# Patient Record
Sex: MALE | Race: WHITE | NOT HISPANIC OR LATINO | Employment: OTHER | URBAN - NONMETROPOLITAN AREA
[De-identification: names, ages, dates, MRNs, and addresses within clinical notes are randomized per-mention and may not be internally consistent; named-entity substitution may affect disease eponyms.]

---

## 2018-07-24 ENCOUNTER — HOSPITAL ENCOUNTER (EMERGENCY)
Facility: HOSPITAL | Age: 58
Discharge: HOME | End: 2018-07-24
Attending: PHYSICIAN ASSISTANT | Admitting: PHYSICIAN ASSISTANT
Payer: COMMERCIAL

## 2018-07-24 VITALS
DIASTOLIC BLOOD PRESSURE: 80 MMHG | OXYGEN SATURATION: 96 % | RESPIRATION RATE: 20 BRPM | SYSTOLIC BLOOD PRESSURE: 125 MMHG | HEIGHT: 70 IN | HEART RATE: 87 BPM | BODY MASS INDEX: 33.64 KG/M2 | TEMPERATURE: 98.6 F | WEIGHT: 235 LBS

## 2018-07-24 DIAGNOSIS — L01.00 IMPETIGO: ICD-10-CM

## 2018-07-24 DIAGNOSIS — L03.211 FACIAL CELLULITIS: ICD-10-CM

## 2018-07-24 DIAGNOSIS — B02.9 HERPES ZOSTER WITHOUT COMPLICATION: ICD-10-CM

## 2018-07-24 PROCEDURE — 99213 OFFICE O/P EST LOW 20 MIN: CPT | Performed by: PHYSICIAN ASSISTANT

## 2018-07-24 PROCEDURE — G0463 HOSPITAL OUTPT CLINIC VISIT: HCPCS

## 2018-07-24 RX ORDER — VALACYCLOVIR HYDROCHLORIDE 1 G/1
1000 TABLET, FILM COATED ORAL 3 TIMES DAILY
Qty: 30 TABLET | Refills: 1 | Status: SHIPPED | OUTPATIENT
Start: 2018-07-24 | End: 2019-11-07

## 2018-07-24 RX ORDER — SULFAMETHOXAZOLE/TRIMETHOPRIM 800-160 MG
1 TABLET ORAL 2 TIMES DAILY
Qty: 20 TABLET | Refills: 0 | Status: SHIPPED | OUTPATIENT
Start: 2018-07-24 | End: 2018-08-03

## 2018-07-24 RX ORDER — MUPIROCIN 20 MG/G
OINTMENT TOPICAL 2 TIMES DAILY
Qty: 15 G | Refills: 0 | Status: SHIPPED | OUTPATIENT
Start: 2018-07-24 | End: 2019-11-21

## 2018-07-24 ASSESSMENT — ENCOUNTER SYMPTOMS
FACIAL SWELLING: 1
CONSTITUTIONAL NEGATIVE: 1
NEUROLOGICAL NEGATIVE: 1
CARDIOVASCULAR NEGATIVE: 1
PSYCHIATRIC NEGATIVE: 1

## 2018-07-24 NOTE — ED AVS SNAPSHOT
[facility]    [address]    [place] [address]    Phone:  [phone]                                       Junior Castellanos   MRN: [id number]    Department:  [facility]   Date of Visit:  7/24/2018           Patient Information     Date Of Birth          1960        Your diagnoses for this visit were:     Herpes zoster without complication     Facial cellulitis     Impetigo        You were seen by Alona Meredith PA.      Follow-up Information     Follow up with Ludmila Hahn MD.    Specialty:  Family Practice    Why:  If symptoms worsen    Contact information:    [address]  [place] [address]  [phone]          Follow up with [facility].    Specialty:  EMERGENCY MEDICINE    Why:  If further concerns develop    Contact information:    [address] [place] [address] [phone]    Additional information:    From [place] Area: Take [address]. Turn left at [address]. Turn left at the first stoplight on [address] [place] [address]. At the first stop sign, take a right onto [address]. Take a left into the parking lot and continue through until you reach the North enterance of the building.       From [place]: Take [address]. Take the [address] ramp towards [place]. Turn left onto [address]. Take a slight right onto [address]. Turn left at the first stoplight on [address]. At the first stop sign, take a right onto [address]. Take a left into the parking lot and continue through until you reach the North enterance of the building.       From [place]: Take [address]. Take a right at [address]. At the first stop sign, take a right onto [address]. Take a left into the parking lot and continue through until you reach the North enterance of the building.       Discharge References/Attachments     SHINGLES (HERPES ZOSTER) (ENGLISH)    SKIN INFECTION,  CELLULITIS (ENGLISH)    IMPETIGO, UNDERSTANDING (ENGLISH)         Review of your medicines      START taking        Dose / Directions Last dose taken    mupirocin 2 % ointment   Commonly known as:  BACTROBAN   Quantity:  15 g        Apply topically 2 times daily   Refills:  0        sulfamethoxazole-trimethoprim 800-160 MG per tablet   Commonly known as:  BACTRIM DS/SEPTRA DS   Dose:  1 tablet   Quantity:  20 tablet        Take 1 tablet by mouth 2 times daily for 10 days   Refills:  0        valACYclovir 1000 mg tablet   Commonly known as:  VALTREX   Dose:  1000 mg   Quantity:  30 tablet        Take 1 tablet (1,000 mg) by mouth 3 times daily for 10 days   Refills:  1                Prescriptions were sent or printed at these locations (3 Prescriptions)                   [facility] - [address]   [address] [address]    Telephone:  [phone]   Fax:  [phone]   Hours:                  E-Prescribed (3 of 3)         mupirocin (BACTROBAN) 2 % ointment               sulfamethoxazole-trimethoprim (BACTRIM DS/SEPTRA DS) 800-160 MG per tablet               valACYclovir (VALTREX) 1000 mg tablet                Orders Needing Specimen Collection     None      Pending Results     No orders found from 7/22/2018 to 7/25/2018.            Pending Culture Results     No orders found from 7/22/2018 to 7/25/2018.            Thank you for choosing [facility]       Thank you for choosing [facility] for your care. Our goal is always to provide you with excellent care. Hearing back from our patients is one way we can continue to improve our services. Please take a few minutes to complete the written survey that you may receive in the mail after you visit with us. Thank you!        [employer] Information     "[employer] gives you secure access to your electronic health record. If you see a primary care provider, you can also send messages to your care team and make appointments. If you have  questions, please call your primary care clinic.  If you do not have a primary care provider, please call [phone] and they will assist you.        Care EveryWhere ID     This is your Care EveryWhere ID. This could be used by other organizations to access your [place] medical records  [id number]        Equal Access to Services     MALACHI GUERAR : Johnson Carlos, wascar garcia, mega diasalefrem braun, elton mccallum. So [facility] [phone].    ATENCIÓN: Si habla español, tiene a bustamante disposición servicios gratuitos de asistencia lingüística. Llame al [phone].    We comply with applicable federal civil rights laws and [place] laws. We do not discriminate on the basis of race, color, national origin, age, disability, sex, sexual orientation, or gender identity.            After Visit Summary       This is your record. Keep this with you and show to your community pharmacist(s) and doctor(s) at your next visit.

## 2018-07-24 NOTE — ED NOTES
"Pt in for complaints of left eyebrow pain and swelling. Effected location is in an area where he reports he \"popped a few pimples on\" on Friday. Reports swelling since that time. Mild pain complaints to area. Thinks he may have aggravated the swelling as it is in the area he wears his C-PAP at night.  "

## 2018-07-24 NOTE — ED AVS SNAPSHOT
[facility]    [address] [address]    Phone:  [phone]                                       Junior Castellanos   MRN: [id number]    Department:  [facility]   Date of Visit:  7/24/2018           After Visit Summary Signature Page     I have received my discharge instructions, and my questions have been answered. I have discussed any challenges I see with this plan with the nurse or doctor.    ..........................................................................................................................................  Patient/Patient Representative Signature      ..........................................................................................................................................  Patient Representative Print Name and Relationship to Patient    ..................................................               ................................................  Date                                            Time    ..........................................................................................................................................  Reviewed by Signature/Title    ...................................................              ..............................................  Date                                                            Time

## 2018-07-25 NOTE — ED PROVIDER NOTES
"  History     Chief Complaint   Patient presents with     Wound Infection     laura left eyebrow. ongoing since Friday evening.     The history is provided by the patient. No  was used.     Junior Castellanos is a 57 year old male who for 3 days has been developing a rash/wound over left eyebrow. No n/v/d/f/c. He does not recall any direct injury or insect bite.    Problem List:    Patient Active Problem List    Diagnosis Date Noted     JAGUAR on CPAP      Priority: Medium        Past Medical History:    Past Medical History:   Diagnosis Date     JAGUAR on CPAP        Past Surgical History:    Past Surgical History:   Procedure Laterality Date     COLONOSCOPY N/A 5/20/2016    Procedure: COLONOSCOPY;  Surgeon: Mayo Felix DO;  Location: [facility]     UPPER GI ENDOSCOPY         Family History:    No family history on file.    Social History:  Marital Status:   [2]  Social History   Substance Use Topics     Smoking status: Former Smoker     Types: Cigarettes     Smokeless tobacco: Never Used     Alcohol use No        Medications:      mupirocin (BACTROBAN) 2 % ointment   sulfamethoxazole-trimethoprim (BACTRIM DS/SEPTRA DS) 800-160 MG per tablet   valACYclovir (VALTREX) 1000 mg tablet         Review of Systems   Constitutional: Negative.    HENT: Positive for facial swelling.    Cardiovascular: Negative.    Skin: Positive for rash.   Neurological: Negative.    Psychiatric/Behavioral: Negative.        Physical Exam   BP: 125/80  Pulse: 87  Temp: 98.6  F (37  C)  Resp: 20  Height: 177.8 cm (5' 10\")  Weight: 106.6 kg (235 lb)  SpO2: 96 %      Physical Exam   Constitutional: He is oriented to person, place, and time. He appears well-developed and well-nourished. No distress.   HENT:   Left side of pt's scalp and down to his left eyebrow pt has moderate amount erythematous  Based vesicles.    There is also an area of of honeycombed crusting over left lateral eyebrow area.    Left upper and lower eyelids " and left eyebrow have erythema/edema.     Left eye examined under magnification, with fluorescein stain and wood's lamp.  No ulcer/FB/ferning/abrasion noted    Cardiovascular: Normal rate.    Pulmonary/Chest: Effort normal.   Neurological: He is alert and oriented to person, place, and time.   Skin: He is not diaphoretic.   Psychiatric: He has a normal mood and affect.   Nursing note and vitals reviewed.      ED Course     ED Course     Procedures            Assessments & Plan (with Medical Decision Making)     I have reviewed the nursing notes.    I have reviewed the findings, diagnosis, plan and need for follow up with the patient.      Discharge Medication List as of 7/24/2018  9:56 AM      START taking these medications    Details   mupirocin (BACTROBAN) 2 % ointment Apply topically 2 times dailyDisp-15 g, [id number]      sulfamethoxazole-trimethoprim (BACTRIM DS/SEPTRA DS) 800-160 MG per tablet Take 1 tablet by mouth 2 times daily for 10 days, Disp-20 tablet, R-0, E-Prescribe      valACYclovir (VALTREX) 1000 mg tablet Take 1 tablet (1,000 mg) by mouth 3 times daily for 10 days, Disp-30 tablet, R-1, E-Prescribe             Final diagnoses:   Herpes zoster without complication   Facial cellulitis   Impetigo         Keep area clean/dry  Patient verbally educated and given appropriate education sheets for the diagnoses and has no questions.  Take medications as directed.   Follow up with your Primary Care provider if symptoms increase or if further concerns develop, return to the ER  Alona Meredith Certified  Physician Assistant  7/24/2018  8:41 PM  URGENT CARE CLINIC      7/24/2018   [facility]     Alona Meredith PA  07/24/18 2042

## 2019-11-07 ENCOUNTER — APPOINTMENT (OUTPATIENT)
Dept: GENERAL RADIOLOGY | Facility: HOSPITAL | Age: 59
End: 2019-11-07
Attending: FAMILY MEDICINE
Payer: COMMERCIAL

## 2019-11-07 ENCOUNTER — HOSPITAL ENCOUNTER (EMERGENCY)
Facility: HOSPITAL | Age: 59
Discharge: HOME | End: 2019-11-07
Attending: FAMILY MEDICINE | Admitting: FAMILY MEDICINE
Payer: COMMERCIAL

## 2019-11-07 VITALS
RESPIRATION RATE: 16 BRPM | HEART RATE: 102 BPM | DIASTOLIC BLOOD PRESSURE: 81 MMHG | WEIGHT: 195 LBS | OXYGEN SATURATION: 99 % | HEIGHT: 69 IN | SYSTOLIC BLOOD PRESSURE: 118 MMHG | TEMPERATURE: 98.5 F | BODY MASS INDEX: 28.88 KG/M2

## 2019-11-07 DIAGNOSIS — R10.13 EPIGASTRIC PAIN: ICD-10-CM

## 2019-11-07 DIAGNOSIS — E11.69 TYPE 2 DIABETES MELLITUS WITH OTHER SPECIFIED COMPLICATION, WITHOUT LONG-TERM CURRENT USE OF INSULIN (H): ICD-10-CM

## 2019-11-07 DIAGNOSIS — R07.89 NON-CARDIAC CHEST PAIN: ICD-10-CM

## 2019-11-07 LAB
ANION GAP SERPL CALCULATED.3IONS-SCNC: 9 MMOL/L (ref 3–14)
BASOPHILS # BLD AUTO: 0 10E9/L (ref 0–0.2)
BASOPHILS NFR BLD AUTO: 0.5 %
BUN SERPL-MCNC: 12 MG/DL (ref 7–30)
CALCIUM SERPL-MCNC: 8.8 MG/DL (ref 8.5–10.1)
CHLORIDE SERPL-SCNC: 104 MMOL/L (ref 94–109)
CO2 SERPL-SCNC: 22 MMOL/L (ref 20–32)
CREAT SERPL-MCNC: 0.7 MG/DL (ref 0.66–1.25)
DIFFERENTIAL METHOD BLD: NORMAL
EOSINOPHIL # BLD AUTO: 0.1 10E9/L (ref 0–0.7)
EOSINOPHIL NFR BLD AUTO: 0.7 %
ERYTHROCYTE [DISTWIDTH] IN BLOOD BY AUTOMATED COUNT: 12.8 % (ref 10–15)
EST. AVERAGE GLUCOSE BLD GHB EST-MCNC: 335 MG/DL
GFR SERPL CREATININE-BSD FRML MDRD: >90 ML/MIN/{1.73_M2}
GLUCOSE BLDC GLUCOMTR-MCNC: 271 MG/DL (ref 70–99)
GLUCOSE BLDC GLUCOMTR-MCNC: 305 MG/DL (ref 70–99)
GLUCOSE SERPL-MCNC: 366 MG/DL (ref 70–99)
HBA1C MFR BLD: 13.3 % (ref 0–5.6)
HCT VFR BLD AUTO: 45 % (ref 40–53)
HGB BLD-MCNC: 15.3 G/DL (ref 13.3–17.7)
IMM GRANULOCYTES # BLD: 0 10E9/L (ref 0–0.4)
IMM GRANULOCYTES NFR BLD: 0.2 %
LYMPHOCYTES # BLD AUTO: 1.9 10E9/L (ref 0.8–5.3)
LYMPHOCYTES NFR BLD AUTO: 23.2 %
MCH RBC QN AUTO: 28.8 PG (ref 26.5–33)
MCHC RBC AUTO-ENTMCNC: 34 G/DL (ref 31.5–36.5)
MCV RBC AUTO: 85 FL (ref 78–100)
MONOCYTES # BLD AUTO: 0.7 10E9/L (ref 0–1.3)
MONOCYTES NFR BLD AUTO: 8.1 %
NEUTROPHILS # BLD AUTO: 5.5 10E9/L (ref 1.6–8.3)
NEUTROPHILS NFR BLD AUTO: 67.3 %
NRBC # BLD AUTO: 0 10*3/UL
NRBC BLD AUTO-RTO: 0 /100
PLATELET # BLD AUTO: 288 10E9/L (ref 150–450)
POTASSIUM SERPL-SCNC: 4 MMOL/L (ref 3.4–5.3)
RBC # BLD AUTO: 5.31 10E12/L (ref 4.4–5.9)
SODIUM SERPL-SCNC: 135 MMOL/L (ref 133–144)
TROPONIN I SERPL-MCNC: <0.015 UG/L (ref 0–0.04)
TROPONIN I SERPL-MCNC: <0.015 UG/L (ref 0–0.04)
WBC # BLD AUTO: 8.2 10E9/L (ref 4–11)

## 2019-11-07 PROCEDURE — 99285 EMERGENCY DEPT VISIT HI MDM: CPT | Mod: 25

## 2019-11-07 PROCEDURE — 93005 ELECTROCARDIOGRAM TRACING: CPT

## 2019-11-07 PROCEDURE — 71046 X-RAY EXAM CHEST 2 VIEWS: CPT | Mod: TC

## 2019-11-07 PROCEDURE — 80048 BASIC METABOLIC PNL TOTAL CA: CPT | Performed by: FAMILY MEDICINE

## 2019-11-07 PROCEDURE — 99285 EMERGENCY DEPT VISIT HI MDM: CPT | Mod: Z6 | Performed by: FAMILY MEDICINE

## 2019-11-07 PROCEDURE — 83036 HEMOGLOBIN GLYCOSYLATED A1C: CPT | Performed by: FAMILY MEDICINE

## 2019-11-07 PROCEDURE — 25000131 ZZH RX MED GY IP 250 OP 636 PS 637: Performed by: FAMILY MEDICINE

## 2019-11-07 PROCEDURE — C9113 INJ PANTOPRAZOLE SODIUM, VIA: HCPCS | Performed by: FAMILY MEDICINE

## 2019-11-07 PROCEDURE — 25000128 H RX IP 250 OP 636: Performed by: FAMILY MEDICINE

## 2019-11-07 PROCEDURE — 84484 ASSAY OF TROPONIN QUANT: CPT | Mod: 91 | Performed by: FAMILY MEDICINE

## 2019-11-07 PROCEDURE — 00000146 ZZHCL STATISTIC GLUCOSE BY METER IP

## 2019-11-07 PROCEDURE — 25000125 ZZHC RX 250: Performed by: FAMILY MEDICINE

## 2019-11-07 PROCEDURE — 84484 ASSAY OF TROPONIN QUANT: CPT | Performed by: FAMILY MEDICINE

## 2019-11-07 PROCEDURE — 40000788 ZZHCL STATISTIC ESTIMATED AVERAGE GLUCOSE: Performed by: FAMILY MEDICINE

## 2019-11-07 PROCEDURE — 85025 COMPLETE CBC W/AUTO DIFF WBC: CPT | Performed by: FAMILY MEDICINE

## 2019-11-07 PROCEDURE — 96374 THER/PROPH/DIAG INJ IV PUSH: CPT

## 2019-11-07 PROCEDURE — 93010 ELECTROCARDIOGRAM REPORT: CPT | Performed by: INTERNAL MEDICINE

## 2019-11-07 PROCEDURE — 36415 COLL VENOUS BLD VENIPUNCTURE: CPT | Performed by: FAMILY MEDICINE

## 2019-11-07 PROCEDURE — 25000132 ZZH RX MED GY IP 250 OP 250 PS 637: Performed by: FAMILY MEDICINE

## 2019-11-07 RX ORDER — DEXTROSE MONOHYDRATE 25 G/50ML
25-50 INJECTION, SOLUTION INTRAVENOUS
Status: DISCONTINUED | OUTPATIENT
Start: 2019-11-07 | End: 2019-11-08 | Stop reason: HOSPADM

## 2019-11-07 RX ORDER — PHENOBARBITAL, HYOSCYAMINE SULFATE, ATROPINE SULFATE, SCOPOLAMINE HYDROBROMIDE .0194; .1037; 16.2; .0065 MG/5ML; MG/5ML; MG/5ML; MG/5ML
2 ELIXIR ORAL EVERY 6 HOURS
Status: DISCONTINUED | OUTPATIENT
Start: 2019-11-07 | End: 2019-11-08 | Stop reason: HOSPADM

## 2019-11-07 RX ORDER — NICOTINE POLACRILEX 4 MG
15-30 LOZENGE BUCCAL
Status: DISCONTINUED | OUTPATIENT
Start: 2019-11-07 | End: 2019-11-08 | Stop reason: HOSPADM

## 2019-11-07 RX ADMIN — PANTOPRAZOLE SODIUM 40 MG: 40 INJECTION, POWDER, LYOPHILIZED, FOR SOLUTION INTRAVENOUS at 20:44

## 2019-11-07 RX ADMIN — LIDOCAINE HYDROCHLORIDE 30 ML: 20 SOLUTION ORAL; TOPICAL at 18:27

## 2019-11-07 RX ADMIN — INSULIN HUMAN 10 UNITS: 100 INJECTION, SOLUTION PARENTERAL at 21:39

## 2019-11-07 RX ADMIN — PHENOBARBITAL, HYOSCYAMINE SULFATE, ATROPINE SULFATE, SCOPOLAMINE HYDROBROMIDE: 16.2; .1037; .0194; .0065 ELIXIR ORAL at 18:27

## 2019-11-07 ASSESSMENT — ENCOUNTER SYMPTOMS
SHORTNESS OF BREATH: 0
HEADACHES: 1
ACTIVITY CHANGE: 1
NAUSEA: 0
POLYPHAGIA: 0
CONSTIPATION: 0
PALPITATIONS: 0
MUSCULOSKELETAL NEGATIVE: 1
WEAKNESS: 0
ABDOMINAL PAIN: 1
PSYCHIATRIC NEGATIVE: 1
DIARRHEA: 0
ROS GI COMMENTS: EPIGASTRIC
FATIGUE: 0
VOMITING: 0
DYSURIA: 0
POLYDIPSIA: 1

## 2019-11-07 ASSESSMENT — MIFFLIN-ST. JEOR: SCORE: 1684.89

## 2019-11-07 NOTE — ED TRIAGE NOTES
"Pt in for complaints of chest pressure intermittent for \"awhile\" but more constant today prompting visit. Pt reports some Upper abdominal pain as well recently with some nausea. Pt reports a HA as well. Sx onset at 2pm today. Pt does not normally get HA's per report.   " sob, acute pulmonary edema

## 2019-11-07 NOTE — ED AVS SNAPSHOT
[facility]  [address] [address]  Phone:  [phone]                                    Junior Castellanos   MRN: [id number]    Department:  [facility]   Date of Visit:  11/7/2019           After Visit Summary Signature Page    I have received my discharge instructions, and my questions have been answered. I have discussed any challenges I see with this plan with the nurse or doctor.    ..........................................................................................................................................  Patient/Patient Representative Signature      ..........................................................................................................................................  Patient Representative Print Name and Relationship to Patient    ..................................................               ................................................  Date                                   Time    ..........................................................................................................................................  Reviewed by Signature/Title    ...................................................              ..............................................  Date                                               Time          22EPIC Rev 08/18

## 2019-11-08 NOTE — ED PROVIDER NOTES
History     Chief Complaint   Patient presents with     Chest Pain     Headache     HPI  Junior Castellanos is a 58 year old male who presents to the emergency room with epigastric pain that radiates up into his chest.  He also has a headache.  Been having this pain for several days, however the chest pressure component was worse today and he is worried about cardiac.  The upper abdominal pain and nausea have been going on for some time, he is not currently nauseated.  He rates his chest pain at 2/10.  He also had a headache that started at 2 PM today, he does not normally get headaches.    Allergies:  No Known Allergies    Problem List:    Patient Active Problem List    Diagnosis Date Noted     JAGUAR on CPAP      Priority: Medium        Past Medical History:    Past Medical History:   Diagnosis Date     JAGUAR on CPAP        Past Surgical History:    Past Surgical History:   Procedure Laterality Date     COLONOSCOPY N/A 5/20/2016    Procedure: COLONOSCOPY;  Surgeon: Mayo Felix DO;  Location: [facility]     UPPER GI ENDOSCOPY         Family History:    No family history on file.    Social History:  Marital Status:   [2]  Social History     Tobacco Use     Smoking status: Former Smoker     Types: Cigarettes     Smokeless tobacco: Never Used   Substance Use Topics     Alcohol use: No     Alcohol/week: 0.0 standard drinks     Drug use: No        Medications:    metFORMIN (GLUCOPHAGE) 500 MG tablet  omeprazole (PRILOSEC) 20 MG DR capsule  mupirocin (BACTROBAN) 2 % ointment          Review of Systems   Constitutional: Positive for activity change. Negative for fatigue.   HENT: Negative.    Respiratory: Negative for shortness of breath.    Cardiovascular: Positive for chest pain. Negative for palpitations and leg swelling.   Gastrointestinal: Positive for abdominal pain. Negative for constipation, diarrhea, nausea and vomiting.        Epigastric   Endocrine: Positive for polydipsia and polyuria. Negative for  "polyphagia.   Genitourinary: Negative for dysuria.   Musculoskeletal: Negative.    Skin: Negative.    Neurological: Positive for headaches. Negative for weakness.   Psychiatric/Behavioral: Negative.        Physical Exam   BP: 126/80  Pulse: 110  Heart Rate: 100  Temp: 98.3  F (36.8  C)  Resp: 20  Height: 175.3 cm (5' 9\")  Weight: 88.5 kg (195 lb)  SpO2: 98 %      Physical Exam  Vitals signs and nursing note reviewed.   Constitutional:       General: He is not in acute distress.     Appearance: He is well-developed and normal weight.   HENT:      Head: Normocephalic and atraumatic.   Eyes:      Extraocular Movements: Extraocular movements intact.      Pupils: Pupils are equal, round, and reactive to light.   Neck:      Musculoskeletal: Normal range of motion and neck supple.   Cardiovascular:      Rate and Rhythm: Normal rate and regular rhythm.      Heart sounds: Normal heart sounds. No murmur.   Pulmonary:      Effort: Pulmonary effort is normal. No tachypnea.      Breath sounds: Normal breath sounds.   Abdominal:      General: Bowel sounds are normal.      Palpations: Abdomen is soft.      Tenderness: There is tenderness in the epigastric area.          Comments: Radiation of pain up in the esophagus at midline   Musculoskeletal: Normal range of motion.   Skin:     General: Skin is warm and dry.      Capillary Refill: Capillary refill takes less than 2 seconds.   Neurological:      General: No focal deficit present.      Mental Status: He is alert and oriented to person, place, and time.      Motor: No weakness.   Psychiatric:         Mood and Affect: Mood normal.         Behavior: Behavior normal.         ED Course        Procedures          EKG Interpretation:      Interpreted by Nati Meek MD  Time reviewed: 1720  Symptoms at time of EKG: chest pain / burning   Rhythm: normal sinus   Rate: normal  Axis: normal  Ectopy: none  Conduction: normal  ST Segments/ T Waves: No ST-T wave changes  Q " Waves: none  Comparison to prior: No old EKG available    Clinical Impression: normal EKG    Results for orders placed or performed during the hospital encounter of 11/07/19 (from the past 24 hour(s))   CBC with platelets differential   Result Value Ref Range    WBC 8.2 4.0 - 11.0 10e9/L    RBC Count 5.31 4.4 - 5.9 10e12/L    Hemoglobin 15.3 13.3 - 17.7 g/dL    Hematocrit 45.0 40.0 - 53.0 %    MCV 85 78 - 100 fl    MCH 28.8 26.5 - 33.0 pg    MCHC 34.0 31.5 - 36.5 g/dL    RDW 12.8 10.0 - 15.0 %    Platelet Count 288 150 - 450 10e9/L    Diff Method Automated Method     % Neutrophils 67.3 %    % Lymphocytes 23.2 %    % Monocytes 8.1 %    % Eosinophils 0.7 %    % Basophils 0.5 %    % Immature Granulocytes 0.2 %    Nucleated RBCs 0 0 /100    Absolute Neutrophil 5.5 1.6 - 8.3 10e9/L    Absolute Lymphocytes 1.9 0.8 - 5.3 10e9/L    Absolute Monocytes 0.7 0.0 - 1.3 10e9/L    Absolute Eosinophils 0.1 0.0 - 0.7 10e9/L    Absolute Basophils 0.0 0.0 - 0.2 10e9/L    Abs Immature Granulocytes 0.0 0 - 0.4 10e9/L    Absolute Nucleated RBC 0.0    Basic metabolic panel   Result Value Ref Range    Sodium 135 133 - 144 mmol/L    Potassium 4.0 3.4 - 5.3 mmol/L    Chloride 104 94 - 109 mmol/L    Carbon Dioxide 22 20 - 32 mmol/L    Anion Gap 9 3 - 14 mmol/L    Glucose 366 (H) 70 - 99 mg/dL    Urea Nitrogen 12 7 - 30 mg/dL    Creatinine 0.70 0.66 - 1.25 mg/dL    GFR Estimate >90 >60 mL/min/[1.73_m2]    GFR Estimate If Black >90 >60 mL/min/[1.73_m2]    Calcium 8.8 8.5 - 10.1 mg/dL   Troponin I   Result Value Ref Range    Troponin I ES <0.015 0.000 - 0.045 ug/L   Hemoglobin A1c   Result Value Ref Range    Hemoglobin A1C 13.3 (H) 0 - 5.6 %   Estimated Average Glucose   Result Value Ref Range    Estimated Average Glucose 335 mg/dL   XR Chest 2 Views    Narrative    XR CHEST 2 VW    HISTORY: 58 years Male chest pain    COMPARISON: None    TECHNIQUE: 2 views of the chest were obtained.    FINDINGS: Two views of the chest were obtained. Heart size  and  pulmonary vascularity are within normal limits, lungs are clear on  both views. No consolidating air space opacities are present.          Impression    IMPRESSION: Clear chest.    RADHA HENRIQUEZ MD   Troponin I   Result Value Ref Range    Troponin I ES <0.015 0.000 - 0.045 ug/L       Medications   atropine-PHENobarbital-scopolamine-hyoscyamine (DONNATAL) 16.2 MG/5ML solution 6.48 mg ( Oral Given 11/7/19 1827)   glucose gel 15-30 g (has no administration in time range)     Or   dextrose 50 % injection 25-50 mL (has no administration in time range)     Or   glucagon injection 1 mg (has no administration in time range)   insulin regular (HumuLIN R/NovoLIN R VIAL) injection 10 Units (has no administration in time range)   lidocaine (XYLOCAINE) 2 % 15 mL, alum & mag hydroxide-simethicone (MYLANTA ES/MAALOX  ES) 15 mL GI Cocktail (30 mLs Oral Given 11/7/19 1827)   pantoprazole (PROTONIX) 40 mg IV push injection (40 mg Intravenous Given 11/7/19 2044)       Assessments & Plan (with Medical Decision Making)   Patient's chest pain appears to be gastric rather than cardiac.  He has burning type pain, it did improve some with GI cocktail.  We will start him on omeprazole 40 mg twice daily for 14 days, then decrease to daily.  Cardiac enzymes negative x2, 3 hours apart.  Patient is not a known diabetic, however he is clearly been diabetic for some time.  His hemoglobin A1c is 13.3 and his blood glucose while here was 336.  He received 10 units of insulin subcutaneously in the ER, will be started on metformin 500 mg twice daily and referred to diabetes education and primary care for management of his diabetes.  Note sent to Dr. Garg as Dr. Ureña does not have a mailbox as yet.    I have reviewed the nursing notes.    I have reviewed the findings, diagnosis, plan and need for follow up with the patient.  New Prescriptions    METFORMIN (GLUCOPHAGE) 500 MG TABLET    Take 1 tablet (500 mg) by mouth 2 times daily (with  meals)    OMEPRAZOLE (PRILOSEC) 20 MG DR CAPSULE    Take 2 capsules (40 mg) by mouth 2 times daily Take twice daily for 2 weeks, then daily       Final diagnoses:   Epigastric pain   Non-cardiac chest pain   Type 2 diabetes mellitus with other specified complication, without long-term current use of insulin (H)       11/7/2019   [facility]     Nati Valero MD  11/07/19 3712

## 2019-11-11 ENCOUNTER — TRANSFERRED RECORDS (OUTPATIENT)
Dept: HEALTH INFORMATION MANAGEMENT | Facility: CLINIC | Age: 59
End: 2019-11-11

## 2019-11-11 LAB — RETINOPATHY: NORMAL

## 2019-11-18 ENCOUNTER — TRANSFERRED RECORDS (OUTPATIENT)
Dept: HEALTH INFORMATION MANAGEMENT | Facility: HOSPITAL | Age: 59
End: 2019-11-18

## 2019-11-18 LAB
CHOLEST SERPL-MCNC: 144 MG/DL (ref 114–200)
HDLC SERPL-MCNC: 29 MG/DL (ref 40–60)
LDLC SERPL CALC-MCNC: 70 MG/DL
TRIGL SERPL-MCNC: 224 MG/DL (ref 10–200)

## 2019-11-20 DIAGNOSIS — E11.9 NEW ONSET TYPE 2 DIABETES MELLITUS (H): ICD-10-CM

## 2019-11-20 RX ORDER — ASPIRIN 81 MG/1
81 TABLET ORAL DAILY
COMMUNITY

## 2019-11-20 RX ORDER — ATORVASTATIN CALCIUM 10 MG/1
10 TABLET, FILM COATED ORAL EVERY EVENING
Refills: 10 | COMMUNITY
Start: 2019-11-18 | End: 2022-11-16

## 2019-11-21 ENCOUNTER — HOSPITAL ENCOUNTER (OUTPATIENT)
Dept: EDUCATION SERVICES | Facility: HOSPITAL | Age: 59
Discharge: HOME | End: 2019-11-21
Attending: FAMILY MEDICINE | Admitting: FAMILY MEDICINE
Payer: COMMERCIAL

## 2019-11-21 VITALS
HEART RATE: 86 BPM | SYSTOLIC BLOOD PRESSURE: 96 MMHG | BODY MASS INDEX: 29.55 KG/M2 | HEIGHT: 68 IN | DIASTOLIC BLOOD PRESSURE: 57 MMHG | OXYGEN SATURATION: 98 % | WEIGHT: 195 LBS

## 2019-11-21 DIAGNOSIS — E11.9 NEW ONSET TYPE 2 DIABETES MELLITUS (H): ICD-10-CM

## 2019-11-21 PROCEDURE — G0108 DIAB MANAGE TRN  PER INDIV: HCPCS

## 2019-11-21 ASSESSMENT — ANXIETY QUESTIONNAIRES
GAD7 TOTAL SCORE: 0
6. BECOMING EASILY ANNOYED OR IRRITABLE: NOT AT ALL
1. FEELING NERVOUS, ANXIOUS, OR ON EDGE: NOT AT ALL
3. WORRYING TOO MUCH ABOUT DIFFERENT THINGS: NOT AT ALL
7. FEELING AFRAID AS IF SOMETHING AWFUL MIGHT HAPPEN: NOT AT ALL
5. BEING SO RESTLESS THAT IT IS HARD TO SIT STILL: NOT AT ALL
IF YOU CHECKED OFF ANY PROBLEMS ON THIS QUESTIONNAIRE, HOW DIFFICULT HAVE THESE PROBLEMS MADE IT FOR YOU TO DO YOUR WORK, TAKE CARE OF THINGS AT HOME, OR GET ALONG WITH OTHER PEOPLE: NOT DIFFICULT AT ALL
2. NOT BEING ABLE TO STOP OR CONTROL WORRYING: NOT AT ALL

## 2019-11-21 ASSESSMENT — PATIENT HEALTH QUESTIONNAIRE - PHQ9
5. POOR APPETITE OR OVEREATING: NOT AT ALL
SUM OF ALL RESPONSES TO PHQ QUESTIONS 1-9: 1

## 2019-11-21 ASSESSMENT — PAIN SCALES - GENERAL: PAINLEVEL: NO PAIN (0)

## 2019-11-21 ASSESSMENT — MIFFLIN-ST. JEOR: SCORE: 1665.04

## 2019-11-21 NOTE — LETTER
"    11/21/2019        RE: Junior Castellanos  [address] [place] [address]        Diabetes Self-Management Education & Support    Diabetes Education Self Management & Training    SUBJECTIVE/OBJECTIVE:  Presents for: Initial Assessment for new diagnosis  Accompanied by: Self, Spouse  Diabetes education in the past 24mo: No  Focus of Visit: Healthy Eating  Diabetes type: Type 2  Disease course: Improving  Diabetes management related comments/concerns: what do I need to do?  Transportation concerns: No  Other concerns:: Glasses  Cultural Influences/Ethnic Background:  American      Diabetes Symptoms & Complications  Blurred vision: Yes  Fatigue: Yes  Neuropathy: Yes  Foot ulcerations: No  Polydipsia: No  Polyphagia: No  Polyuria: Yes  Visual change: No  Weakness: No  Weight loss: No  Slow healing wounds: No  Recent Infection(s): No  Symptom course: Improving  Weight trend: Decreasing steadily       Patient Problem List and Family Medical History reviewed for relevant medical history, current medical status, and diabetes risk factors.    Vitals:  BP 96/57   Pulse 86   Ht 1.721 m (5' 7.75\")   Wt 88.5 kg (195 lb)   SpO2 98%   BMI 29.87 kg/m     Estimated body mass index is 29.87 kg/m  as calculated from the following:    Height as of this encounter: 1.721 m (5' 7.75\").    Weight as of this encounter: 88.5 kg (195 lb).   Last 3 BP:   BP Readings from Last 3 Encounters:   11/21/19 96/57   11/07/19 118/81   07/24/18 125/80       History   Smoking Status     Former Smoker     Types: Cigarettes   Smokeless Tobacco     Never Used       Labs:  Lab Results   Component Value Date    A1C 13.3 11/07/2019     Lab Results   Component Value Date     11/07/2019     Lab Results   Component Value Date    LDL 70 11/18/2019     HDL Cholesterol   Date Value Ref Range Status   11/18/2019 29 (L) 40 - 60 mg/dL Final   ]  GFR Estimate   Date Value Ref Range Status   11/07/2019 >90 >60 mL/min/[1.73_m2] Final     Comment:     Non "  GFR Calc  Starting 12/18/2018, serum creatinine based estimated GFR (eGFR) will be   calculated using the Chronic Kidney Disease Epidemiology Collaboration   (CKD-EPI) equation.       GFR Estimate If Black   Date Value Ref Range Status   11/07/2019 >90 >60 mL/min/[1.73_m2] Final     Comment:      GFR Calc  Starting 12/18/2018, serum creatinine based estimated GFR (eGFR) will be   calculated using the Chronic Kidney Disease Epidemiology Collaboration   (CKD-EPI) equation.       Lab Results   Component Value Date    CR 0.70 11/07/2019     No results found for: MICROALBUMIN    Healthy Eating  Healthy Eating Assessed Today: Yes  Meal planning: Smaller portions, Heart healthy, Avoiding sweets  Meals include: Breakfast, Dinner, Snacks  Breakfast: plain oatmeal with cinnamon  Lunch: snacks: rice cakes or nuts  Dinner: beef or chicken with salad, pasta  Snacks: nuts, fruit  Beverages: Water, Tea, Coffee, Other(Propel or Sobies)  Has patient met with a dietitian in the past?: No    Being Active  Being Active Assessed Today: Yes  Exercise:: Currently not exercising  Barrier to exercise: Access    Monitoring  Monitoring Assessed Today: No(Has not started monitoring)          Taking Medications  Diabetes Medication(s)     Biguanides       metFORMIN (GLUCOPHAGE) 500 MG tablet    Take 1 tablet (500 mg) by mouth 2 times daily (with meals)          Taking Medication Assessed Today: Yes  Current Treatments: Oral Agent (monotherapy)  Problems taking diabetes medications regularly?: No  Diabetes medication side effects?: No  Treatment Compliance: All of the time    Problem Solving  Problem Solving Assessed Today: Yes  Hypoglycemia Frequency: Never              Reducing Risks  Reducing Risks Assessed Today: No  Has dilated eye exam at least once a year?: Yes    Healthy Coping  Healthy Coping Assessed Today: Yes  Emotional response to diabetes: Ready to learn  Informal Support system:: Family  Stage of  change: PREPARATION (Decided to change - considering how)  Difficulty affording diabetes management supplies?: No  Support resources: Websites  Patient Activation Measure Survey Score:  No flowsheet data found.    ASSESSMENT:  Junior has a new diagnosis of diabetes. Denies family history. He is accompanied by his wife who is very supportive. They both ask many pertinent questions and are engaged in education.        Patient's most recent   Lab Results   Component Value Date    A1C 13.3 11/07/2019    is not meeting goal of <8.0    INTERVENTION:   Diabetes knowledge and skills assessment:     Patient is knowledgeable in diabetes management concepts related to: Being Active and Taking Medication    Patient needs further education on the following diabetes management concepts: Healthy Eating, Being Active and Monitoring    Based on learning assessment above, most appropriate setting for further diabetes education would be: Individual setting.    Education provided today on:  AADE Self-Care Behaviors:  Diabetes Pathophysiology  Healthy Eating: carbohydrate counting, consistency in amount, composition, and timing of food intake, weight reduction, portion control and label reading  Being Active: relationship to blood glucose, describe appropriate activity program and precautions to take  Monitoring: purpose, proper technique, log and interpret results, individual blood glucose targets, frequency of monitoring, use of glucose control solution and proper sharps disposal. Able to do a BG test with minimal prompts. BG result 277, 5 hrs ppd  Taking Medication: action of prescribed medication, side effects of prescribed medications and when to take medications    Opportunities for ongoing education and support in diabetes-self management were discussed.    Pt verbalized understanding of concepts discussed and recommendations provided today.       Education Materials Provided:  Type 2 Basics Book, food logs, My Food Plan, Contour  Next Meter Kit    PLAN:  See Patient Instructions for co-developed, patient-stated behavior change goals.  Test BG every AM before food or coffee and 2 hours after evening meal.    Keep food logs    Return to Diabetes Ed on 12/5 19 at 3:30 pm    AVS printed and provided to patient today. See Follow-Up section for recommended follow-up.      Time Spent: 80 minutes  Encounter Type: Individual    Any diabetes medication dose changes were made via the CDE Protocol and Collaborative Practice Agreement with the patient's primary care provider. A copy of this encounter was shared with the provider.            Sincerely,        Linda Lacey RN

## 2019-11-22 ASSESSMENT — ANXIETY QUESTIONNAIRES: GAD7 TOTAL SCORE: 0

## 2019-11-25 ENCOUNTER — TELEPHONE (OUTPATIENT)
Dept: EDUCATION SERVICES | Facility: OTHER | Age: 59
End: 2019-11-25

## 2019-11-25 NOTE — PATIENT INSTRUCTIONS
Test BG every AM before food or coffee and 2 hours after evening meal.    Keep food logs    Return to Diabetes Ed on 12/5 19 at 3:30 pm

## 2019-11-25 NOTE — PROGRESS NOTES
"Diabetes Self-Management Education & Support    Diabetes Education Self Management & Training    SUBJECTIVE/OBJECTIVE:  Presents for: Initial Assessment for new diagnosis  Accompanied by: Self, Spouse  Diabetes education in the past 24mo: No  Focus of Visit: Healthy Eating  Diabetes type: Type 2  Disease course: Improving  Diabetes management related comments/concerns: what do I need to do?  Transportation concerns: No  Other concerns:: Glasses  Cultural Influences/Ethnic Background:  American      Diabetes Symptoms & Complications  Blurred vision: Yes  Fatigue: Yes  Neuropathy: Yes  Foot ulcerations: No  Polydipsia: No  Polyphagia: No  Polyuria: Yes  Visual change: No  Weakness: No  Weight loss: No  Slow healing wounds: No  Recent Infection(s): No  Symptom course: Improving  Weight trend: Decreasing steadily       Patient Problem List and Family Medical History reviewed for relevant medical history, current medical status, and diabetes risk factors.    Vitals:  BP 96/57   Pulse 86   Ht 1.721 m (5' 7.75\")   Wt 88.5 kg (195 lb)   SpO2 98%   BMI 29.87 kg/m    Estimated body mass index is 29.87 kg/m  as calculated from the following:    Height as of this encounter: 1.721 m (5' 7.75\").    Weight as of this encounter: 88.5 kg (195 lb).   Last 3 BP:   BP Readings from Last 3 Encounters:   11/21/19 96/57   11/07/19 118/81   07/24/18 125/80       History   Smoking Status     Former Smoker     Types: Cigarettes   Smokeless Tobacco     Never Used       Labs:  Lab Results   Component Value Date    A1C 13.3 11/07/2019     Lab Results   Component Value Date     11/07/2019     Lab Results   Component Value Date    LDL 70 11/18/2019     HDL Cholesterol   Date Value Ref Range Status   11/18/2019 29 (L) 40 - 60 mg/dL Final   ]  GFR Estimate   Date Value Ref Range Status   11/07/2019 >90 >60 mL/min/[1.73_m2] Final     Comment:     Non  GFR Calc  Starting 12/18/2018, serum creatinine based estimated GFR " (eGFR) will be   calculated using the Chronic Kidney Disease Epidemiology Collaboration   (CKD-EPI) equation.       GFR Estimate If Black   Date Value Ref Range Status   11/07/2019 >90 >60 mL/min/[1.73_m2] Final     Comment:      GFR Calc  Starting 12/18/2018, serum creatinine based estimated GFR (eGFR) will be   calculated using the Chronic Kidney Disease Epidemiology Collaboration   (CKD-EPI) equation.       Lab Results   Component Value Date    CR 0.70 11/07/2019     No results found for: MICROALBUMIN    Healthy Eating  Healthy Eating Assessed Today: Yes  Meal planning: Smaller portions, Heart healthy, Avoiding sweets  Meals include: Breakfast, Dinner, Snacks  Breakfast: plain oatmeal with cinnamon  Lunch: snacks: rice cakes or nuts  Dinner: beef or chicken with salad, pasta  Snacks: nuts, fruit  Beverages: Water, Tea, Coffee, Other(Propel or Sobies)  Has patient met with a dietitian in the past?: No    Being Active  Being Active Assessed Today: Yes  Exercise:: Currently not exercising  Barrier to exercise: Access    Monitoring  Monitoring Assessed Today: No(Has not started monitoring)          Taking Medications  Diabetes Medication(s)     Biguanides       metFORMIN (GLUCOPHAGE) 500 MG tablet    Take 1 tablet (500 mg) by mouth 2 times daily (with meals)          Taking Medication Assessed Today: Yes  Current Treatments: Oral Agent (monotherapy)  Problems taking diabetes medications regularly?: No  Diabetes medication side effects?: No  Treatment Compliance: All of the time    Problem Solving  Problem Solving Assessed Today: Yes  Hypoglycemia Frequency: Never              Reducing Risks  Reducing Risks Assessed Today: No  Has dilated eye exam at least once a year?: Yes    Healthy Coping  Healthy Coping Assessed Today: Yes  Emotional response to diabetes: Ready to learn  Informal Support system:: Family  Stage of change: PREPARATION (Decided to change - considering how)  Difficulty affording  diabetes management supplies?: No  Support resources: Websites  Patient Activation Measure Survey Score:  No flowsheet data found.    ASSESSMENT:  Junior has a new diagnosis of diabetes. Denies family history. He is accompanied by his wife who is very supportive. They both ask many pertinent questions and are engaged in education.        Patient's most recent   Lab Results   Component Value Date    A1C 13.3 11/07/2019    is not meeting goal of <8.0    INTERVENTION:   Diabetes knowledge and skills assessment:     Patient is knowledgeable in diabetes management concepts related to: Being Active and Taking Medication    Patient needs further education on the following diabetes management concepts: Healthy Eating, Being Active and Monitoring    Based on learning assessment above, most appropriate setting for further diabetes education would be: Individual setting.    Education provided today on:  AADE Self-Care Behaviors:  Diabetes Pathophysiology  Healthy Eating: carbohydrate counting, consistency in amount, composition, and timing of food intake, weight reduction, portion control and label reading  Being Active: relationship to blood glucose, describe appropriate activity program and precautions to take  Monitoring: purpose, proper technique, log and interpret results, individual blood glucose targets, frequency of monitoring, use of glucose control solution and proper sharps disposal. Able to do a BG test with minimal prompts. BG result 277, 5 hrs ppd  Taking Medication: action of prescribed medication, side effects of prescribed medications and when to take medications    Opportunities for ongoing education and support in diabetes-self management were discussed.    Pt verbalized understanding of concepts discussed and recommendations provided today.       Education Materials Provided:  Type 2 Basics Book, food logs, My Food Plan, Contour Next Meter Kit    PLAN:  See Patient Instructions for co-developed,  patient-stated behavior change goals.  Test BG every AM before food or coffee and 2 hours after evening meal.    Keep food logs    Return to Diabetes Ed on 12/5 19 at 3:30 pm    AVS printed and provided to patient today. See Follow-Up section for recommended follow-up.      Time Spent: 80 minutes  Encounter Type: Individual    Any diabetes medication dose changes were made via the CDE Protocol and Collaborative Practice Agreement with the patient's primary care provider. A copy of this encounter was shared with the provider.

## 2019-11-25 NOTE — TELEPHONE ENCOUNTER
Pt's wife called about test strips the pharmacy did not get an Rx for the test strips and lancets. I called and left a message this has been taken care of.

## 2019-11-26 ENCOUNTER — TELEPHONE (OUTPATIENT)
Dept: EDUCATION SERVICES | Facility: OTHER | Age: 59
End: 2019-11-26

## 2019-11-26 DIAGNOSIS — E11.9 NEW ONSET TYPE 2 DIABETES MELLITUS (H): ICD-10-CM

## 2019-11-26 NOTE — TELEPHONE ENCOUNTER
Pt's wife calls he needs an Rx sent in for his Lancets. He is [place] of [place] and will run out before they return.

## 2019-12-05 ENCOUNTER — HOSPITAL ENCOUNTER (OUTPATIENT)
Dept: EDUCATION SERVICES | Facility: HOSPITAL | Age: 59
Discharge: HOME | End: 2019-12-05
Attending: FAMILY MEDICINE | Admitting: FAMILY MEDICINE
Payer: COMMERCIAL

## 2019-12-05 VITALS
WEIGHT: 192.8 LBS | RESPIRATION RATE: 16 BRPM | BODY MASS INDEX: 29.22 KG/M2 | DIASTOLIC BLOOD PRESSURE: 64 MMHG | HEIGHT: 68 IN | SYSTOLIC BLOOD PRESSURE: 104 MMHG | OXYGEN SATURATION: 97 % | HEART RATE: 92 BPM

## 2019-12-05 DIAGNOSIS — E11.9 NEW ONSET TYPE 2 DIABETES MELLITUS (H): Primary | ICD-10-CM

## 2019-12-05 PROCEDURE — G0108 DIAB MANAGE TRN  PER INDIV: HCPCS

## 2019-12-05 ASSESSMENT — PAIN SCALES - GENERAL: PAINLEVEL: NO PAIN (0)

## 2019-12-05 ASSESSMENT — MIFFLIN-ST. JEOR: SCORE: 1655.07

## 2019-12-05 NOTE — PATIENT INSTRUCTIONS
Increase Metformin to 2 - 500 mg tablets twice a day. Ok to start with 1 tablet AM and 2 tablets PM for 3 days, then increase to 2 am and 2 pm.    Concerns: [phone]    Return 1/2/20 at 8:00 am

## 2019-12-05 NOTE — LETTER
"    12/5/2019        RE: Junior Castellanos  [address] [place] [address]        Diabetes Self-Management Education & Support    Diabetes Education Self Management & Training    SUBJECTIVE/OBJECTIVE:  Presents for: Follow-up  Accompanied by: Self, Spouse, Daughter  Diabetes education in the past 24mo: No  Focus of Visit: Healthy Eating, Monitoring, Taking Medication  Diabetes type: Type 2  Date of diagnosis: 11/2019  Disease course: Improving  Diabetes management related comments/concerns: medication increase?  Transportation concerns: No  Other concerns:: Glasses  Cultural Influences/Ethnic Background:  American      Diabetes Symptoms & Complications  Blurred vision: Yes  Fatigue: Yes  Neuropathy: Yes  Foot ulcerations: No  Polydipsia: No  Polyphagia: No  Polyuria: Yes  Visual change: No  Weakness: No  Weight loss: No  Slow healing wounds: No  Recent Infection(s): No  Symptom course: Improving  Weight trend: Decreasing steadily  Autonomic neuropathy: No  CVA: No  Heart disease: No  Nephropathy: No  Peripheral neuropathy: No  Peripheral Vascular Disease: No  Retinopathy: No    Patient Problem List and Family Medical History reviewed for relevant medical history, current medical status, and diabetes risk factors.    Vitals:  /64   Pulse 92   Resp 16   Ht 1.721 m (5' 7.75\")   Wt 87.5 kg (192 lb 12.8 oz)   SpO2 97%   BMI 29.53 kg/m     Estimated body mass index is 29.53 kg/m  as calculated from the following:    Height as of this encounter: 1.721 m (5' 7.75\").    Weight as of this encounter: 87.5 kg (192 lb 12.8 oz).   Last 3 BP:   BP Readings from Last 3 Encounters:   12/05/19 104/64   11/21/19 96/57   11/07/19 118/81       History   Smoking Status     Former Smoker     Types: Cigarettes   Smokeless Tobacco     Never Used       Labs:  Lab Results   Component Value Date    A1C 13.3 11/07/2019     Lab Results   Component Value Date     11/07/2019     Lab Results   Component Value Date    LDL 70 " 2019     HDL Cholesterol   Date Value Ref Range Status   2019 29 (L) 40 - 60 mg/dL Final   ]  GFR Estimate   Date Value Ref Range Status   2019 >90 >60 mL/min/[1.73_m2] Final     Comment:     Non  GFR Calc  Starting 2018, serum creatinine based estimated GFR (eGFR) will be   calculated using the Chronic Kidney Disease Epidemiology Collaboration   (CKD-EPI) equation.       GFR Estimate If Black   Date Value Ref Range Status   2019 >90 >60 mL/min/[1.73_m2] Final     Comment:      GFR Calc  Starting 2018, serum creatinine based estimated GFR (eGFR) will be   calculated using the Chronic Kidney Disease Epidemiology Collaboration   (CKD-EPI) equation.       Lab Results   Component Value Date    CR 0.70 2019     No results found for: MICROALBUMIN    Healthy Eating  Healthy Eating Assessed Today: Yes  Meal planning: Smaller portions, Heart healthy, Avoiding sweets  Meals include: Breakfast, Dinner, Snacks  Breakfast: plain oatmeal, fruit  Lunch: snacks: rice cakes with peanut butter or chicken caesar salad with vegetables or eggs and toast  Dinner: beef or chicken or fish with salad or hamburger/french fries or ground beef and macaronu  Snacks: nuts, fruit  Beverages: Water, Tea, Coffee, Other(Propel or Sobies)  Has patient met with a dietitian in the past?: No    Being Active  Being Active Assessed Today: Yes  Exercise:: Currently not exercising  Barrier to exercise: Access    Monitoring  Monitoring Assessed Today: Yes  Did patient bring glucose meter to appointment? : Yes  Blood Glucose Meter: [id number]  Home Glucose (Sugar) Monitorin-2 times per day  Blood glucose trend: No change  Overall Range (mg/dL): >200        Taking Medications  Diabetes Medication(s)     Biguanides       metFORMIN (GLUCOPHAGE) 500 MG tablet    Take 2 tablets (1,000 mg) by mouth 2 times daily (with meals)          Taking Medication Assessed Today: Yes  Current  Treatments: Oral Agent (monotherapy)  Problems taking diabetes medications regularly?: No  Diabetes medication side effects?: No  Treatment Compliance: All of the time    Problem Solving  Problem Solving Assessed Today: Yes  Hypoglycemia Frequency: Never              Reducing Risks  Reducing Risks Assessed Today: No  Has dilated eye exam at least once a year?: Yes    Healthy Coping  Healthy Coping Assessed Today: Yes  Emotional response to diabetes: Ready to learn  Informal Support system:: Family  Stage of change: ACTION (Actively working towards change)  Difficulty affording diabetes management supplies?: No  Support resources: Websites  Patient Activation Measure Survey Score:  No flowsheet data found.    ASSESSMENT:  Readings range as follows: fastin-265 and post-supper: 190-283    Junior has kept very thorough food logs and is making appropriate food choice and portion size.    Reviewed BG readings and discussed increasing Metformin    Goal: start exercising 3x weekly        Patient's most recent   Lab Results   Component Value Date    A1C 13.3 2019    is not meeting goal of <8.0    INTERVENTION:   Diabetes knowledge and skills assessment:     Patient is knowledgeable in diabetes management concepts related to: Healthy Eating, Monitoring and Taking Medication    Patient needs further education on the following diabetes management concepts: Being Active, Monitoring, Taking Medication and Problem Solving    Based on learning assessment above, most appropriate setting for further diabetes education would be: Individual setting.    Education provided today on:  AADE Self-Care Behaviors:  Diabetes Pathophysiology  Healthy Eating: consistency in amount, composition, and timing of food intake  Being Active: relationship to blood glucose, describe appropriate activity program and precautions to take  Monitoring: log and interpret results, individual blood glucose targets and frequency of monitoring  Taking  Medication: action of prescribed medication, side effects of prescribed medications, when to take medications and why to increasing Metformin  Problem Solving: high blood glucose - causes, signs/symptoms, treatment and prevention, low blood glucose - causes, signs/symptoms, treatment and prevention, carrying a carbohydrate source at all times, when to call health care provider and sick day arrangements    Opportunities for ongoing education and support in diabetes-self management were discussed.    Pt verbalized understanding of concepts discussed and recommendations provided today.       Education Materials Provided:  Hypoglycemia Signs and Symptoms    PLAN:  See Patient Instructions for co-developed, patient-stated behavior change goals.  Increase Metformin to 2 - 500 mg tablets twice a day. Ok to start with 1 tablet AM and 2 tablets PM for 3 days, then increase to 2 am and 2 pm.    Concerns: [phone]    Return 1/2/20 at 8:00 am  AVS printed and provided to patient today. See Follow-Up section for recommended follow-up.      Time Spent: 30 minutes  Encounter Type: Individual    Any diabetes medication dose changes were made via the CDE Protocol and Collaborative Practice Agreement with the patient's primary care provider. A copy of this encounter was shared with the provider.          Sincerely,        Linda Lacey RN

## 2019-12-06 NOTE — PROGRESS NOTES
"Diabetes Self-Management Education & Support    Diabetes Education Self Management & Training    SUBJECTIVE/OBJECTIVE:  Presents for: Follow-up  Accompanied by: Self, Spouse, Daughter  Diabetes education in the past 24mo: No  Focus of Visit: Healthy Eating, Monitoring, Taking Medication  Diabetes type: Type 2  Date of diagnosis: 11/2019  Disease course: Improving  Diabetes management related comments/concerns: medication increase?  Transportation concerns: No  Other concerns:: Glasses  Cultural Influences/Ethnic Background:  American      Diabetes Symptoms & Complications  Blurred vision: Yes  Fatigue: Yes  Neuropathy: Yes  Foot ulcerations: No  Polydipsia: No  Polyphagia: No  Polyuria: Yes  Visual change: No  Weakness: No  Weight loss: No  Slow healing wounds: No  Recent Infection(s): No  Symptom course: Improving  Weight trend: Decreasing steadily  Autonomic neuropathy: No  CVA: No  Heart disease: No  Nephropathy: No  Peripheral neuropathy: No  Peripheral Vascular Disease: No  Retinopathy: No    Patient Problem List and Family Medical History reviewed for relevant medical history, current medical status, and diabetes risk factors.    Vitals:  /64   Pulse 92   Resp 16   Ht 1.721 m (5' 7.75\")   Wt 87.5 kg (192 lb 12.8 oz)   SpO2 97%   BMI 29.53 kg/m    Estimated body mass index is 29.53 kg/m  as calculated from the following:    Height as of this encounter: 1.721 m (5' 7.75\").    Weight as of this encounter: 87.5 kg (192 lb 12.8 oz).   Last 3 BP:   BP Readings from Last 3 Encounters:   12/05/19 104/64   11/21/19 96/57   11/07/19 118/81       History   Smoking Status     Former Smoker     Types: Cigarettes   Smokeless Tobacco     Never Used       Labs:  Lab Results   Component Value Date    A1C 13.3 11/07/2019     Lab Results   Component Value Date     11/07/2019     Lab Results   Component Value Date    LDL 70 11/18/2019     HDL Cholesterol   Date Value Ref Range Status   11/18/2019 29 (L) 40 - 60 " mg/dL Final   ]  GFR Estimate   Date Value Ref Range Status   2019 >90 >60 mL/min/[1.73_m2] Final     Comment:     Non  GFR Calc  Starting 2018, serum creatinine based estimated GFR (eGFR) will be   calculated using the Chronic Kidney Disease Epidemiology Collaboration   (CKD-EPI) equation.       GFR Estimate If Black   Date Value Ref Range Status   2019 >90 >60 mL/min/[1.73_m2] Final     Comment:      GFR Calc  Starting 2018, serum creatinine based estimated GFR (eGFR) will be   calculated using the Chronic Kidney Disease Epidemiology Collaboration   (CKD-EPI) equation.       Lab Results   Component Value Date    CR 0.70 2019     No results found for: MICROALBUMIN    Healthy Eating  Healthy Eating Assessed Today: Yes  Meal planning: Smaller portions, Heart healthy, Avoiding sweets  Meals include: Breakfast, Dinner, Snacks  Breakfast: plain oatmeal, fruit  Lunch: snacks: rice cakes with peanut butter or chicken caesar salad with vegetables or eggs and toast  Dinner: beef or chicken or fish with salad or hamburger/french fries or ground beef and macaronu  Snacks: nuts, fruit  Beverages: Water, Tea, Coffee, Other(Propel or Sobies)  Has patient met with a dietitian in the past?: No    Being Active  Being Active Assessed Today: Yes  Exercise:: Currently not exercising  Barrier to exercise: Access    Monitoring  Monitoring Assessed Today: Yes  Did patient bring glucose meter to appointment? : Yes  Blood Glucose Meter: ContourNext  Home Glucose (Sugar) Monitorin-2 times per day  Blood glucose trend: No change  Overall Range (mg/dL): >200        Taking Medications  Diabetes Medication(s)     Biguanides       metFORMIN (GLUCOPHAGE) 500 MG tablet    Take 2 tablets (1,000 mg) by mouth 2 times daily (with meals)          Taking Medication Assessed Today: Yes  Current Treatments: Oral Agent (monotherapy)  Problems taking diabetes medications regularly?:  No  Diabetes medication side effects?: No  Treatment Compliance: All of the time    Problem Solving  Problem Solving Assessed Today: Yes  Hypoglycemia Frequency: Never              Reducing Risks  Reducing Risks Assessed Today: No  Has dilated eye exam at least once a year?: Yes    Healthy Coping  Healthy Coping Assessed Today: Yes  Emotional response to diabetes: Ready to learn  Informal Support system:: Family  Stage of change: ACTION (Actively working towards change)  Difficulty affording diabetes management supplies?: No  Support resources: Websites  Patient Activation Measure Survey Score:  No flowsheet data found.    ASSESSMENT:  Readings range as follows: fastin-265 and post-supper: 190-283    Junior has kept very thorough food logs and is making appropriate food choice and portion size.    Reviewed BG readings and discussed increasing Metformin    Goal: start exercising 3x weekly        Patient's most recent   Lab Results   Component Value Date    A1C 13.3 2019    is not meeting goal of <8.0    INTERVENTION:   Diabetes knowledge and skills assessment:     Patient is knowledgeable in diabetes management concepts related to: Healthy Eating, Monitoring and Taking Medication    Patient needs further education on the following diabetes management concepts: Being Active, Monitoring, Taking Medication and Problem Solving    Based on learning assessment above, most appropriate setting for further diabetes education would be: Individual setting.    Education provided today on:  AADE Self-Care Behaviors:  Diabetes Pathophysiology  Healthy Eating: consistency in amount, composition, and timing of food intake  Being Active: relationship to blood glucose, describe appropriate activity program and precautions to take  Monitoring: log and interpret results, individual blood glucose targets and frequency of monitoring  Taking Medication: action of prescribed medication, side effects of prescribed medications,  when to take medications and why to increasing Metformin  Problem Solving: high blood glucose - causes, signs/symptoms, treatment and prevention, low blood glucose - causes, signs/symptoms, treatment and prevention, carrying a carbohydrate source at all times, when to call health care provider and sick day arrangements    Opportunities for ongoing education and support in diabetes-self management were discussed.    Pt verbalized understanding of concepts discussed and recommendations provided today.       Education Materials Provided:  Hypoglycemia Signs and Symptoms    PLAN:  See Patient Instructions for co-developed, patient-stated behavior change goals.  Increase Metformin to 2 - 500 mg tablets twice a day. Ok to start with 1 tablet AM and 2 tablets PM for 3 days, then increase to 2 am and 2 pm.    Concerns: [phone]    Return 1/2/20 at 8:00 am  AVS printed and provided to patient today. See Follow-Up section for recommended follow-up.      Time Spent: 30 minutes  Encounter Type: Individual    Any diabetes medication dose changes were made via the CDE Protocol and Collaborative Practice Agreement with the patient's primary care provider. A copy of this encounter was shared with the provider.

## 2020-01-02 ENCOUNTER — HOSPITAL ENCOUNTER (OUTPATIENT)
Dept: EDUCATION SERVICES | Facility: HOSPITAL | Age: 60
Discharge: HOME | End: 2020-01-02
Attending: NURSE PRACTITIONER | Admitting: FAMILY MEDICINE
Payer: COMMERCIAL

## 2020-01-02 VITALS
HEART RATE: 88 BPM | HEIGHT: 68 IN | BODY MASS INDEX: 29.1 KG/M2 | OXYGEN SATURATION: 95 % | WEIGHT: 192 LBS | DIASTOLIC BLOOD PRESSURE: 73 MMHG | SYSTOLIC BLOOD PRESSURE: 118 MMHG

## 2020-01-02 DIAGNOSIS — E11.65 TYPE 2 DIABETES MELLITUS WITH HYPERGLYCEMIA, WITHOUT LONG-TERM CURRENT USE OF INSULIN (H): Primary | ICD-10-CM

## 2020-01-02 PROCEDURE — G0108 DIAB MANAGE TRN  PER INDIV: HCPCS

## 2020-01-02 ASSESSMENT — MIFFLIN-ST. JEOR: SCORE: 1646.44

## 2020-01-02 ASSESSMENT — PAIN SCALES - GENERAL: PAINLEVEL: NO PAIN (0)

## 2020-01-02 NOTE — PROGRESS NOTES
"Diabetes Self-Management Education & Support    Diabetes Education Self Management & Training    SUBJECTIVE/OBJECTIVE:  Presents for: Follow-up  Accompanied by: Self, Spouse  Diabetes education in the past 24mo: No  Focus of Visit: Healthy Eating, Monitoring, Taking Medication  Diabetes type: Type 2  Date of diagnosis: 11/2019  Disease course: Improving  Diabetes management related comments/concerns: started exercising  Transportation concerns: No  Other concerns:: Glasses  Cultural Influences/Ethnic Background:  American      Diabetes Symptoms & Complications  Blurred vision: No  Fatigue: No  Neuropathy: No  Foot ulcerations: No  Polydipsia: No  Polyphagia: No  Polyuria: No  Visual change: No  Weakness: No  Weight loss: No  Slow healing wounds: No  Recent Infection(s): No  Symptom course: Improving  Weight trend: Decreasing steadily  Autonomic neuropathy: No  CVA: No  Heart disease: No  Nephropathy: No  Peripheral neuropathy: No  Peripheral Vascular Disease: No  Retinopathy: No    Patient Problem List and Family Medical History reviewed for relevant medical history, current medical status, and diabetes risk factors.    Vitals:  /73 (BP Location: Right arm, Patient Position: Sitting, Cuff Size: Adult Regular)   Pulse 88   Ht 1.721 m (5' 7.75\")   Wt 87.1 kg (192 lb)   SpO2 95%   BMI 29.41 kg/m    Estimated body mass index is 29.41 kg/m  as calculated from the following:    Height as of this encounter: 1.721 m (5' 7.75\").    Weight as of this encounter: 87.1 kg (192 lb).   Last 3 BP:   BP Readings from Last 3 Encounters:   01/02/20 118/73   12/05/19 104/64   11/21/19 96/57       History   Smoking Status     Former Smoker     Types: Cigarettes   Smokeless Tobacco     Never Used       Labs:  Lab Results   Component Value Date    A1C 13.3 11/07/2019     Lab Results   Component Value Date     11/07/2019     Lab Results   Component Value Date    LDL 70 11/18/2019     HDL Cholesterol   Date Value Ref Range " Status   2019 29 (L) 40 - 60 mg/dL Final   ]  GFR Estimate   Date Value Ref Range Status   2019 >90 >60 mL/min/[1.73_m2] Final     Comment:     Non  GFR Calc  Starting 2018, serum creatinine based estimated GFR (eGFR) will be   calculated using the Chronic Kidney Disease Epidemiology Collaboration   (CKD-EPI) equation.       GFR Estimate If Black   Date Value Ref Range Status   2019 >90 >60 mL/min/[1.73_m2] Final     Comment:      GFR Calc  Starting 2018, serum creatinine based estimated GFR (eGFR) will be   calculated using the Chronic Kidney Disease Epidemiology Collaboration   (CKD-EPI) equation.       Lab Results   Component Value Date    CR 0.70 2019     No results found for: MICROALBUMIN    Healthy Eating  Healthy Eating Assessed Today: Yes  Patient on a regular basis: Eats 3 meals a day  Meal planning: Smaller portions, Heart healthy, Avoiding sweets, Carbohydrate counting  Meals include: Breakfast, Dinner, Snacks  Breakfast: plain oatmeal, peanut butter, fruit  Lunch: salad with chicken, cheese and dressing or chicken with mashed potatoes and gravy  Dinner: beef or chicken or fish with salad or vegetablesor hamburger/french fries or ground beef and macaroni or 2 tacos  Snacks: nuts, fruit  Beverages: Water, Tea, Coffee, Other(Propel or Sobies)  Has patient met with a dietitian in the past?: No    Being Active  Being Active Assessed Today: Yes  Exercise:: Yes  Days per week of moderate to strenuous exercise (like a brisk walk): 3  On average, minutes per day of exercise at this level: 20  How intense was your typical exercise? : Light (like stretching or slow walking)  Exercise Minutes per Week: 60  Barrier to exercise: None    Monitoring  Monitoring Assessed Today: Yes  Did patient bring glucose meter to appointment? : Yes  Blood Glucose Meter: Furious  Home Glucose (Sugar) Monitorin-2 times per day  Blood glucose trend: Decreasing  steadily  Overall Range (mg/dL): >200        Taking Medications  Diabetes Medication(s)     Biguanides       metFORMIN (GLUCOPHAGE) 500 MG tablet    Take 2 tablets (1,000 mg) by mouth 2 times daily (with meals)          Taking Medication Assessed Today: Yes  Current Treatments: Oral Agent (monotherapy)  Problems taking diabetes medications regularly?: No  Diabetes medication side effects?: No  Treatment Compliance: All of the time    Problem Solving  Problem Solving Assessed Today: Yes  Hypoglycemia Frequency: Never              Reducing Risks  Reducing Risks Assessed Today: No  Has dilated eye exam at least once a year?: Yes    Healthy Coping  Healthy Coping Assessed Today: Yes  Emotional response to diabetes: Ready to learn  Informal Support system:: Family  Stage of change: ACTION (Actively working towards change)  Difficulty affording diabetes management supplies?: No  Support resources: Websites  Patient Activation Measure Survey Score:  No flowsheet data found.    ASSESSMENT:  Readings range as follows: fastin-166, 202 and post-supper: 139-144, 165, 181, 207. BG readings trending down after Metformin increase.    Goals Addressed as of 2020                 Today      Exercise 3x per week (30 min per time)   On track    Added 19 by Linda Lacey RN     My Goal: I will exercise 3 times a week    What I need to meet my goal: move exercise bike upstairs    I plan to meet my goal by this date: next appointment            Patient's most recent   Lab Results   Component Value Date    A1C 13.3 2019    is not meeting goal of <8.0    INTERVENTION:   Diabetes knowledge and skills assessment:     Patient is knowledgeable in diabetes management concepts related to: Healthy Eating, Being Active, Monitoring and Taking Medication    Patient needs further education on the following diabetes management concepts: Problem Solving and Reducing Risks    Based on learning assessment above, most appropriate setting  for further diabetes education would be: Individual setting.    Education provided today on:  AADE Self-Care Behaviors:  Monitoring: log and interpret results, individual blood glucose targets and frequency of monitoring  Taking Medication: action of prescribed medication, side effects of prescribed medications and when to take medications  Reducing Risks: prevention, early diagnostic measures and treatment of complications, aspirin therapy, A1C - goals, relating to blood glucose levels, how often to check, lipids levels and goals, blood pressure and goals and health heart topics    Opportunities for ongoing education and support in diabetes-self management were discussed.    Pt verbalized understanding of concepts discussed and recommendations provided today.       Education Materials Provided:  No new materials provided today    PLAN:  See Patient Instructions for co-developed, patient-stated behavior change goals.  Continue current plan  Following up with a Dr. Ureña upcoming appointment.  Return to Diabetes Ed 2/10/20 at 0800  AVS printed and provided to patient today. See Follow-Up section for recommended follow-up.      Time Spent: 45 minutes  Encounter Type: Individual    Any diabetes medication dose changes were made via the CDE Protocol and Collaborative Practice Agreement with the patient's primary care provider. A copy of this encounter was shared with the provider.

## 2020-01-02 NOTE — LETTER
"    1/2/2020        RE: Junior Castellanos  [address] [place] [address]        Diabetes Self-Management Education & Support    Diabetes Education Self Management & Training    SUBJECTIVE/OBJECTIVE:  Presents for: Follow-up  Accompanied by: Self, Spouse  Diabetes education in the past 24mo: No  Focus of Visit: Healthy Eating, Monitoring, Taking Medication  Diabetes type: Type 2  Date of diagnosis: 11/2019  Disease course: Improving  Diabetes management related comments/concerns: started exercising  Transportation concerns: No  Other concerns:: Glasses  Cultural Influences/Ethnic Background:  American      Diabetes Symptoms & Complications  Blurred vision: No  Fatigue: No  Neuropathy: No  Foot ulcerations: No  Polydipsia: No  Polyphagia: No  Polyuria: No  Visual change: No  Weakness: No  Weight loss: No  Slow healing wounds: No  Recent Infection(s): No  Symptom course: Improving  Weight trend: Decreasing steadily  Autonomic neuropathy: No  CVA: No  Heart disease: No  Nephropathy: No  Peripheral neuropathy: No  Peripheral Vascular Disease: No  Retinopathy: No    Patient Problem List and Family Medical History reviewed for relevant medical history, current medical status, and diabetes risk factors.    Vitals:  /73 (BP Location: Right arm, Patient Position: Sitting, Cuff Size: Adult Regular)   Pulse 88   Ht 1.721 m (5' 7.75\")   Wt 87.1 kg (192 lb)   SpO2 95%   BMI 29.41 kg/m     Estimated body mass index is 29.41 kg/m  as calculated from the following:    Height as of this encounter: 1.721 m (5' 7.75\").    Weight as of this encounter: 87.1 kg (192 lb).   Last 3 BP:   BP Readings from Last 3 Encounters:   01/02/20 118/73   12/05/19 104/64   11/21/19 96/57       History   Smoking Status     Former Smoker     Types: Cigarettes   Smokeless Tobacco     Never Used       Labs:  Lab Results   Component Value Date    A1C 13.3 11/07/2019     Lab Results   Component Value Date     11/07/2019     Lab Results "   Component Value Date    LDL 70 11/18/2019     HDL Cholesterol   Date Value Ref Range Status   11/18/2019 29 (L) 40 - 60 mg/dL Final   ]  GFR Estimate   Date Value Ref Range Status   11/07/2019 >90 >60 mL/min/[1.73_m2] Final     Comment:     Non  GFR Calc  Starting 12/18/2018, serum creatinine based estimated GFR (eGFR) will be   calculated using the Chronic Kidney Disease Epidemiology Collaboration   (CKD-EPI) equation.       GFR Estimate If Black   Date Value Ref Range Status   11/07/2019 >90 >60 mL/min/[1.73_m2] Final     Comment:      GFR Calc  Starting 12/18/2018, serum creatinine based estimated GFR (eGFR) will be   calculated using the Chronic Kidney Disease Epidemiology Collaboration   (CKD-EPI) equation.       Lab Results   Component Value Date    CR 0.70 11/07/2019     No results found for: MICROALBUMIN    Healthy Eating  Healthy Eating Assessed Today: Yes  Patient on a regular basis: Eats 3 meals a day  Meal planning: Smaller portions, Heart healthy, Avoiding sweets, Carbohydrate counting  Meals include: Breakfast, Dinner, Snacks  Breakfast: plain oatmeal, peanut butter, fruit  Lunch: salad with chicken, cheese and dressing or chicken with mashed potatoes and gravy  Dinner: beef or chicken or fish with salad or vegetablesor hamburger/french fries or ground beef and macaroni or 2 tacos  Snacks: nuts, fruit  Beverages: Water, Tea, Coffee, Other(Propel or Sobies)  Has patient met with a dietitian in the past?: No    Being Active  Being Active Assessed Today: Yes  Exercise:: Yes  Days per week of moderate to strenuous exercise (like a brisk walk): 3  On average, minutes per day of exercise at this level: 20  How intense was your typical exercise? : Light (like stretching or slow walking)  Exercise Minutes per Week: 60  Barrier to exercise: None    Monitoring  Monitoring Assessed Today: Yes  Did patient bring glucose meter to appointment? : Yes  Blood Glucose Meter:  ContourNext  Home Glucose (Sugar) Monitorin-2 times per day  Blood glucose trend: Decreasing steadily  Overall Range (mg/dL): >200        Taking Medications  Diabetes Medication(s)     Biguanides       metFORMIN (GLUCOPHAGE) 500 MG tablet    Take 2 tablets (1,000 mg) by mouth 2 times daily (with meals)          Taking Medication Assessed Today: Yes  Current Treatments: Oral Agent (monotherapy)  Problems taking diabetes medications regularly?: No  Diabetes medication side effects?: No  Treatment Compliance: All of the time    Problem Solving  Problem Solving Assessed Today: Yes  Hypoglycemia Frequency: Never              Reducing Risks  Reducing Risks Assessed Today: No  Has dilated eye exam at least once a year?: Yes    Healthy Coping  Healthy Coping Assessed Today: Yes  Emotional response to diabetes: Ready to learn  Informal Support system:: Family  Stage of change: ACTION (Actively working towards change)  Difficulty affording diabetes management supplies?: No  Support resources: Websites  Patient Activation Measure Survey Score:  No flowsheet data found.    ASSESSMENT:  Readings range as follows: fastin-166, 202 and post-supper: 139-144, 165, 181, 207. BG readings trending down after Metformin increase.    Goals Addressed as of 2020                 Today      Exercise 3x per week (30 min per time)   On track    Added 19 by Linda Lacey RN     My Goal: I will exercise 3 times a week    What I need to meet my goal: move exercise bike upstairs    I plan to meet my goal by this date: next appointment            Patient's most recent   Lab Results   Component Value Date    A1C 13.3 2019    is not meeting goal of <8.0    INTERVENTION:   Diabetes knowledge and skills assessment:     Patient is knowledgeable in diabetes management concepts related to: Healthy Eating, Being Active, Monitoring and Taking Medication    Patient needs further education on the following diabetes management concepts:  Problem Solving and Reducing Risks    Based on learning assessment above, most appropriate setting for further diabetes education would be: Individual setting.    Education provided today on:  AADE Self-Care Behaviors:  Monitoring: log and interpret results, individual blood glucose targets and frequency of monitoring  Taking Medication: action of prescribed medication, side effects of prescribed medications and when to take medications  Reducing Risks: prevention, early diagnostic measures and treatment of complications, aspirin therapy, A1C - goals, relating to blood glucose levels, how often to check, lipids levels and goals, blood pressure and goals and health heart topics    Opportunities for ongoing education and support in diabetes-self management were discussed.    Pt verbalized understanding of concepts discussed and recommendations provided today.       Education Materials Provided:  No new materials provided today    PLAN:  See Patient Instructions for co-developed, patient-stated behavior change goals.  Continue current plan  Following up with a Dr. Ureña upcoming appointment.  Return to Diabetes Ed 2/10/20 at 0800  AVS printed and provided to patient today. See Follow-Up section for recommended follow-up.      Time Spent: 45 minutes  Encounter Type: Individual    Any diabetes medication dose changes were made via the CDE Protocol and Collaborative Practice Agreement with the patient's primary care provider. A copy of this encounter was shared with the provider.          Sincerely,        Linda Lacey RN

## 2020-01-17 LAB — HBA1C MFR BLD: 7.8 % (ref 0–5.7)

## 2020-01-22 PROBLEM — E11.9 TYPE 2 DIABETES MELLITUS WITHOUT COMPLICATION, WITHOUT LONG-TERM CURRENT USE OF INSULIN (H): Status: ACTIVE | Noted: 2019-11-18

## 2020-02-10 ENCOUNTER — HOSPITAL ENCOUNTER (OUTPATIENT)
Dept: EDUCATION SERVICES | Facility: HOSPITAL | Age: 60
Discharge: HOME | End: 2020-02-10
Attending: NURSE PRACTITIONER | Admitting: FAMILY MEDICINE
Payer: COMMERCIAL

## 2020-02-10 VITALS
DIASTOLIC BLOOD PRESSURE: 70 MMHG | HEIGHT: 68 IN | BODY MASS INDEX: 26.7 KG/M2 | SYSTOLIC BLOOD PRESSURE: 108 MMHG | RESPIRATION RATE: 16 BRPM | WEIGHT: 176.2 LBS | HEART RATE: 104 BPM | OXYGEN SATURATION: 98 %

## 2020-02-10 DIAGNOSIS — E11.9 TYPE 2 DIABETES MELLITUS WITHOUT COMPLICATION, WITHOUT LONG-TERM CURRENT USE OF INSULIN (H): Primary | ICD-10-CM

## 2020-02-10 PROCEDURE — G0108 DIAB MANAGE TRN  PER INDIV: HCPCS

## 2020-02-10 RX ORDER — OMEPRAZOLE 40 MG/1
40 CAPSULE, DELAYED RELEASE ORAL DAILY
COMMUNITY
Start: 2019-12-09 | End: 2022-11-16

## 2020-02-10 RX ORDER — METFORMIN HCL 500 MG
1000 TABLET, EXTENDED RELEASE 24 HR ORAL
Qty: 120 TABLET | Refills: 3 | Status: SHIPPED | OUTPATIENT
Start: 2020-02-10 | End: 2020-02-11 | Stop reason: DRUGHIGH

## 2020-02-10 ASSESSMENT — MIFFLIN-ST. JEOR: SCORE: 1574.77

## 2020-02-10 ASSESSMENT — PAIN SCALES - GENERAL: PAINLEVEL: NO PAIN (0)

## 2020-02-10 NOTE — LETTER
"    2/10/2020        RE: Junior Castellanos  [address] [place] [address]        Chief Complaint   Patient presents with     Diabetes Education       Initial /70   Pulse 104   Resp 16   Ht 1.721 m (5' 7.75\")   Wt 79.9 kg (176 lb 3.2 oz)   SpO2 98%   BMI 26.99 kg/m    Estimated body mass index is 26.99 kg/m  as calculated from the following:    Height as of this encounter: 1.721 m (5' 7.75\").    Weight as of this encounter: 79.9 kg (176 lb 3.2 oz).  Medication Reconciliation: complete  Luisana Pederson LPN      Diabetes Self-Management Education & Support    Presents for: Session 4    SUBJECTIVE/OBJECTIVE:  Presents for: Follow-up (Session 4)  Accompanied by: Self, Spouse  Diabetes education in the past 24mo: No  Focus of Visit: Healthy Eating, Monitoring, Taking Medication  Diabetes type: Type 2  Date of diagnosis: 11/2019  Disease course: Improving  Diabetes management related comments/concerns: stomach upset  Transportation concerns: No  Difficulty affording diabetes medication?: No  Difficulty affording diabetes testing supplies?: No  Other concerns:: Glasses  Cultural Influences/Ethnic Background:  American      Diabetes Symptoms & Complications:  Fatigue: No  Neuropathy: No  Polydipsia: No  Polyphagia: No  Polyuria: No  Visual change: No  Slow healing wounds: No  Symptom course: Improving  Complications assessed today?: Yes  Autonomic neuropathy: No  CVA: No  Heart disease: No  Nephropathy: No  Peripheral neuropathy: No  Foot ulcerations: No  Peripheral Vascular Disease: No  Retinopathy: No    Patient Problem List and Family Medical History reviewed for relevant medical history, current medical status, and diabetes risk factors.    Vitals:  /70   Pulse 104   Resp 16   Ht 1.721 m (5' 7.75\")   Wt 79.9 kg (176 lb 3.2 oz)   SpO2 98%   BMI 26.99 kg/m     Estimated body mass index is 26.99 kg/m  as calculated from the following:    Height as of this encounter: 1.721 m (5' 7.75\").    " Weight as of this encounter: 79.9 kg (176 lb 3.2 oz).   Last 3 BP:   BP Readings from Last 3 Encounters:   02/10/20 108/70   01/02/20 118/73   12/05/19 104/64       History   Smoking Status     Former Smoker     Types: Cigarettes   Smokeless Tobacco     Never Used       Labs:  Lab Results   Component Value Date    A1C 7.8 01/17/2020     Lab Results   Component Value Date     11/07/2019     Lab Results   Component Value Date    LDL 70 11/18/2019     HDL Cholesterol   Date Value Ref Range Status   11/18/2019 29 (L) 40 - 60 mg/dL Final   ]  GFR Estimate   Date Value Ref Range Status   11/07/2019 >90 >60 mL/min/[1.73_m2] Final     Comment:     Non  GFR Calc  Starting 12/18/2018, serum creatinine based estimated GFR (eGFR) will be   calculated using the Chronic Kidney Disease Epidemiology Collaboration   (CKD-EPI) equation.       GFR Estimate If Black   Date Value Ref Range Status   11/07/2019 >90 >60 mL/min/[1.73_m2] Final     Comment:      GFR Calc  Starting 12/18/2018, serum creatinine based estimated GFR (eGFR) will be   calculated using the Chronic Kidney Disease Epidemiology Collaboration   (CKD-EPI) equation.       Lab Results   Component Value Date    CR 0.70 11/07/2019     No results found for: MICROALBUMIN    Healthy Eating:  Healthy Eating Assessed Today: Yes  Meal planning/habits: Carb counting  Meals include: Breakfast, Lunch, Dinner  Breakfast: plain oatmeal, peanut butter, fruit or cherios with soy milk  Lunch: tuna with trevizo and crackers, eggs/cheese/sausage, chicken nuggets with salad, hot dogs with mustard  Dinner: beef or chicken or fish with salad or vegetablesor hamburger/french fries or ground beef and macaroni or 2 tacos  Snacks: nuts, fruit, animal crackers  Beverages: Water, Tea, Coffee, Other, Milk(flavored water, soy milk)  Has patient met with a dietitian in the past?: No    Being Active:  Being Active Assessed Today: Yes  Exercise:: Yes  Days per week of  "moderate to strenuous exercise (like a brisk walk): 3  On average, minutes per day of exercise at this level: 20  How intense was your typical exercise? : Light (like stretching or slow walking)  Exercise Minutes per Week: 60  Barrier to exercise: None    On track with exercising. Now exercising time has increased to 20\"    Monitoring:  Monitoring Assessed Today: Yes  Did patient bring glucose meter to appointment? : Yes  Blood Glucose Meter: ContourNext  Times checking blood sugar at home (number): 2  Times checking blood sugar at home (per): Day  Blood glucose trend: Decreasing    Readings range as follows: fastin-158    Taking Medications:  Metformin 500 mg - 2 tablets (1000 mg) twice a day    Taking Medication Assessed Today: Yes  Problems taking diabetes medications regularly?: No  Diabetes medication side effects?: Yes? States having stomach    Problem Solving:  Problem Solving Assessed Today: Yes  Hypoglycemia Frequency: Never  Is the patient at risk for DKA?: No  Does patient have severe weather/disaster plan for diabetes management?: No  Does patient have sick day plan for diabetes management?: No    Reducing Risks:  Reducing Risks Assessed Today: No  Has dilated eye exam at least once a year?: Yes    Healthy Coping:  Healthy Coping Assessed Today: Yes  Emotional response to diabetes: Ready to learn  Informal Support system:: Family  Stage of change: ACTION (Actively working towards change)  Support resources: Websites  Patient Activation Measure Survey Score:  No flowsheet data found.    Diabetes knowledge and skills assessment:   Patient is knowledgeable in diabetes management concepts related to: Healthy Eating, Being Active, Monitoring and Taking Medication    Patient needs further education on the following diabetes management concepts: Reducing Risks and Healthy Coping    Based on learning assessment above, most appropriate setting for further diabetes education would be: Individual " setting.      INTERVENTIONS:    Education provided today on:  AADE Self-Care Behaviors:  Diabetes Pathophysiology  Healthy Eating: carbohydrate counting, consistency in amount, composition, and timing of food intake and portion control  Being Active: relationship to blood glucose, describe appropriate activity program and precautions to take  Monitoring: log and interpret results, individual blood glucose targets and frequency of monitoring  Taking Medication: action of prescribed medication, side effects of prescribed medications and when to take medications  Reducing Risks: prevention, early diagnostic measures and treatment of complications, A1C - goals, relating to blood glucose levels, how often to check and blood pressure and goals    Opportunities for ongoing education and support in diabetes-self management were discussed.    Pt verbalized understanding of concepts discussed and recommendations provided today.       Education Materials Provided:  No new materials provided today      ASSESSMENT:  Goals Addressed as of 2/10/2020                 Today    1/2/20      Exercise 3x per week (30 min per time)   On track  On track    Added 12/9/19 by Linda Lacey RN     My Goal: I will exercise 3 times a week    What I need to meet my goal: move exercise bike upstairs    I plan to meet my goal by this date: next appointment              Patient's most recent   Lab Results   Component Value Date    A1C 7.8 01/17/2020    is meeting goal of <8.0    PLAN  See Patient Instructions for co-developed, patient-stated behavior change goals.  Change Metformin to Metformin XR (extended release) : 2 - 500 mg tablets(1000 mg) twice a day    Call with questions or concerns: [phone]    Return to Diabetes Ed 3/10/20 at 8:00 am    AVS printed and provided to patient today. See Follow-Up section for recommended follow-up.      Time Spent: 30 minutes  Encounter Type: Individual    Any diabetes medication dose changes were made via  the CDE Protocol and Collaborative Practice Agreement with the patient's primary care provider. A copy of this encounter was shared with the provider.          Sincerely,        Linda Lacey RN

## 2020-02-10 NOTE — PROGRESS NOTES
"Diabetes Self-Management Education & Support    Presents for: Session 4    SUBJECTIVE/OBJECTIVE:  Presents for: Follow-up (Session 4)  Accompanied by: Self, Spouse  Diabetes education in the past 24mo: No  Focus of Visit: Healthy Eating, Monitoring, Taking Medication  Diabetes type: Type 2  Date of diagnosis: 11/2019  Disease course: Improving  Diabetes management related comments/concerns: stomach upset  Transportation concerns: No  Difficulty affording diabetes medication?: No  Difficulty affording diabetes testing supplies?: No  Other concerns:: Glasses  Cultural Influences/Ethnic Background:  American      Diabetes Symptoms & Complications:  Fatigue: No  Neuropathy: No  Polydipsia: No  Polyphagia: No  Polyuria: No  Visual change: No  Slow healing wounds: No  Symptom course: Improving  Complications assessed today?: Yes  Autonomic neuropathy: No  CVA: No  Heart disease: No  Nephropathy: No  Peripheral neuropathy: No  Foot ulcerations: No  Peripheral Vascular Disease: No  Retinopathy: No    Patient Problem List and Family Medical History reviewed for relevant medical history, current medical status, and diabetes risk factors.    Vitals:  /70   Pulse 104   Resp 16   Ht 1.721 m (5' 7.75\")   Wt 79.9 kg (176 lb 3.2 oz)   SpO2 98%   BMI 26.99 kg/m    Estimated body mass index is 26.99 kg/m  as calculated from the following:    Height as of this encounter: 1.721 m (5' 7.75\").    Weight as of this encounter: 79.9 kg (176 lb 3.2 oz).   Last 3 BP:   BP Readings from Last 3 Encounters:   02/10/20 108/70   01/02/20 118/73   12/05/19 104/64       History   Smoking Status     Former Smoker     Types: Cigarettes   Smokeless Tobacco     Never Used       Labs:  Lab Results   Component Value Date    A1C 7.8 01/17/2020     Lab Results   Component Value Date     11/07/2019     Lab Results   Component Value Date    LDL 70 11/18/2019     HDL Cholesterol   Date Value Ref Range Status   11/18/2019 29 (L) 40 - 60 mg/dL " "Final   ]  GFR Estimate   Date Value Ref Range Status   11/07/2019 >90 >60 mL/min/[1.73_m2] Final     Comment:     Non  GFR Calc  Starting 12/18/2018, serum creatinine based estimated GFR (eGFR) will be   calculated using the Chronic Kidney Disease Epidemiology Collaboration   (CKD-EPI) equation.       GFR Estimate If Black   Date Value Ref Range Status   11/07/2019 >90 >60 mL/min/[1.73_m2] Final     Comment:      GFR Calc  Starting 12/18/2018, serum creatinine based estimated GFR (eGFR) will be   calculated using the Chronic Kidney Disease Epidemiology Collaboration   (CKD-EPI) equation.       Lab Results   Component Value Date    CR 0.70 11/07/2019     No results found for: MICROALBUMIN    Healthy Eating:  Healthy Eating Assessed Today: Yes  Meal planning/habits: Carb counting  Meals include: Breakfast, Lunch, Dinner  Breakfast: plain oatmeal, peanut butter, fruit or cherios with soy milk  Lunch: tuna with trevizo and crackers, eggs/cheese/sausage, chicken nuggets with salad, hot dogs with mustard  Dinner: beef or chicken or fish with salad or vegetablesor hamburger/french fries or ground beef and macaroni or 2 tacos  Snacks: nuts, fruit, animal crackers  Beverages: Water, Tea, Coffee, Other, Milk(flavored water, soy milk)  Has patient met with a dietitian in the past?: No    Being Active:  Being Active Assessed Today: Yes  Exercise:: Yes  Days per week of moderate to strenuous exercise (like a brisk walk): 3  On average, minutes per day of exercise at this level: 20  How intense was your typical exercise? : Light (like stretching or slow walking)  Exercise Minutes per Week: 60  Barrier to exercise: None    On track with exercising. Now exercising time has increased to 20\"    Monitoring:  Monitoring Assessed Today: Yes  Did patient bring glucose meter to appointment? : Yes  Blood Glucose Meter: ContourNext  Times checking blood sugar at home (number): 2  Times checking blood sugar at " home (per): Day  Blood glucose trend: Decreasing    Readings range as follows: fastin-158    Taking Medications:  Metformin 500 mg - 2 tablets (1000 mg) twice a day    Taking Medication Assessed Today: Yes  Problems taking diabetes medications regularly?: No  Diabetes medication side effects?: Yes? States having stomach    Problem Solving:  Problem Solving Assessed Today: Yes  Hypoglycemia Frequency: Never  Is the patient at risk for DKA?: No  Does patient have severe weather/disaster plan for diabetes management?: No  Does patient have sick day plan for diabetes management?: No    Reducing Risks:  Reducing Risks Assessed Today: No  Has dilated eye exam at least once a year?: Yes    Healthy Coping:  Healthy Coping Assessed Today: Yes  Emotional response to diabetes: Ready to learn  Informal Support system:: Family  Stage of change: ACTION (Actively working towards change)  Support resources: Websites  Patient Activation Measure Survey Score:  No flowsheet data found.    Diabetes knowledge and skills assessment:   Patient is knowledgeable in diabetes management concepts related to: Healthy Eating, Being Active, Monitoring and Taking Medication    Patient needs further education on the following diabetes management concepts: Reducing Risks and Healthy Coping    Based on learning assessment above, most appropriate setting for further diabetes education would be: Individual setting.      INTERVENTIONS:    Education provided today on:  AADE Self-Care Behaviors:  Diabetes Pathophysiology  Healthy Eating: carbohydrate counting, consistency in amount, composition, and timing of food intake and portion control  Being Active: relationship to blood glucose, describe appropriate activity program and precautions to take  Monitoring: log and interpret results, individual blood glucose targets and frequency of monitoring  Taking Medication: action of prescribed medication, side effects of prescribed medications and when to  take medications  Reducing Risks: prevention, early diagnostic measures and treatment of complications, A1C - goals, relating to blood glucose levels, how often to check and blood pressure and goals    Opportunities for ongoing education and support in diabetes-self management were discussed.    Pt verbalized understanding of concepts discussed and recommendations provided today.       Education Materials Provided:  No new materials provided today      ASSESSMENT:  Goals Addressed as of 2/10/2020                 Today    1/2/20      Exercise 3x per week (30 min per time)   On track  On track    Added 12/9/19 by Linda Lacey RN     My Goal: I will exercise 3 times a week    What I need to meet my goal: move exercise bike upstairs    I plan to meet my goal by this date: next appointment              Patient's most recent   Lab Results   Component Value Date    A1C 7.8 01/17/2020    is meeting goal of <8.0    PLAN  See Patient Instructions for co-developed, patient-stated behavior change goals.  Change Metformin to Metformin XR (extended release) : 2 - 500 mg tablets(1000 mg) twice a day    Call with questions or concerns: [phone]    Return to Diabetes Ed 3/10/20 at 8:00 am    AVS printed and provided to patient today. See Follow-Up section for recommended follow-up.      Time Spent: 30 minutes  Encounter Type: Individual    Any diabetes medication dose changes were made via the CDE Protocol and Collaborative Practice Agreement with the patient's primary care provider. A copy of this encounter was shared with the provider.

## 2020-02-10 NOTE — PROGRESS NOTES
"Chief Complaint   Patient presents with     Diabetes Education       Initial /70   Pulse 104   Resp 16   Ht 1.721 m (5' 7.75\")   Wt 79.9 kg (176 lb 3.2 oz)   SpO2 98%   BMI 26.99 kg/m   Estimated body mass index is 26.99 kg/m  as calculated from the following:    Height as of this encounter: 1.721 m (5' 7.75\").    Weight as of this encounter: 79.9 kg (176 lb 3.2 oz).  Medication Reconciliation: complete  Luisana Pederson LPN    "

## 2020-02-10 NOTE — PATIENT INSTRUCTIONS
Change Metformin to Metformin XR (extended release) : 2 - 500 mg tablets(1000 mg) twice a day    Call with questions or concerns: [phone]    Return to Diabetes Ed 3/10/20 at 8:00 am

## 2020-02-11 ENCOUNTER — TELEPHONE (OUTPATIENT)
Dept: EDUCATION SERVICES | Facility: OTHER | Age: 60
End: 2020-02-11

## 2020-02-11 DIAGNOSIS — E11.65 TYPE 2 DIABETES MELLITUS WITH HYPERGLYCEMIA, WITHOUT LONG-TERM CURRENT USE OF INSULIN (H): Primary | ICD-10-CM

## 2020-02-11 RX ORDER — METFORMIN HCL 500 MG
1000 TABLET, EXTENDED RELEASE 24 HR ORAL 2 TIMES DAILY WITH MEALS
Qty: 120 TABLET | Refills: 3 | Status: SHIPPED | OUTPATIENT
Start: 2020-02-11 | End: 2021-01-19

## 2020-02-11 NOTE — TELEPHONE ENCOUNTER
Pt called back and stated the script says 500 mg, two tablets with dinner.  He states he used to take 500 mg 2 tablets BID.  Is the new script correct?  Should he cut back to two tablets daily?    Please advise.  DAIN RIVAS LPN

## 2020-02-11 NOTE — TELEPHONE ENCOUNTER
Pt called stating he picked up his new prescription of Metformin and he was under the impression he was to take 2 tablets a day, which after reviewing his chart is correct to take 2 500 mg tablets at dinner.  He stated the new script says 1 tablet daily, I left a message for pt to call me back to clarify milligrams of medication and the script.  DAIN RIVAS, LATON[place]

## 2020-02-24 PROBLEM — E78.5 HYPERLIPIDEMIA ASSOCIATED WITH TYPE 2 DIABETES MELLITUS (H): Status: ACTIVE | Noted: 2020-01-17

## 2020-02-24 PROBLEM — E11.69 HYPERLIPIDEMIA ASSOCIATED WITH TYPE 2 DIABETES MELLITUS (H): Status: ACTIVE | Noted: 2020-01-17

## 2020-03-10 ENCOUNTER — HEALTH MAINTENANCE LETTER (OUTPATIENT)
Age: 60
End: 2020-03-10

## 2020-03-10 ENCOUNTER — HOSPITAL ENCOUNTER (OUTPATIENT)
Dept: EDUCATION SERVICES | Facility: HOSPITAL | Age: 60
Discharge: HOME | End: 2020-03-10
Attending: NURSE PRACTITIONER | Admitting: FAMILY MEDICINE
Payer: COMMERCIAL

## 2020-03-10 VITALS
OXYGEN SATURATION: 98 % | SYSTOLIC BLOOD PRESSURE: 106 MMHG | DIASTOLIC BLOOD PRESSURE: 63 MMHG | HEART RATE: 90 BPM | HEIGHT: 68 IN | RESPIRATION RATE: 16 BRPM | BODY MASS INDEX: 25.99 KG/M2 | WEIGHT: 171.5 LBS

## 2020-03-10 DIAGNOSIS — E11.65 TYPE 2 DIABETES MELLITUS WITH HYPERGLYCEMIA, WITHOUT LONG-TERM CURRENT USE OF INSULIN (H): Primary | ICD-10-CM

## 2020-03-10 PROCEDURE — G0108 DIAB MANAGE TRN  PER INDIV: HCPCS

## 2020-03-10 ASSESSMENT — MIFFLIN-ST. JEOR: SCORE: 1553.45

## 2020-03-10 ASSESSMENT — PAIN SCALES - GENERAL: PAINLEVEL: MILD PAIN (3)

## 2020-03-10 NOTE — PATIENT INSTRUCTIONS
Decrease Metformin XR to 2 - 500 mg pills every AM    I will call you in 2 weeks for BG readings    Concerns: call [phone]

## 2020-03-10 NOTE — PROGRESS NOTES
"Diabetes Self-Management Education & Support    Presents for: Follow-up(Follow-up)    SUBJECTIVE/OBJECTIVE:  Presents for: Follow-up(Follow-up)  Accompanied by: Self, Spouse  Diabetes education in the past 24mo: No  Focus of Visit: Healthy Eating, Monitoring, Taking Medication  Diabetes type: Type 2  Date of diagnosis: 11/2019  Disease course: Improving  Diabetes management related comments/concerns: Diarrhea keeping him up at night  Transportation concerns: No  Difficulty affording diabetes medication?: No  Difficulty affording diabetes testing supplies?: No  Other concerns:: Glasses  Cultural Influences/Ethnic Background:  American      Diabetes Symptoms & Complications:  Fatigue: No  Neuropathy: No  Polydipsia: No  Polyphagia: No  Polyuria: No  Visual change: No  Slow healing wounds: No  Symptom course: Improving  Complications assessed today?: Yes  Autonomic neuropathy: No  CVA: No  Heart disease: No  Nephropathy: No  Peripheral neuropathy: No  Foot ulcerations: No  Peripheral Vascular Disease: No  Retinopathy: No    Patient Problem List and Family Medical History reviewed for relevant medical history, current medical status, and diabetes risk factors.    Vitals:  /63   Pulse 90   Resp 16   Ht 1.721 m (5' 7.75\")   Wt 77.8 kg (171 lb 8 oz)   SpO2 98%   BMI 26.27 kg/m    Estimated body mass index is 26.27 kg/m  as calculated from the following:    Height as of this encounter: 1.721 m (5' 7.75\").    Weight as of this encounter: 77.8 kg (171 lb 8 oz).   Last 3 BP:   BP Readings from Last 3 Encounters:   03/10/20 106/63   02/10/20 108/70   01/02/20 118/73       History   Smoking Status     Former Smoker     Types: Cigarettes   Smokeless Tobacco     Never Used       Labs:  Lab Results   Component Value Date    A1C 7.8 01/17/2020     Lab Results   Component Value Date     11/07/2019     Lab Results   Component Value Date    LDL 70 11/18/2019     HDL Cholesterol   Date Value Ref Range Status "   11/18/2019 29 (L) 40 - 60 mg/dL Final   ]  GFR Estimate   Date Value Ref Range Status   11/07/2019 >90 >60 mL/min/[1.73_m2] Final     Comment:     Non  GFR Calc  Starting 12/18/2018, serum creatinine based estimated GFR (eGFR) will be   calculated using the Chronic Kidney Disease Epidemiology Collaboration   (CKD-EPI) equation.       GFR Estimate If Black   Date Value Ref Range Status   11/07/2019 >90 >60 mL/min/[1.73_m2] Final     Comment:      GFR Calc  Starting 12/18/2018, serum creatinine based estimated GFR (eGFR) will be   calculated using the Chronic Kidney Disease Epidemiology Collaboration   (CKD-EPI) equation.       Lab Results   Component Value Date    CR 0.70 11/07/2019     No results found for: MICROALBUMIN    Healthy Eating:  Healthy Eating Assessed Today: Yes  Cultural/Adventist diet restrictions?: No  Meal planning/habits: Carb counting  Meals include: Breakfast, Lunch, Dinner  Breakfast: plain oatmeal, peanut butter, fruit or cherios with soy milk  Lunch: tuna with trevizo and crackers, eggs/cheese/sausage, chicken nuggets with salad, hot dogs with mustard  Dinner: beef or chicken or fish with salad or vegetablesor hamburger/french fries or ground beef and macaroni or 2 tacos  Snacks: nuts, fruit, animal crackers  Beverages: Water, Tea, Coffee, Other, Milk(flavored water, soy milk)  Has patient met with a dietitian in the past?: No    Being Active:  Being Active Assessed Today: Yes  Exercise:: Yes  Days per week of moderate to strenuous exercise (like a brisk walk): 3  On average, minutes per day of exercise at this level: 20  How intense was your typical exercise? : Light (like stretching or slow walking)  Exercise Minutes per Week: 60  Barrier to exercise: None    Monitoring:  Monitoring Assessed Today: Yes  Did patient bring glucose meter to appointment? : Yes  Blood Glucose Meter: ContourNext  Times checking blood sugar at home (number): 2  Times checking blood  sugar at home (per): Day  Blood glucose trend: Decreasing        Taking Medications:  Diabetes Medication(s)     Biguanides       metFORMIN (GLUCOPHAGE-XR) 500 MG 24 hr tablet    Take 2 tablets (1,000 mg) by mouth 2 times daily (with meals)          Taking Medication Assessed Today: Yes  Problems taking diabetes medications regularly?: No  Diabetes medication side effects?: No    Problem Solving:  Problem Solving Assessed Today: Yes  Hypoglycemia Frequency: Never  Is the patient at risk for DKA?: No  Does patient have severe weather/disaster plan for diabetes management?: No  Does patient have sick day plan for diabetes management?: No              Reducing Risks:  Reducing Risks Assessed Today: No  Has dilated eye exam at least once a year?: Yes    Healthy Coping:  Healthy Coping Assessed Today: Yes  Emotional response to diabetes: Ready to learn  Informal Support system:: Family  Stage of change: ACTION (Actively working towards change)  Support resources: Websites  Patient Activation Measure Survey Score:  No flowsheet data found.    Diabetes knowledge and skills assessment:   Patient is knowledgeable in diabetes management concepts related to: Healthy Eating, Being Active, Monitoring and Taking Medication    Patient needs further education on the following diabetes management concepts: Monitoring and Taking Medication    Based on learning assessment above, most appropriate setting for further diabetes education would be: Individual setting.      INTERVENTIONS:    Education provided today on:  AADE Self-Care Behaviors:  Monitoring: log and interpret results and individual blood glucose targets  Taking Medication: action of prescribed medication, side effects of prescribed medications and when to take medications    Opportunities for ongoing education and support in diabetes-self management were discussed.    Pt verbalized understanding of concepts discussed and recommendations provided today.       Education  Materials Provided:  No new materials provided today      ASSESSMENT:  Continue to have diarrhea during night time. Feels that it is the statin as when he doesn't take it, diarrhea is less.  States has had diarrhea all along. Will decrease Metformin. PCP is out of town. Discussed follow-up with PCP    Doesn't sleep at night; sleeps during the day at times    Readings range as follows: fastin-149 and post-supper: 125-156    Patient's most recent   Lab Results   Component Value Date    A1C 7.8 2020    is not meeting goal of <7.0    PLAN  See Patient Instructions for co-developed, patient-stated behavior change goals.  Decrease Metformin XR to 2 - 500 mg pills every AM    I will call you in 2 weeks for BG readings    Concerns: call [phone]  AVS printed and provided to patient today. See Follow-Up section for recommended follow-up.      Time Spent: 30 minutes  Encounter Type: Individual    Any diabetes medication dose changes were made via the CDE Protocol and Collaborative Practice Agreement with the patient's primary care provider. A copy of this encounter was shared with the provider.

## 2020-03-25 ENCOUNTER — TELEPHONE (OUTPATIENT)
Dept: EDUCATION SERVICES | Facility: HOSPITAL | Age: 60
End: 2020-03-25

## 2020-03-25 NOTE — TELEPHONE ENCOUNTER
The pt calls in BG reading's:  on 03/10/20 BG was 140 after dinner  on 03/11/20 BG was  150 in the am 120 after dinner time,   on 03/12/20 BG was 134 in the am and 143 after dinner  on 03/13/20 BG was  115 in the am and 141 after dinner  on 03/14/20 BG was 159 in the am and 151 after dinner  on 03/15/20 BG was 136 in the am and 135 after dinner  on 03/16/20 BG was 131 in the am and 158 after dinner  on 03/17/20 BG was 111 in the am and 127 after dinner  on 03/18/20 BG was 147 in the am and 147 after dinner  on 03/19/20 BG was 141 in the am and 130 after dinner  on 03/20/20 BG was 133 in the am and 147 after dinner  on 03/21/20 BG was 142 in the am and 139 after dinner  on 03/22/20 BG was 139 in the am and 125 after dinner  on 03/23/20 BG was 134 in the am and 118 after dinner  on 03/24/20 BG was 126 in the am and 134 after dinner  on 03/25/20 BG was 144 in the am

## 2020-03-25 NOTE — LETTER
3/25/2020        RE: Junior Liucookiehaylee  [address] [place] [address]        The pt calls in BG reading's:  on 03/10/20 BG was 140 after dinner  on 03/11/20 BG was  150 in the am 120 after dinner time,   on 03/12/20 BG was 134 in the am and 143 after dinner  on 03/13/20 BG was  115 in the am and 141 after dinner  on 03/14/20 BG was 159 in the am and 151 after dinner  on 03/15/20 BG was 136 in the am and 135 after dinner  on 03/16/20 BG was 131 in the am and 158 after dinner  on 03/17/20 BG was 111 in the am and 127 after dinner  on 03/18/20 BG was 147 in the am and 147 after dinner  on 03/19/20 BG was 141 in the am and 130 after dinner  on 03/20/20 BG was 133 in the am and 147 after dinner  on 03/21/20 BG was 142 in the am and 139 after dinner  on 03/22/20 BG was 139 in the am and 125 after dinner  on 03/23/20 BG was 134 in the am and 118 after dinner  on 03/24/20 BG was 126 in the am and 134 after dinner  on 03/25/20 BG was 144 in the am    Called patient. Doing much better with decrease in Metformin dose. Much less diarrhea, more sleep. Will follow after next A1C. With change in Metformin dose, would like to see A1C again at 3 month julio. Will follow by phone.      Sincerely,        Jenny Morrison LPN

## 2020-03-25 NOTE — TELEPHONE ENCOUNTER
Called patient. Doing much better with decrease in Metformin dose. Much less diarrhea, more sleep. Will follow after next A1C. With change in Metformin dose, would like to see A1C again at 3 month julio. Will follow by phone.

## 2020-04-03 ENCOUNTER — HOSPITAL ENCOUNTER (EMERGENCY)
Facility: HOSPITAL | Age: 60
Discharge: HOME | End: 2020-04-03
Attending: NURSE PRACTITIONER | Admitting: NURSE PRACTITIONER
Payer: COMMERCIAL

## 2020-04-03 VITALS
RESPIRATION RATE: 18 BRPM | DIASTOLIC BLOOD PRESSURE: 62 MMHG | HEART RATE: 69 BPM | SYSTOLIC BLOOD PRESSURE: 100 MMHG | OXYGEN SATURATION: 98 % | TEMPERATURE: 97.4 F

## 2020-04-03 DIAGNOSIS — R00.2 PALPITATIONS: Primary | ICD-10-CM

## 2020-04-03 LAB
ALBUMIN SERPL-MCNC: 3.7 G/DL (ref 3.4–5)
ALP SERPL-CCNC: 65 U/L (ref 40–150)
ALT SERPL W P-5'-P-CCNC: 40 U/L (ref 0–70)
ANION GAP SERPL CALCULATED.3IONS-SCNC: 5 MMOL/L (ref 3–14)
AST SERPL W P-5'-P-CCNC: 14 U/L (ref 0–45)
BASOPHILS # BLD AUTO: 0.1 10E9/L (ref 0–0.2)
BASOPHILS NFR BLD AUTO: 0.8 %
BILIRUB SERPL-MCNC: 0.4 MG/DL (ref 0.2–1.3)
BUN SERPL-MCNC: 14 MG/DL (ref 7–30)
CALCIUM SERPL-MCNC: 8.7 MG/DL (ref 8.5–10.1)
CHLORIDE SERPL-SCNC: 108 MMOL/L (ref 94–109)
CO2 SERPL-SCNC: 26 MMOL/L (ref 20–32)
CREAT SERPL-MCNC: 0.76 MG/DL (ref 0.66–1.25)
DIFFERENTIAL METHOD BLD: ABNORMAL
EOSINOPHIL # BLD AUTO: 0.2 10E9/L (ref 0–0.7)
EOSINOPHIL NFR BLD AUTO: 2.4 %
ERYTHROCYTE [DISTWIDTH] IN BLOOD BY AUTOMATED COUNT: 13.7 % (ref 10–15)
GFR SERPL CREATININE-BSD FRML MDRD: >90 ML/MIN/{1.73_M2}
GLUCOSE SERPL-MCNC: 142 MG/DL (ref 70–99)
HCT VFR BLD AUTO: 37.6 % (ref 40–53)
HGB BLD-MCNC: 12.8 G/DL (ref 13.3–17.7)
IMM GRANULOCYTES # BLD: 0 10E9/L (ref 0–0.4)
IMM GRANULOCYTES NFR BLD: 0.3 %
LYMPHOCYTES # BLD AUTO: 1.7 10E9/L (ref 0.8–5.3)
LYMPHOCYTES NFR BLD AUTO: 27.6 %
MCH RBC QN AUTO: 28.9 PG (ref 26.5–33)
MCHC RBC AUTO-ENTMCNC: 34 G/DL (ref 31.5–36.5)
MCV RBC AUTO: 85 FL (ref 78–100)
MONOCYTES # BLD AUTO: 0.7 10E9/L (ref 0–1.3)
MONOCYTES NFR BLD AUTO: 11.7 %
NEUTROPHILS # BLD AUTO: 3.6 10E9/L (ref 1.6–8.3)
NEUTROPHILS NFR BLD AUTO: 57.2 %
NRBC # BLD AUTO: 0 10*3/UL
NRBC BLD AUTO-RTO: 0 /100
PLATELET # BLD AUTO: 299 10E9/L (ref 150–450)
POTASSIUM SERPL-SCNC: 3.9 MMOL/L (ref 3.4–5.3)
PROT SERPL-MCNC: 6.8 G/DL (ref 6.8–8.8)
RBC # BLD AUTO: 4.43 10E12/L (ref 4.4–5.9)
SODIUM SERPL-SCNC: 139 MMOL/L (ref 133–144)
TROPONIN I SERPL-MCNC: <0.015 UG/L (ref 0–0.04)
TSH SERPL DL<=0.005 MIU/L-ACNC: 1.19 MU/L (ref 0.4–4)
WBC # BLD AUTO: 6.3 10E9/L (ref 4–11)

## 2020-04-03 PROCEDURE — 85025 COMPLETE CBC W/AUTO DIFF WBC: CPT | Performed by: NURSE PRACTITIONER

## 2020-04-03 PROCEDURE — 93005 ELECTROCARDIOGRAM TRACING: CPT

## 2020-04-03 PROCEDURE — 99284 EMERGENCY DEPT VISIT MOD MDM: CPT | Mod: Z6 | Performed by: NURSE PRACTITIONER

## 2020-04-03 PROCEDURE — 84484 ASSAY OF TROPONIN QUANT: CPT | Performed by: NURSE PRACTITIONER

## 2020-04-03 PROCEDURE — 36415 COLL VENOUS BLD VENIPUNCTURE: CPT | Performed by: NURSE PRACTITIONER

## 2020-04-03 PROCEDURE — 99284 EMERGENCY DEPT VISIT MOD MDM: CPT

## 2020-04-03 PROCEDURE — 84443 ASSAY THYROID STIM HORMONE: CPT | Performed by: NURSE PRACTITIONER

## 2020-04-03 PROCEDURE — 80053 COMPREHEN METABOLIC PANEL: CPT | Performed by: NURSE PRACTITIONER

## 2020-04-03 RX ORDER — ERGOCALCIFEROL (VITAMIN D2) 10 MCG
800 TABLET ORAL DAILY
COMMUNITY

## 2020-04-03 ASSESSMENT — ENCOUNTER SYMPTOMS
RHINORRHEA: 0
SHORTNESS OF BREATH: 0
ABDOMINAL PAIN: 1
FEVER: 0
DYSURIA: 0
NAUSEA: 0
DIARRHEA: 0
VOMITING: 0
HEMATURIA: 0
SORE THROAT: 0
DIFFICULTY URINATING: 0
FATIGUE: 0
DIZZINESS: 0
CONSTIPATION: 0
WEAKNESS: 0
HEADACHES: 0
LIGHT-HEADEDNESS: 0
PALPITATIONS: 1
FREQUENCY: 0
COUGH: 0
CHILLS: 0

## 2020-04-03 NOTE — ED PROVIDER NOTES
History     Chief Complaint   Patient presents with     Palpitations     HPI     Junior Castellanos is a 59 year old male who presents ambulatory with concerns of about 30 seconds of palpitations around 1430 today while sitting in a chair reading some newspaper articles. He wears a smart watch and it showed that his HR was in the 170s. He denies chest pain, shortness of breath, exertional chest pain, dyspnea on exertion, LE edema, headaches, light headedness, dizziness, appetite changes, fever, chills. Has some epigastric discomfort, denies n/v/d, hematochezia, melena.   His hands and feet did feel cold after episode of palpitations. No history of palpitations.     He has a history of type II DM diagnosed in November 2019. Reports good control of blood glucose levels - checks blood glucose twice daily. Usual fasting sugar is in the 140s, this morning was 125. Recently had decreased dosing of metformin due to GI side effects.   Hyperlipidemia - has not taken atorvastatin in the last 2 weeks because he was wondering if this could be contributing to upset stomach.   JAGUAR- compliant with CPAP  No history of CAD, COPD, asthma.     Former smoker - 20 year history. Quit smoking in 1995.       Allergies:  No Known Allergies    Problem List:    Patient Active Problem List    Diagnosis Date Noted     Hyperlipidemia associated with type 2 diabetes mellitus (H) 01/17/2020     Priority: Medium     Type 2 diabetes mellitus without complication, without long-term current use of insulin (H) 11/18/2019     Priority: Medium     Uncontrolled         JAGUAR on CPAP      Priority: Medium        Past Medical History:    Past Medical History:   Diagnosis Date     JAGUAR on CPAP        Past Surgical History:    Past Surgical History:   Procedure Laterality Date     COLONOSCOPY N/A 5/20/2016    Procedure: COLONOSCOPY;  Surgeon: Mayo Felix DO;  Location: [facility]     UPPER GI ENDOSCOPY         Family History:    No family history on  file.    Social History:  Marital Status:   [2]  Social History     Tobacco Use     Smoking status: Former Smoker     Types: Cigarettes     Smokeless tobacco: Never Used   Substance Use Topics     Alcohol use: No     Alcohol/week: 0.0 standard drinks     Drug use: No        Medications:    aspirin 81 MG EC tablet  atorvastatin (LIPITOR) 10 MG tablet  metFORMIN (GLUCOPHAGE-XR) 500 MG 24 hr tablet  omeprazole (PRILOSEC) 40 MG DR capsule  UNABLE TO FIND  Vitamin D, Cholecalciferol, 10 MCG (400 UNIT) TABS  blood glucose (CONTOUR NEXT TEST) test strip  blood glucose (NO BRAND SPECIFIED) lancets standard          Review of Systems   Constitutional: Negative for chills, fatigue and fever.   HENT: Negative for congestion, ear pain, rhinorrhea and sore throat.    Eyes: Negative for visual disturbance.   Respiratory: Negative for cough and shortness of breath.    Cardiovascular: Positive for palpitations. Negative for chest pain and leg swelling.   Gastrointestinal: Positive for abdominal pain (epigastric pain). Negative for constipation, diarrhea, nausea and vomiting.   Genitourinary: Negative for difficulty urinating, dysuria, frequency and hematuria.   Skin: Negative for rash.   Neurological: Negative for dizziness, syncope, weakness, light-headedness and headaches.       Physical Exam   BP: 113/73  Pulse: 72  Heart Rate: 66  Temp: 97.4  F (36.3  C)  Resp: 16  SpO2: 99 %      Physical Exam  Constitutional:       General: He is not in acute distress.     Appearance: Normal appearance. He is not ill-appearing, toxic-appearing or diaphoretic.   Cardiovascular:      Rate and Rhythm: Normal rate and regular rhythm.      Pulses:           Posterior tibial pulses are 2+ on the right side and 2+ on the left side.      Heart sounds: S1 normal and S2 normal. No murmur. No friction rub. No gallop.    Pulmonary:      Effort: Pulmonary effort is normal. No tachypnea or respiratory distress.      Breath sounds: Normal breath  sounds. No wheezing, rhonchi or rales.   Abdominal:      General: Bowel sounds are normal. There is no distension.      Palpations: Abdomen is soft.      Tenderness: There is no abdominal tenderness. There is no guarding or rebound.   Musculoskeletal:      Right lower leg: No edema.      Left lower leg: No edema.   Skin:     General: Skin is warm and dry.      Capillary Refill: Capillary refill takes less than 2 seconds.      Coloration: Skin is not pale.      Findings: No rash.   Neurological:      Mental Status: He is alert and oriented to person, place, and time.      Motor: No weakness.      Gait: Gait is intact.   Psychiatric:         Mood and Affect: Mood normal.         Speech: Speech normal.         Behavior: Behavior normal. Behavior is cooperative.         ED Course        Procedures               EKG Interpretation:      Interpreted by Caty Muñoz CNP  Time reviewed: 1920  Symptoms at time of EKG: palpitations   Rhythm: normal sinus   Rate: normal  Axis: normal  Ectopy: none  Conduction: normal  ST Segments/ T Waves: No ST-T wave changes  Q Waves: none  Comparison to prior: Unchanged from 11/7/2019    Clinical Impression: normal EKG        Results for orders placed or performed during the hospital encounter of 04/03/20 (from the past 24 hour(s))   CBC with platelets differential   Result Value Ref Range    WBC 6.3 4.0 - 11.0 10e9/L    RBC Count 4.43 4.4 - 5.9 10e12/L    Hemoglobin 12.8 (L) 13.3 - 17.7 g/dL    Hematocrit 37.6 (L) 40.0 - 53.0 %    MCV 85 78 - 100 fl    MCH 28.9 26.5 - 33.0 pg    MCHC 34.0 31.5 - 36.5 g/dL    RDW 13.7 10.0 - 15.0 %    Platelet Count 299 150 - 450 10e9/L    Diff Method Automated Method     % Neutrophils 57.2 %    % Lymphocytes 27.6 %    % Monocytes 11.7 %    % Eosinophils 2.4 %    % Basophils 0.8 %    % Immature Granulocytes 0.3 %    Nucleated RBCs 0 0 /100    Absolute Neutrophil 3.6 1.6 - 8.3 10e9/L    Absolute Lymphocytes 1.7 0.8 - 5.3 10e9/L    Absolute Monocytes 0.7  0.0 - 1.3 10e9/L    Absolute Eosinophils 0.2 0.0 - 0.7 10e9/L    Absolute Basophils 0.1 0.0 - 0.2 10e9/L    Abs Immature Granulocytes 0.0 0 - 0.4 10e9/L    Absolute Nucleated RBC 0.0    Comprehensive metabolic panel   Result Value Ref Range    Sodium 139 133 - 144 mmol/L    Potassium 3.9 3.4 - 5.3 mmol/L    Chloride 108 94 - 109 mmol/L    Carbon Dioxide 26 20 - 32 mmol/L    Anion Gap 5 3 - 14 mmol/L    Glucose 142 (H) 70 - 99 mg/dL    Urea Nitrogen 14 7 - 30 mg/dL    Creatinine 0.76 0.66 - 1.25 mg/dL    GFR Estimate >90 >60 mL/min/[1.73_m2]    GFR Estimate If Black >90 >60 mL/min/[1.73_m2]    Calcium 8.7 8.5 - 10.1 mg/dL    Bilirubin Total 0.4 0.2 - 1.3 mg/dL    Albumin 3.7 3.4 - 5.0 g/dL    Protein Total 6.8 6.8 - 8.8 g/dL    Alkaline Phosphatase 65 40 - 150 U/L    ALT 40 0 - 70 U/L    AST 14 0 - 45 U/L   Troponin I   Result Value Ref Range    Troponin I ES <0.015 0.000 - 0.045 ug/L   TSH with free T4 reflex   Result Value Ref Range    TSH 1.19 0.40 - 4.00 mU/L       Medications - No data to display    Assessments & Plan (with Medical Decision Making)     I have reviewed the nursing notes.    I have reviewed the findings, diagnosis, plan and need for follow up with the patient.  (R00.2) Palpitations  (primary encounter diagnosis)  59-year old male presents ambulatory from home with concerns of a 30 second episode of palpitations while sitting and reading a newspaper article. Per his smart watch his heart rate was in the 170s. He has no cardiac past medical history. Review of systems overall negative - denies chest pain, exertional chest pain, SOB, dyspnea on exertion, pre-syncope, syncope, weakness, headache, light headedness. His EKG is normal and bedside monitor has shown sinus rhythm with HR in the 60s-80s while in ED. Troponin is negative. No acute leukocytosis. Mild anemia with hemoglobin 12.8/hematocrit 37.6, denies hematochezia, melena. No acute electrolyte, renal, or hepatic abnormalities. Thyroid  functioning is normal. He reports he has had good glucose control since diagnoses of DM type II in November 2019. He does not feel dehydrated and has not had changes in oral intake. He is normotensive, oxygen saturation is 99% on room air. No emergent findings for episode of palpitations today. Recommend keeping track if this happens again, what you are doing when this happens, and if you have symptoms of weakness, light headedness, chest pain, shortness of breath present back to the ED. Otherwise, recommend following up with your primary care provider. Consider further evaluation with zio patch.        RETURN TO THE ED WITH NEW OR WORSENING SYMPTOMS.    Caty Muñoz CNP          New Prescriptions    No medications on file       Final diagnoses:   Palpitations       4/3/2020   [facility]     Caty Muñoz CNP  04/03/20 2013

## 2020-04-03 NOTE — ED AVS SNAPSHOT
[facility]  [address] [address]  Phone:  [phone]                                    Junior Castellanos   MRN: [id number]    Department:  [facility]   Date of Visit:  4/3/2020           After Visit Summary Signature Page    I have received my discharge instructions, and my questions have been answered. I have discussed any challenges I see with this plan with the nurse or doctor.    ..........................................................................................................................................  Patient/Patient Representative Signature      ..........................................................................................................................................  Patient Representative Print Name and Relationship to Patient    ..................................................               ................................................  Date                                   Time    ..........................................................................................................................................  Reviewed by Signature/Title    ...................................................              ..............................................  Date                                               Time          22EPIC Rev 08/18

## 2020-04-04 NOTE — ED NOTES
Encouraged pt to return to the ED with any new or worsening sx and to follow up with PCP next week. Pt agreeable to plan of care.

## 2020-04-04 NOTE — ED NOTES
Pt states he had a 20 minute episode of palpitations at 1430. Noted upper abd discomfort on the right, now across epigastric area at 1-2/10. Pt states he had a similar episode last November and he was diagnosed with diabetes.

## 2020-04-04 NOTE — DISCHARGE INSTRUCTIONS
(R00.2) Palpitations  (primary encounter diagnosis)  59-year old male presents ambulatory from home with concerns of a 30 second episode of palpitations while sitting and reading a newspaper article. Per his smart watch his heart rate was in the 170s. He has no cardiac past medical history. Review of systems overall negative - denies chest pain, exertional chest pain, SOB, dyspnea on exertion, pre-syncope, syncope, weakness, headache, light headedness. His EKG is normal and bedside monitor has shown sinus rhythm with HR in the 60s-80s while in ED. Troponin is negative. No acute leukocytosis. Mild anemia with hemoglobin 12.8/hematocrit 37.6, denies hematochezia, melena. No acute electrolyte, renal, or hepatic abnormalities. Thyroid functioning is normal. He reports he has had good glucose control since diagnoses of DM type II in November 2019. He does not feel dehydrated and has not had changes in oral intake. No emergent findings for episode of palpitations today. Recommend keeping track if this happens again, what you are doing when this happens, and if you have symptoms of weakness, light headedness, chest pain, shortness of breath present back to the ED. Otherwise, recommend following up with your primary care provider. Consider further evaluation with zio patch.        RETURN TO THE ED WITH NEW OR WORSENING SYMPTOMS.    Caty Muñoz, CNP

## 2020-04-22 ENCOUNTER — TELEPHONE (OUTPATIENT)
Dept: CARDIOLOGY | Facility: OTHER | Age: 60
End: 2020-04-22

## 2020-04-22 NOTE — TELEPHONE ENCOUNTER
The pt calls in BG reading's:  on 04/22/20 BG was 138 in the am  on 04/21/20 BG was 116 in the am and 190 after dinner  on 04/20/20 BG was 119 in the am and 107 after dinner  on 04/19/20 BG was 139 in the am and 127 after dinner  on 04/18/20 BG was 153 in the am and 159 after dinner  on 04/17/20 BG was 131 in the am and 152 after dinner  on 04/16/20 BG was 148 in the am and 166 after dinner  on 04/15/20 BG was 137 in the am and 147 after dinner  on 04/14/20 BG was 130 in the am and 134 after dinner  on 04/13/20 BG was 131 in the am and 146 after dinner  on 04/12/20 BG was 139 in the am and 142 after dinner  on 04/11/20 BG was 135 in the am and 130 after dinner  on 04/10/20 BG was 141 in the am and 123 after dinner  on 04/09/20 BG was 154 in the am and 132 after dinner

## 2020-12-01 DIAGNOSIS — E11.9 NEW ONSET TYPE 2 DIABETES MELLITUS (H): ICD-10-CM

## 2020-12-01 RX ORDER — LANCETS
EACH MISCELLANEOUS
Qty: 100 EACH | Refills: 0 | Status: ON HOLD | OUTPATIENT
Start: 2020-12-01 | End: 2021-11-16

## 2020-12-01 NOTE — TELEPHONE ENCOUNTER
CONTOUR NEXT TEST test strip    Last Written Prescription Date:  11/25/19  Last Fill Quantity: 100each,   # refills: 10  Last Office Visit: 1/17/20  Future Office visit:       Routing refill request to provider for review/approval because:    blood glucose (NO BRAND SPECIFIED) lancets standard      Last Written Prescription Date:  11/26/19  Last Fill Quantity: 100each,   # refills: 10  Last Office Visit: 1/17/20  Future Office visit:       Routing refill request to provider for review/approval because:

## 2020-12-01 NOTE — TELEPHONE ENCOUNTER
Needs an appt with Linda LINN RN CDE for refills.     Reva CORBIN FNP-BC  Diabetes and Wound Care

## 2020-12-26 DIAGNOSIS — E11.9 NEW ONSET TYPE 2 DIABETES MELLITUS (H): ICD-10-CM

## 2020-12-27 ENCOUNTER — HEALTH MAINTENANCE LETTER (OUTPATIENT)
Age: 60
End: 2020-12-27

## 2020-12-28 NOTE — TELEPHONE ENCOUNTER
CONTOUR NEXT TEST test strip  Last Written Prescription Date: 12/1/20  Last Fill Quantity: 50 # of Refills: 0  Last Office Visit: 3/10/20

## 2021-01-14 DIAGNOSIS — E11.9 TYPE 2 DIABETES MELLITUS WITHOUT COMPLICATION, WITHOUT LONG-TERM CURRENT USE OF INSULIN (H): Primary | ICD-10-CM

## 2021-01-19 ENCOUNTER — HOSPITAL ENCOUNTER (OUTPATIENT)
Dept: EDUCATION SERVICES | Facility: HOSPITAL | Age: 61
Discharge: HOME | End: 2021-01-19
Attending: NURSE PRACTITIONER | Admitting: FAMILY MEDICINE
Payer: COMMERCIAL

## 2021-01-19 VITALS
DIASTOLIC BLOOD PRESSURE: 69 MMHG | HEIGHT: 68 IN | BODY MASS INDEX: 28.89 KG/M2 | WEIGHT: 190.6 LBS | HEART RATE: 78 BPM | SYSTOLIC BLOOD PRESSURE: 107 MMHG | OXYGEN SATURATION: 97 % | RESPIRATION RATE: 16 BRPM

## 2021-01-19 DIAGNOSIS — E11.65 TYPE 2 DIABETES MELLITUS WITH HYPERGLYCEMIA, WITHOUT LONG-TERM CURRENT USE OF INSULIN (H): ICD-10-CM

## 2021-01-19 DIAGNOSIS — E11.9 NEW ONSET TYPE 2 DIABETES MELLITUS (H): ICD-10-CM

## 2021-01-19 PROCEDURE — G0108 DIAB MANAGE TRN  PER INDIV: HCPCS

## 2021-01-19 RX ORDER — METFORMIN HCL 500 MG
500 TABLET, EXTENDED RELEASE 24 HR ORAL DAILY
Qty: 30 TABLET | Refills: 6 | Status: SHIPPED | OUTPATIENT
Start: 2021-01-19 | End: 2021-11-16

## 2021-01-19 RX ORDER — LANCETS
EACH MISCELLANEOUS
Qty: 100 EACH | Refills: 11 | Status: SHIPPED | OUTPATIENT
Start: 2021-01-19 | End: 2022-04-05

## 2021-01-19 ASSESSMENT — ANXIETY QUESTIONNAIRES
3. WORRYING TOO MUCH ABOUT DIFFERENT THINGS: NOT AT ALL
7. FEELING AFRAID AS IF SOMETHING AWFUL MIGHT HAPPEN: NOT AT ALL
2. NOT BEING ABLE TO STOP OR CONTROL WORRYING: NOT AT ALL
1. FEELING NERVOUS, ANXIOUS, OR ON EDGE: NOT AT ALL
GAD7 TOTAL SCORE: 0
4. TROUBLE RELAXING: NOT AT ALL
6. BECOMING EASILY ANNOYED OR IRRITABLE: NOT AT ALL
5. BEING SO RESTLESS THAT IT IS HARD TO SIT STILL: NOT AT ALL
IF YOU CHECKED OFF ANY PROBLEMS ON THIS QUESTIONNAIRE, HOW DIFFICULT HAVE THESE PROBLEMS MADE IT FOR YOU TO DO YOUR WORK, TAKE CARE OF THINGS AT HOME, OR GET ALONG WITH OTHER PEOPLE: NOT DIFFICULT AT ALL

## 2021-01-19 ASSESSMENT — PAIN SCALES - GENERAL: PAINLEVEL: NO PAIN (0)

## 2021-01-19 ASSESSMENT — MIFFLIN-ST. JEOR: SCORE: 1639.06

## 2021-01-19 ASSESSMENT — PATIENT HEALTH QUESTIONNAIRE - PHQ9: SUM OF ALL RESPONSES TO PHQ QUESTIONS 1-9: 2

## 2021-01-20 ASSESSMENT — ANXIETY QUESTIONNAIRES: GAD7 TOTAL SCORE: 0

## 2021-01-22 NOTE — PROGRESS NOTES
"Diabetes Self-Management Education & Support    Presents for: Follow-up    SUBJECTIVE/OBJECTIVE:  Presents for: Follow-up  Accompanied by: Self  Diabetes education in the past 24mo: Yes  Focus of Visit: Monitoring  Diabetes type: Type 2  Disease course: Stable  Diabetes management related comments/concerns: None - needs refills  Transportation concerns: No  Difficulty affording diabetes medication?: No  Difficulty affording diabetes testing supplies?: No  Other concerns:: None  Cultural Influences/Ethnic Background:  American    Diabetes Symptoms & Complications:  Fatigue: Sometimes  Neuropathy: Sometimes  Polydipsia: No  Polyphagia: No  Polyuria: No  Visual change: No  Slow healing wounds: No  Symptom course: Stable  Weight trend: Stable  Complications assessed today?: Yes  Autonomic neuropathy: No  CVA: No  Heart disease: No  Nephropathy: No  Peripheral neuropathy: No  Foot ulcerations: No  Peripheral Vascular Disease: No  Retinopathy: No  Sexual dysfunction: No    Patient Problem List and Family Medical History reviewed for relevant medical history, current medical status, and diabetes risk factors.    Vitals:  /69   Pulse 78   Resp 16   Ht 1.727 m (5' 8\")   Wt 86.5 kg (190 lb 9.6 oz)   SpO2 97%   BMI 28.98 kg/m    Estimated body mass index is 28.98 kg/m  as calculated from the following:    Height as of this encounter: 1.727 m (5' 8\").    Weight as of this encounter: 86.5 kg (190 lb 9.6 oz).   Last 3 BP:   BP Readings from Last 3 Encounters:   01/19/21 107/69   04/03/20 100/62   03/10/20 106/63       History   Smoking Status     Former Smoker     Types: Cigarettes   Smokeless Tobacco     Never Used       Labs:  Lab Results   Component Value Date    A1C 7.8 01/17/2020     Lab Results   Component Value Date     04/03/2020     Lab Results   Component Value Date    LDL 70 11/18/2019     HDL Cholesterol   Date Value Ref Range Status   11/18/2019 29 (L) 40 - 60 mg/dL Final   ]  GFR Estimate   Date " Value Ref Range Status   04/03/2020 >90 >60 mL/min/[1.73_m2] Final     Comment:     Non  GFR Calc  Starting 12/18/2018, serum creatinine based estimated GFR (eGFR) will be   calculated using the Chronic Kidney Disease Epidemiology Collaboration   (CKD-EPI) equation.       GFR Estimate If Black   Date Value Ref Range Status   04/03/2020 >90 >60 mL/min/[1.73_m2] Final     Comment:      GFR Calc  Starting 12/18/2018, serum creatinine based estimated GFR (eGFR) will be   calculated using the Chronic Kidney Disease Epidemiology Collaboration   (CKD-EPI) equation.       Lab Results   Component Value Date    CR 0.76 04/03/2020     No results found for: MICROALBUMIN    Healthy Eating:  Healthy Eating Assessed Today: Yes  Cultural/Jain diet restrictions?: No  Meal planning/habits: Avoiding sweets, Carb counting, Low carb, Keeps food records  How many times a week on average do you eat food made away from home (restaurant/take-out)?: 0  Meals include: Breakfast, Lunch, Dinner, Evening Snack  Breakfast: eggs, oatmeal  Lunch: caeser salad  Dinner: tacos, meat with salad or vegetables  Snacks: peanuts  Beverages: Water, Coffee    Being Active:  Being Active Assessed Today: Yes  Exercise:: Currently not exercising(recently moved and will be setting up equipment)  Days per week of moderate to strenuous exercise (like a brisk walk): 3  On average, minutes per day of exercise at this level: 30  How intense was your typical exercise? : Moderate (like brisk walking)  Exercise Minutes per Week: 90  Barrier to exercise: None, Access    Monitoring:  Monitoring Assessed Today: Yes  Did patient bring glucose meter to appointment? : Yes  Blood Glucose Meter: ContourNext  Times checking blood sugar at home (number): 2  Times checking blood sugar at home (per): Day  Blood glucose trend: No change        Taking Medications:  Diabetes Medication(s)     Biguanides       metFORMIN (GLUCOPHAGE-XR) 500 MG 24 hr  tablet    Take 1 tablet (500 mg) by mouth daily          Taking Medication Assessed Today: Yes  Current Treatments: Diet, Oral Medication (taken by mouth)  Problems taking diabetes medications regularly?: No  Diabetes medication side effects?: No    Problem Solving:  Problem Solving Assessed Today: Yes  Is the patient at risk for hypoglycemia?: No              Reducing Risks:  Reducing Risks Assessed Today: Yes  CAD Risks: Diabetes Mellitus, Male sex, Sedentary lifestyle  Has dilated eye exam at least once a year?: No(due)  Sees dentist every 6 months?: No  Feet checked by healthcare provider in the last year?: Yes    Healthy Coping:  Emotional response to diabetes: Confidence diabetes can be controlled  Informal Support system:: Family, Spouse  Stage of change: ACTION (Actively working towards change)  Support resources: Websites  Patient Activation Measure Survey Score:  No flowsheet data found.    Diabetes knowledge and skills assessment:   Patient is knowledgeable in diabetes management concepts related to: Healthy Eating, Being Active, Monitoring and Taking Medication    Patient needs further education on the following diabetes management concepts: Monitoring, Taking Medication and Reducing Risks    Based on learning assessment above, most appropriate setting for further diabetes education would be: Individual setting.      INTERVENTIONS:    Education provided today on:  AADE Self-Care Behaviors:  Diabetes Pathophysiology  Healthy Eating: consistency in amount, composition, and timing of food intake and portion control  Being Active: relationship to blood glucose and describe appropriate activity program  Monitoring: log and interpret results, individual blood glucose targets and frequency of monitoring  Taking Medication: action of prescribed medication, side effects of prescribed medications and when to take medications  Reducing Risks: major complications of diabetes, prevention, early diagnostic measures  and treatment of complications, foot care, annual eye exam and A1C - goals, relating to blood glucose levels, how often to check    Opportunities for ongoing education and support in diabetes-self management were discussed.    Pt verbalized understanding of concepts discussed and recommendations provided today.       Education Materials Provided:  No new materials provided today      ASSESSMENT:  Readings range as follows: fastin-158 and post-supper: 101-152    Next A1C will be done next month when Junior sees Dr. Adelita Pacheco is doing well with his diabetes management.    Patient's most recent A1C: 2020 6.2    PLAN  See Patient Instructions for co-developed, patient-stated behavior change goals.  Continue with current plan    Call with questions/concerns: [phone]    Follow up annually    AVS printed and provided to patient today. See Follow-Up section for recommended follow-up.      Time Spent: 30 minutes  Encounter Type: Individual    Any diabetes medication dose changes were made via the CDE Protocol and Collaborative Practice Agreement with the patient's primary care provider. A copy of this encounter was shared with the provider.

## 2021-03-05 DIAGNOSIS — E11.9 NEW ONSET TYPE 2 DIABETES MELLITUS (H): ICD-10-CM

## 2021-03-05 DIAGNOSIS — E11.65 TYPE 2 DIABETES MELLITUS WITH HYPERGLYCEMIA, WITHOUT LONG-TERM CURRENT USE OF INSULIN (H): ICD-10-CM

## 2021-03-05 NOTE — TELEPHONE ENCOUNTER
Contour next test strips      Last Written Prescription Date:  1/19/21  Last Fill Quantity: 100,   # refills: 0  Last Office Visit: ?????  Future Office visit:       Routing refill request to provider for review/approval because:

## 2021-04-24 ENCOUNTER — HEALTH MAINTENANCE LETTER (OUTPATIENT)
Age: 61
End: 2021-04-24

## 2021-08-08 ENCOUNTER — HEALTH MAINTENANCE LETTER (OUTPATIENT)
Age: 61
End: 2021-08-08

## 2021-10-03 ENCOUNTER — HEALTH MAINTENANCE LETTER (OUTPATIENT)
Age: 61
End: 2021-10-03

## 2021-11-08 ENCOUNTER — NURSE TRIAGE (OUTPATIENT)
Dept: FAMILY MEDICINE | Facility: OTHER | Age: 61
End: 2021-11-08
Payer: COMMERCIAL

## 2021-11-08 DIAGNOSIS — Z20.822 EXPOSURE TO 2019 NOVEL CORONAVIRUS: ICD-10-CM

## 2021-11-08 DIAGNOSIS — Z20.822 SUSPECTED 2019 NOVEL CORONAVIRUS INFECTION: Primary | ICD-10-CM

## 2021-11-08 NOTE — TELEPHONE ENCOUNTER
"Discharge Instructions for COVID-19 Patients  You have--or may have--COVID-19. Please follow the instructions listed below.   If you have a weakened immune system, discuss with your doctor any other actions you need to take.  How can I protect others?  If you have symptoms (fever, cough, body aches or trouble breathing):    Stay home and away from others (self-isolate) until:  ? Your other symptoms have resolved (gotten better). And   ? You've had no fever--and no medicine that reduces fever--for 1 full day (24 hours). And   ? At least 10 days have passed since your symptoms started. (You may need to wait 20 days. Follow the advice of your care team.)  If you don't show symptoms, but testing showed that you have COVID-19:    Stay home and away from others (self-isolate) until at least 10 days have passed since the date of your first positive COVID-19 test.  During this time    Stay in your own room, even for meals. Use your own bathroom if you can.    Stay away from others in your home. No hugging, kissing or shaking hands. No visitors.    Don't go to work, school or anywhere else.    Clean \"high touch\" surfaces often (doorknobs, counters, handles). Use household cleaning spray or wipes.    You'll find a full list of  on the EPA website: www.epa.gov/pesticide-registration/list-n-disinfectants-use-against-sars-cov-2.    Cover your mouth and nose with a mask or other face covering to avoid spreading germs.    Wash your hands and face often. Use soap and water.    Caregivers in these groups are at risk for severe illness due to COVID-19:  ? People 65 years and older  ? People who live in a nursing home or long-term care facility  ? People with chronic disease (lung, heart, cancer, diabetes, kidney, liver, immunologic)  ? People who have a weakened immune system, including those who:    Are in cancer treatment    Take medicine that weakens the immune system, such as corticosteroids    Had a bone marrow or organ " transplant    Have an immune deficiency    Have poorly controlled HIV or AIDS    Are obese (body mass index of 40 or higher)    Smoke regularly    Caregivers should wear gloves while washing dishes, handling laundry and cleaning bedrooms and bathrooms.    Use caution when washing and drying laundry: Don't shake dirty laundry and use the warmest water setting that you can.    For more tips on managing your health at home, go to www.cdc.gov/coronavirus/2019-ncov/downloads/10Things.pdf.  How can I take care of myself at home?  1. Get lots of rest. Drink extra fluids (unless a doctor has told you not to).  2. Take Tylenol (acetaminophen) for fever or pain. If you have liver or kidney problems, ask your family doctor if it's okay to take Tylenol.   Adults can take either:   ? 650 mg (two 325 mg pills) every 4 to 6 hours, or   ? 1,000 mg (two 500 mg pills) every 8 hours as needed.  ? Note: Don't take more than 3,000 mg in one day. Acetaminophen is found in many medicines (both prescribed and over-the-counter medicines). Read all labels to be sure you don't take too much.   For children, check the Tylenol bottle for the right dose. The dose is based on the child's age or weight.  3. If you have other health problems (like cancer, heart failure, an organ transplant or severe kidney disease): Call your specialty clinic if you don't feel better in the next 2 days.  4. Know when to call 911. Emergency warning signs include:  ? Trouble breathing or shortness of breath  ? Pain or pressure in the chest that doesn't go away  ? Feeling confused like you haven't felt before, or not being able to wake up  ? Bluish-colored lips or face  5. Your doctor may have prescribed a blood thinner medicine. Follow their instructions.  Where can I get more information?     [facility] - About COVID-19:   [email or web address]    CDC - What to Do If You're Sick:  www.cdc.gov/coronavirus/2019-ncov/about/steps-when-sick.html    CDC - Ending Home Isolation: www.cdc.gov/coronavirus/2019-ncov/hcp/disposition-in-home-patients.html    CDC - Caring for Someone: www.cdc.gov/coronavirus/2019-ncov/if-you-are-sick/care-for-someone.html    [facility] - Interim Guidance for Hospital Discharge to Home: www.health.[facility].[place]./diseases/coronavirus/hcp/hospdischarge.pdf    Below are the COVID-19 hotlines at the [place] Department of Health ([facility]). Interpreters are available.  ? For health questions: Call 675-623-1907 or 1-570.841.3444 (7 a.m. to 7 p.m.)  ? For questions about schools and childcare: Call 156-374-9896 or 1-933.736.3209 (7 a.m. to 7 p.m.)    For informational purposes only. Not to replace the advice of your health care provider. Clinically reviewed by Dr. Morgan Shetty.   Copyright   2020 [facility]. All rights reserved. [employer] 004525 - REV 01/05/21.      Reason for Disposition    [1] CLOSE CONTACT COVID-19 EXPOSURE within last 14 days AND [2] NO symptoms    Additional Information    Negative: COVID-19 lab test positive    Negative: [1] Lives with someone known to have influenza (flu test positive) AND [2] flu-like symptoms (e.g., cough, runny nose, sore throat, SOB; with or without fever)    Negative: [1] Symptoms of COVID-19 (e.g., cough, fever, SOB, or others) AND [2] HCP diagnosed COVID-19 based on symptoms    Negative: [1] Symptoms of COVID-19 (e.g., cough, fever, SOB, or others) AND [2] lives in an area with community spread    Negative: [1] Symptoms of COVID-19 (e.g., cough, fever, SOB, or others) AND [2] within 14 days of EXPOSURE (close contact) with diagnosed or suspected COVID-19 patient    Negative: [1] Symptoms of COVID-19 (e.g., cough, fever, SOB, or others) AND [2] within 14 days of travel from high-risk area for COVID-19 community spread (identified by CDC)    Negative: [1] Difficulty breathing (shortness of breath) occurs AND [2] onset > 14 days after  "COVID-19 EXPOSURE (Close Contact)    Negative: [1] Dry cough occurs AND [2] onset > 14 days after COVID-19 EXPOSURE    Negative: [1] Wet cough (i.e., white-yellow, yellow, green, or bradley colored sputum) AND [2] onset > 14 days after COVID-19 EXPOSURE    Negative: [1] Common cold symptoms AND [2] onset > 14 days after COVID-19 EXPOSURE    Negative: COVID-19 vaccine reaction suspected (e.g., fever, headache, muscle aches) occurring during days 1-3 after getting vaccine    Negative: COVID-19 vaccine, questions about    Negative: [1] CLOSE CONTACT COVID-19 EXPOSURE within last 14 days AND [2] needs COVID-19 lab test to return to work AND [3] NO symptoms    Negative: [1] CLOSE CONTACT COVID-19 EXPOSURE within last 14 days AND [2] exposed person is a  (e.g., police or paramedic) AND [3] NO symptoms    Negative: [1] CLOSE CONTACT COVID-19 EXPOSURE within last 14 days AND [2] exposed person is a healthcare worker who was NOT using all recommended personal protective equipment (e.g., a respirator-N95 mask, eye protection, gloves, and gown) AND [3] NO symptoms    Negative: [1] Living or working in a correctional facility, long-term care facility, or shelter (i.e., congregate setting; densely populated) AND [2] where an outbreak has occurred AND [3] NO symptoms    Answer Assessment - Initial Assessment Questions  1. COVID-19 EXPOSURE: \"Please describe how you were exposed to someone with a COVID-19 infection.\"      daughter  2. PLACE of CONTACT: \"Where were you when you were exposed to COVID-19?\" (e.g., home, school, medical waiting room; which city?)      home  3. TYPE of CONTACT: \"How much contact was there?\" (e.g., sitting next to, live in same house, work in same office, same building)      Lives together  4. DURATION of CONTACT: \"How long were you in contact with the COVID-19 patient?\" (e.g., a few seconds, passed by person, a few minutes, 15 minutes or longer, live with the patient)      Lives " "together  5. MASK: \"Were you wearing a mask?\" \"Was the other person wearing a mask?\" Note: wearing a mask reduces the risk of an otherwise close contact.      no  6. DATE of CONTACT: \"When did you have contact with a COVID-19 patient?\" (e.g., how many days ago)      today  7. COMMUNITY SPREAD: \"Are there lots of cases of COVID-19 (community spread) where you live?\" (See public health department website, if unsure)        yes  8. SYMPTOMS: \"Do you have any symptoms?\" (e.g., fever, cough, breathing difficulty, loss of taste or smell)      no  9. PREGNANCY OR POSTPARTUM: \"Is there any chance you are pregnant?\" \"When was your last menstrual period?\" \"Did you deliver in the last 2 weeks?\"      na  10. HIGH RISK: \"Do you have any heart or lung problems?\" \"Do you have a weak immune system?\" (e.g., heart failure, COPD, asthma, HIV positive, chemotherapy, renal failure, diabetes mellitus, sickle cell anemia, obesity)        DM  11. TRAVEL: \"Have you traveled out of the country recently?\" If Yes, ask: \"When and where?\" Also ask about out-of-state travel, since the [facility] has identified some high-risk cities for community spread in the . Note: Travel becomes less relevant if there is widespread community transmission where the patient lives.        no    Protocols used: CORONAVIRUS (COVID-19) EXPOSURE-A- 8.25.2021    "

## 2021-11-09 ENCOUNTER — OFFICE VISIT (OUTPATIENT)
Dept: FAMILY MEDICINE | Facility: OTHER | Age: 61
End: 2021-11-09
Attending: FAMILY MEDICINE
Payer: COMMERCIAL

## 2021-11-09 DIAGNOSIS — Z20.822 EXPOSURE TO 2019 NOVEL CORONAVIRUS: ICD-10-CM

## 2021-11-09 LAB — SARS-COV-2 RNA RESP QL NAA+PROBE: POSITIVE

## 2021-11-09 PROCEDURE — U0003 INFECTIOUS AGENT DETECTION BY NUCLEIC ACID (DNA OR RNA); SEVERE ACUTE RESPIRATORY SYNDROME CORONAVIRUS 2 (SARS-COV-2) (CORONAVIRUS DISEASE [COVID-19]), AMPLIFIED PROBE TECHNIQUE, MAKING USE OF HIGH THROUGHPUT TECHNOLOGIES AS DESCRIBED BY CMS-2020-01-R: HCPCS | Mod: ZL

## 2021-11-10 ENCOUNTER — TELEPHONE (OUTPATIENT)
Dept: FAMILY MEDICINE | Facility: OTHER | Age: 61
End: 2021-11-10
Payer: COMMERCIAL

## 2021-11-10 NOTE — TELEPHONE ENCOUNTER
"Coronavirus (COVID-19) Notification    Caller Name (Patient, parent, daughter/son, grandparent, etc)  patient    Reason for call  Notify of Positive Coronavirus (COVID-19) lab results, assess symptoms,  review  [facility] recommendations    Lab Result    Lab test:  2019-nCoV rRt-PCR or SARS-CoV-2 PCR    Oropharyngeal AND/OR nasopharyngeal swabs is POSITIVE for 2019-nCoV RNA/SARS-COV-2 PCR (COVID-19 virus)    RN Recommendations/Instructions per [facility] Coronavirus COVID-19 recommendations    Brief introduction script  Introduce self then review script:  \"I am calling on behalf of [employer].  We were notified that your Coronavirus test (COVID-19) for was POSITIVE for the virus.  I have some information to relay to you but first I wanted to mention that the [place] Dept of Health will be contacting you shortly [it's possible MD already called Patient] to talk to you more about how you are feeling and other people you have had contact with who might now also have the virus.  Also,  [facility] is Partnering with the [facility] for Covid-19 research, you may be contacted directly by research staff.\"    Assessment (Inquire about Patient's current symptoms)   Assessment   Current Symptoms at time of phone call: (if no symptoms, document No symptoms] Cough, chest congestion, body aches   Symptoms onset (if applicable) 11/5/2021     If at time of call, Patients symptoms hare worsened, the Patient should contact 911 or have someone drive them to Emergency Dept promptly:      If Patient calling 911, inform 911 personal that you have tested positive for the Coronavirus (COVID-19).  Place mask on and await 911 to arrive.    If Emergency Dept, If possible, please have another adult drive you to the Emergency Dept but you need to wear mask when in contact with other people.      Monoclonal Antibody Administration    You may be eligible to receive a new treatment with a monoclonal antibody for " "preventing hospitalization in patients at high risk for complications from COVID-19.   This medication is still experimental and available on a limited basis; it is given through an IV and must be given at an infusion center. Please note that not all people who are eligible will receive the medication since it is in limited supply.     Are you interested in being considered for this medication?  Yes.   Is the patient symptomatic?  Yes. Is the patient 18 years of age or older? Yes.  Is the patient newly (within the last week) on supplemental oxygen or requiring more oxygen than usual?  No. Patient criteria for selection: Is the patients weight equal to or greater than 40 kg (88 lbs)? Yes.  Is the patient's age 65 years or older?  No. Is the patient 55 years or older and have one or more of the following conditions: Hypertension, CHF, COPD/Chronic pulmonary disease?  No. Is the patient 18 years or older and has one or more of the following conditions: Chronic Kidney Disease, Diabetes Mellitus, BMI equal to or greater than 35, Immunosuppressive Disease or taking Immunosuppressive medications?  Yes.  Patient qualifies, refer to [facility].  Does the patient fit the criteria: Yes: Patient referred to [employer] website, patient or family/friend will complete application.    If patient qualifies based on above criteria:  \"You will be contacted if you are selected to receive this treatment in the next 1-2 business days.   This is time sensitive and if you are not selected in the next 1-2 business days, you will not receive the medication.  If you do not receive a call to schedule, you have not been selected.\"      Review information with Patient    Your result was positive. This means you have COVID-19 (coronavirus).  We have sent you a letter that reviews the information that I'll be reviewing with you now.    How can I protect others?    If you have symptoms: stay home and away from others (self-isolate) until:    You've had no " fever--and no medicine that reduces fever--for 1 full day (24 hours). And       Your other symptoms have gotten better. For example, your cough or breathing has improved. And     At least 10 days have passed since your symptoms started. (If you've been told by a doctor that you have a weak immune system, wait 20 days.)     If you don't have symptoms: Stay home and away from others (self-isolate) until at least 10 days have passed since your first positive COVID-19 test. (Date test collected)    During this time:    Stay in your own room, including for meals. Use your own bathroom if you can.    Stay away from others in your home. No hugging, kissing or shaking hands. No visitors.     Don't go to work, school or anywhere else.     Clean  high touch  surfaces often (doorknobs, counters, handles, etc.). Use a household cleaning spray or wipes. You'll find a full list on the EPA website at www.epa.gov/pesticide-registration/list-n-disinfectants-use-against-sars-cov-2.     Cover your mouth and nose with a mask, tissue or other face covering to avoid spreading germs.    Wash your hands and face often with soap and water.    Make a list of people you have been in close contact with recently, even if either of you wore a face covering.   ; Start your list from 2 days before you became ill or had a positive test.  ; Include anyone that was within 6 feet of you for a cumulative total of 15 minutes or more in 24 hours. (Example: if you sat next to Carlyle for 5 minutes in the morning and 10 minutes in the afternoon, then you were in close contact for 15 minutes total that day. Carlyle would be added to your list.)    A public health worker will call or text you. It is important that you answer. They will ask you questions about possible exposures to COVID-19, such as people you have been in direct contact with and places you have visited.    Tell the people on your list that you have COVID-19; they should stay away from others for  14 days starting from the last time they were in contact with you (unless you are told something different from a public health worker).     Caregivers in these groups are at risk for severe illness due to COVID-19:  o People 65 years and older  o People who live in a nursing home or long-term care facility  o People with chronic disease (lung, heart, cancer, diabetes, kidney, liver, immunologic)  o People who have a weakened immune system, including those who:  - Are in cancer treatment  - Take medicine that weakens the immune system, such as corticosteroids  - Had a bone marrow or organ transplant  - Have an immune deficiency  - Have poorly controlled HIV or AIDS  - Are obese (body mass index of 40 or higher)  - Smoke regularly    Caregivers should wear gloves while washing dishes, handling laundry and cleaning bedrooms and bathrooms.    Wash and dry laundry with special caution. Don't shake dirty laundry, and use the warmest water setting you can.    If you have a weakened immune system, ask your doctor about other actions you should take.    For more tips, go to www.cdc.gov/coronavirus/2019-ncov/downloads/10Things.pdf.    You should not go back to work until you meet the guidelines above for ending your home isolation. You don't need to be retested for COVID-19 before going back to work--studies show that you won't spread the virus if it's been at least 10 days since your symptoms started (or 20 days, if you have a weak immune system).    Employers: This document serves as formal notice of your employee's medical guidelines for going back to work. They must meet the above guidelines before going back to work in person.    How can I take care of myself?    1. Get lots of rest. Drink extra fluids (unless a doctor has told you not to).    2. Take Tylenol (acetaminophen) for fever or pain. If you have liver or kidney problems, ask your family doctor if it's okay to take Tylenol.     Take either:     650 mg (two 325  mg pills) every 4 to 6 hours, or     1,000 mg (two 500 mg pills) every 8 hours as needed.     Note: Don't take more than 3,000 mg in one day. Acetaminophen is found in many medicines (both prescribed and over-the-counter medicines). Read all labels to be sure you don't take too much.    For children, check the Tylenol bottle for the right dose (based on their age or weight).    3. If you have other health problems (like cancer, heart failure, an organ transplant or severe kidney disease): Call your specialty clinic if you don't feel better in the next 2 days.    4. Know when to call 911: Emergency warning signs include:    Trouble breathing or shortness of breath    Pain or pressure in the chest that doesn't go away    Feeling confused like you haven't felt before, or not being able to wake up    Bluish-colored lips or face    5. Sign up for [employer]. We know it's scary to hear that you have COVID-19. We want to track your symptoms to make sure you're okay over the next 2 weeks. Please look for an email from [employer]--this is a free, online program that we'll use to keep in touch. To sign up, follow the link in the email. Learn more at [email or web address].    Where can I get more information?    [facility]: [email or web address]    Coronavirus Basics: www.health.[facility].[place]/diseases/coronavirus/basics.html    What to Do If You're Sick: www.cdc.gov/coronavirus/2019-ncov/about/steps-when-sick.html    Ending Home Isolation: www.cdc.gov/coronavirus/2019-ncov/hcp/disposition-in-home-patients.html     Caring for Someone with COVID-19: www.cdc.gov/coronavirus/2019-ncov/if-you-are-sick/care-for-someone.html     [facility] clinical trials (COVID-19 research studies): clinicalaffairs.[facility]-clinical-trials     A Positive COVID-19 letter will be sent via [employer] or the mail. (Exception, no letters sent to Presurgerical/Preprocedure Patients)    Deena Villatoro, LATONIAN

## 2021-11-15 ENCOUNTER — HOSPITAL ENCOUNTER (EMERGENCY)
Facility: HOSPITAL | Age: 61
Discharge: ANOTHER HOSPITAL | End: 2021-11-16
Attending: INTERNAL MEDICINE | Admitting: INTERNAL MEDICINE
Payer: COMMERCIAL

## 2021-11-15 ENCOUNTER — APPOINTMENT (OUTPATIENT)
Dept: GENERAL RADIOLOGY | Facility: HOSPITAL | Age: 61
End: 2021-11-15
Attending: INTERNAL MEDICINE
Payer: COMMERCIAL

## 2021-11-15 DIAGNOSIS — U07.1 PNEUMONIA DUE TO 2019 NOVEL CORONAVIRUS: ICD-10-CM

## 2021-11-15 DIAGNOSIS — K29.70 GASTRITIS WITHOUT BLEEDING, UNSPECIFIED CHRONICITY, UNSPECIFIED GASTRITIS TYPE: ICD-10-CM

## 2021-11-15 DIAGNOSIS — J12.82 PNEUMONIA DUE TO 2019 NOVEL CORONAVIRUS: ICD-10-CM

## 2021-11-15 DIAGNOSIS — R09.02 HYPOXIA: ICD-10-CM

## 2021-11-15 PROCEDURE — 80053 COMPREHEN METABOLIC PANEL: CPT | Performed by: INTERNAL MEDICINE

## 2021-11-15 PROCEDURE — 86140 C-REACTIVE PROTEIN: CPT | Performed by: INTERNAL MEDICINE

## 2021-11-15 PROCEDURE — 250N000011 HC RX IP 250 OP 636: Performed by: INTERNAL MEDICINE

## 2021-11-15 PROCEDURE — 99285 EMERGENCY DEPT VISIT HI MDM: CPT | Mod: 25

## 2021-11-15 PROCEDURE — 71045 X-RAY EXAM CHEST 1 VIEW: CPT

## 2021-11-15 PROCEDURE — 250N000013 HC RX MED GY IP 250 OP 250 PS 637: Performed by: INTERNAL MEDICINE

## 2021-11-15 PROCEDURE — 83690 ASSAY OF LIPASE: CPT | Performed by: INTERNAL MEDICINE

## 2021-11-15 PROCEDURE — 250N000009 HC RX 250: Performed by: INTERNAL MEDICINE

## 2021-11-15 PROCEDURE — 85025 COMPLETE CBC W/AUTO DIFF WBC: CPT | Performed by: INTERNAL MEDICINE

## 2021-11-15 PROCEDURE — 99285 EMERGENCY DEPT VISIT HI MDM: CPT | Performed by: INTERNAL MEDICINE

## 2021-11-15 PROCEDURE — 96374 THER/PROPH/DIAG INJ IV PUSH: CPT

## 2021-11-15 PROCEDURE — 258N000003 HC RX IP 258 OP 636: Performed by: INTERNAL MEDICINE

## 2021-11-15 PROCEDURE — 96375 TX/PRO/DX INJ NEW DRUG ADDON: CPT

## 2021-11-15 PROCEDURE — 36415 COLL VENOUS BLD VENIPUNCTURE: CPT | Performed by: INTERNAL MEDICINE

## 2021-11-15 PROCEDURE — 96361 HYDRATE IV INFUSION ADD-ON: CPT

## 2021-11-15 RX ORDER — ONDANSETRON 2 MG/ML
4 INJECTION INTRAMUSCULAR; INTRAVENOUS ONCE
Status: COMPLETED | OUTPATIENT
Start: 2021-11-15 | End: 2021-11-15

## 2021-11-15 RX ORDER — ONDANSETRON 4 MG/1
4 TABLET, ORALLY DISINTEGRATING ORAL ONCE
Status: COMPLETED | OUTPATIENT
Start: 2021-11-15 | End: 2021-11-15

## 2021-11-15 RX ORDER — MAGNESIUM HYDROXIDE/ALUMINUM HYDROXICE/SIMETHICONE 120; 1200; 1200 MG/30ML; MG/30ML; MG/30ML
30 SUSPENSION ORAL ONCE
Status: COMPLETED | OUTPATIENT
Start: 2021-11-15 | End: 2021-11-15

## 2021-11-15 RX ADMIN — FAMOTIDINE 20 MG: 10 INJECTION, SOLUTION INTRAVENOUS at 23:57

## 2021-11-15 RX ADMIN — ALUMINUM HYDROXIDE, MAGNESIUM HYDROXIDE, AND SIMETHICONE 30 ML: 200; 200; 20 SUSPENSION ORAL at 23:57

## 2021-11-15 RX ADMIN — ONDANSETRON 4 MG: 4 TABLET, ORALLY DISINTEGRATING ORAL at 22:50

## 2021-11-15 RX ADMIN — ONDANSETRON 4 MG: 2 INJECTION INTRAMUSCULAR; INTRAVENOUS at 23:57

## 2021-11-15 RX ADMIN — SODIUM CHLORIDE 1000 ML: 9 INJECTION, SOLUTION INTRAVENOUS at 23:57

## 2021-11-16 ENCOUNTER — DOCUMENTATION ONLY (OUTPATIENT)
Dept: EMERGENCY MEDICINE | Facility: HOSPITAL | Age: 61
End: 2021-11-16
Payer: COMMERCIAL

## 2021-11-16 ENCOUNTER — HOSPITAL ENCOUNTER (INPATIENT)
Facility: OTHER | Age: 61
LOS: 6 days | Discharge: HOME | End: 2021-11-22
Attending: INTERNAL MEDICINE | Admitting: STUDENT IN AN ORGANIZED HEALTH CARE EDUCATION/TRAINING PROGRAM
Payer: COMMERCIAL

## 2021-11-16 ENCOUNTER — APPOINTMENT (OUTPATIENT)
Dept: CT IMAGING | Facility: HOSPITAL | Age: 61
End: 2021-11-16
Attending: EMERGENCY MEDICINE
Payer: COMMERCIAL

## 2021-11-16 VITALS
WEIGHT: 205 LBS | RESPIRATION RATE: 20 BRPM | BODY MASS INDEX: 31.17 KG/M2 | DIASTOLIC BLOOD PRESSURE: 81 MMHG | SYSTOLIC BLOOD PRESSURE: 137 MMHG | TEMPERATURE: 100.1 F | OXYGEN SATURATION: 93 % | HEART RATE: 89 BPM

## 2021-11-16 DIAGNOSIS — U07.1 INFECTION DUE TO 2019 NOVEL CORONAVIRUS: ICD-10-CM

## 2021-11-16 DIAGNOSIS — J96.01 ACUTE RESPIRATORY FAILURE WITH HYPOXEMIA (H): Primary | ICD-10-CM

## 2021-11-16 DIAGNOSIS — E11.9 TYPE 2 DIABETES MELLITUS WITHOUT COMPLICATION, WITHOUT LONG-TERM CURRENT USE OF INSULIN (H): ICD-10-CM

## 2021-11-16 LAB
ALBUMIN SERPL-MCNC: 2.6 G/DL (ref 3.4–5)
ALP SERPL-CCNC: 52 U/L (ref 40–150)
ALT SERPL W P-5'-P-CCNC: 84 U/L (ref 0–70)
ANION GAP SERPL CALCULATED.3IONS-SCNC: 9 MMOL/L (ref 3–14)
AST SERPL W P-5'-P-CCNC: 54 U/L (ref 0–45)
BASOPHILS # BLD AUTO: 0 10E3/UL (ref 0–0.2)
BASOPHILS NFR BLD AUTO: 0 %
BILIRUB SERPL-MCNC: 0.7 MG/DL (ref 0.2–1.3)
BUN SERPL-MCNC: 20 MG/DL (ref 7–30)
CALCIUM SERPL-MCNC: 8.7 MG/DL (ref 8.5–10.1)
CHLORIDE BLD-SCNC: 101 MMOL/L (ref 94–109)
CO2 SERPL-SCNC: 27 MMOL/L (ref 20–32)
CREAT SERPL-MCNC: 0.89 MG/DL (ref 0.66–1.25)
CRP SERPL-MCNC: 130 MG/L (ref 0–8)
D DIMER PPP FEU-MCNC: 2.29 UG/ML FEU (ref 0–0.5)
EOSINOPHIL # BLD AUTO: 0 10E3/UL (ref 0–0.7)
EOSINOPHIL NFR BLD AUTO: 0 %
ERYTHROCYTE [DISTWIDTH] IN BLOOD BY AUTOMATED COUNT: 13.2 % (ref 10–15)
GFR SERPL CREATININE-BSD FRML MDRD: >90 ML/MIN/1.73M2
GLUCOSE BLD-MCNC: 267 MG/DL (ref 70–99)
HCT VFR BLD AUTO: 41.3 % (ref 40–53)
HGB BLD-MCNC: 14.1 G/DL (ref 13.3–17.7)
IMM GRANULOCYTES # BLD: 0.1 10E3/UL
IMM GRANULOCYTES NFR BLD: 1 %
LIPASE SERPL-CCNC: 184 U/L (ref 73–393)
LYMPHOCYTES # BLD AUTO: 0.4 10E3/UL (ref 0.8–5.3)
LYMPHOCYTES NFR BLD AUTO: 6 %
MCH RBC QN AUTO: 28.8 PG (ref 26.5–33)
MCHC RBC AUTO-ENTMCNC: 34.1 G/DL (ref 31.5–36.5)
MCV RBC AUTO: 85 FL (ref 78–100)
MONOCYTES # BLD AUTO: 0.7 10E3/UL (ref 0–1.3)
MONOCYTES NFR BLD AUTO: 11 %
NEUTROPHILS # BLD AUTO: 5.4 10E3/UL (ref 1.6–8.3)
NEUTROPHILS NFR BLD AUTO: 82 %
NRBC # BLD AUTO: 0 10E3/UL
NRBC BLD AUTO-RTO: 0 /100
PLATELET # BLD AUTO: 207 10E3/UL (ref 150–450)
POTASSIUM BLD-SCNC: 3.5 MMOL/L (ref 3.4–5.3)
PROT SERPL-MCNC: 7.1 G/DL (ref 6.8–8.8)
RBC # BLD AUTO: 4.89 10E6/UL (ref 4.4–5.9)
SODIUM SERPL-SCNC: 137 MMOL/L (ref 133–144)
WBC # BLD AUTO: 6.6 10E3/UL (ref 4–11)

## 2021-11-16 PROCEDURE — 250N000009 HC RX 250: Performed by: STUDENT IN AN ORGANIZED HEALTH CARE EDUCATION/TRAINING PROGRAM

## 2021-11-16 PROCEDURE — 250N000013 HC RX MED GY IP 250 OP 250 PS 637: Performed by: STUDENT IN AN ORGANIZED HEALTH CARE EDUCATION/TRAINING PROGRAM

## 2021-11-16 PROCEDURE — 250N000012 HC RX MED GY IP 250 OP 636 PS 637: Performed by: STUDENT IN AN ORGANIZED HEALTH CARE EDUCATION/TRAINING PROGRAM

## 2021-11-16 PROCEDURE — 99223 1ST HOSP IP/OBS HIGH 75: CPT | Performed by: STUDENT IN AN ORGANIZED HEALTH CARE EDUCATION/TRAINING PROGRAM

## 2021-11-16 PROCEDURE — 250N000011 HC RX IP 250 OP 636: Performed by: STUDENT IN AN ORGANIZED HEALTH CARE EDUCATION/TRAINING PROGRAM

## 2021-11-16 PROCEDURE — 250N000011 HC RX IP 250 OP 636: Performed by: INTERNAL MEDICINE

## 2021-11-16 PROCEDURE — 258N000003 HC RX IP 258 OP 636: Performed by: STUDENT IN AN ORGANIZED HEALTH CARE EDUCATION/TRAINING PROGRAM

## 2021-11-16 PROCEDURE — 250N000011 HC RX IP 250 OP 636: Performed by: RADIOLOGY

## 2021-11-16 PROCEDURE — 85379 FIBRIN DEGRADATION QUANT: CPT | Performed by: EMERGENCY MEDICINE

## 2021-11-16 PROCEDURE — 96376 TX/PRO/DX INJ SAME DRUG ADON: CPT | Mod: XU

## 2021-11-16 PROCEDURE — 999N000157 HC STATISTIC RCP TIME EA 10 MIN

## 2021-11-16 PROCEDURE — 71275 CT ANGIOGRAPHY CHEST: CPT

## 2021-11-16 PROCEDURE — XW033E5 INTRODUCTION OF REMDESIVIR ANTI-INFECTIVE INTO PERIPHERAL VEIN, PERCUTANEOUS APPROACH, NEW TECHNOLOGY GROUP 5: ICD-10-PCS | Performed by: STUDENT IN AN ORGANIZED HEALTH CARE EDUCATION/TRAINING PROGRAM

## 2021-11-16 PROCEDURE — 120N000001 HC R&B MED SURG/OB

## 2021-11-16 RX ORDER — POLYETHYLENE GLYCOL 3350 17 G/17G
17 POWDER, FOR SOLUTION ORAL DAILY PRN
Status: DISCONTINUED | OUTPATIENT
Start: 2021-11-16 | End: 2021-11-22 | Stop reason: HOSPADM

## 2021-11-16 RX ORDER — OMEPRAZOLE 40 MG/1
40 CAPSULE, DELAYED RELEASE ORAL DAILY
Status: DISCONTINUED | OUTPATIENT
Start: 2021-11-16 | End: 2021-11-16 | Stop reason: CLARIF

## 2021-11-16 RX ORDER — PANTOPRAZOLE SODIUM 40 MG/1
40 TABLET, DELAYED RELEASE ORAL
Status: DISCONTINUED | OUTPATIENT
Start: 2021-11-17 | End: 2021-11-22 | Stop reason: HOSPADM

## 2021-11-16 RX ORDER — ONDANSETRON 4 MG/1
4 TABLET, ORALLY DISINTEGRATING ORAL EVERY 8 HOURS PRN
Qty: 10 TABLET | Refills: 0 | Status: ON HOLD | OUTPATIENT
Start: 2021-11-16 | End: 2021-11-22

## 2021-11-16 RX ORDER — FAMOTIDINE 20 MG/1
20 TABLET, FILM COATED ORAL 2 TIMES DAILY
Qty: 20 TABLET | Refills: 0 | Status: ON HOLD | OUTPATIENT
Start: 2021-11-16 | End: 2021-11-22

## 2021-11-16 RX ORDER — METFORMIN HCL 500 MG
500 TABLET, EXTENDED RELEASE 24 HR ORAL 2 TIMES DAILY WITH MEALS
Status: ON HOLD | COMMUNITY
End: 2021-11-22

## 2021-11-16 RX ORDER — PROCHLORPERAZINE 25 MG
25 SUPPOSITORY, RECTAL RECTAL EVERY 12 HOURS PRN
Status: DISCONTINUED | OUTPATIENT
Start: 2021-11-16 | End: 2021-11-22 | Stop reason: HOSPADM

## 2021-11-16 RX ORDER — PREDNISONE 20 MG/1
TABLET ORAL
Qty: 10 TABLET | Refills: 0 | Status: ON HOLD | OUTPATIENT
Start: 2021-11-16 | End: 2021-11-22

## 2021-11-16 RX ORDER — ONDANSETRON 4 MG/1
4 TABLET, ORALLY DISINTEGRATING ORAL EVERY 6 HOURS PRN
Status: DISCONTINUED | OUTPATIENT
Start: 2021-11-16 | End: 2021-11-22 | Stop reason: HOSPADM

## 2021-11-16 RX ORDER — IOPAMIDOL 755 MG/ML
68 INJECTION, SOLUTION INTRAVASCULAR ONCE
Status: COMPLETED | OUTPATIENT
Start: 2021-11-16 | End: 2021-11-16

## 2021-11-16 RX ORDER — NICOTINE POLACRILEX 4 MG
15-30 LOZENGE BUCCAL
Status: DISCONTINUED | OUTPATIENT
Start: 2021-11-16 | End: 2021-11-22 | Stop reason: HOSPADM

## 2021-11-16 RX ORDER — PROCHLORPERAZINE MALEATE 10 MG
10 TABLET ORAL EVERY 6 HOURS PRN
Status: DISCONTINUED | OUTPATIENT
Start: 2021-11-16 | End: 2021-11-22 | Stop reason: HOSPADM

## 2021-11-16 RX ORDER — FAMOTIDINE 20 MG/1
20 TABLET, FILM COATED ORAL 2 TIMES DAILY
Status: DISCONTINUED | OUTPATIENT
Start: 2021-11-16 | End: 2021-11-22 | Stop reason: HOSPADM

## 2021-11-16 RX ORDER — SODIUM CHLORIDE 9 MG/ML
INJECTION, SOLUTION INTRAVENOUS CONTINUOUS
Status: DISCONTINUED | OUTPATIENT
Start: 2021-11-16 | End: 2021-11-17

## 2021-11-16 RX ORDER — ONDANSETRON 2 MG/ML
4 INJECTION INTRAMUSCULAR; INTRAVENOUS EVERY 6 HOURS PRN
Status: DISCONTINUED | OUTPATIENT
Start: 2021-11-16 | End: 2021-11-22 | Stop reason: HOSPADM

## 2021-11-16 RX ORDER — ASPIRIN 81 MG/1
81 TABLET ORAL DAILY
Status: DISCONTINUED | OUTPATIENT
Start: 2021-11-17 | End: 2021-11-22 | Stop reason: HOSPADM

## 2021-11-16 RX ORDER — SODIUM CHLORIDE, SODIUM LACTATE, POTASSIUM CHLORIDE, CALCIUM CHLORIDE 600; 310; 30; 20 MG/100ML; MG/100ML; MG/100ML; MG/100ML
INJECTION, SOLUTION INTRAVENOUS CONTINUOUS
Status: DISCONTINUED | OUTPATIENT
Start: 2021-11-16 | End: 2021-11-16

## 2021-11-16 RX ORDER — ACETAMINOPHEN 650 MG/1
650 SUPPOSITORY RECTAL EVERY 6 HOURS PRN
Status: DISCONTINUED | OUTPATIENT
Start: 2021-11-16 | End: 2021-11-22 | Stop reason: HOSPADM

## 2021-11-16 RX ORDER — DEXTROSE MONOHYDRATE 25 G/50ML
25-50 INJECTION, SOLUTION INTRAVENOUS
Status: DISCONTINUED | OUTPATIENT
Start: 2021-11-16 | End: 2021-11-22 | Stop reason: HOSPADM

## 2021-11-16 RX ORDER — ATORVASTATIN CALCIUM 10 MG/1
10 TABLET, FILM COATED ORAL EVERY EVENING
Status: DISCONTINUED | OUTPATIENT
Start: 2021-11-16 | End: 2021-11-22 | Stop reason: HOSPADM

## 2021-11-16 RX ORDER — ALBUTEROL SULFATE 90 UG/1
2 AEROSOL, METERED RESPIRATORY (INHALATION) 4 TIMES DAILY PRN
Status: DISCONTINUED | OUTPATIENT
Start: 2021-11-16 | End: 2021-11-22 | Stop reason: HOSPADM

## 2021-11-16 RX ORDER — ACETAMINOPHEN 325 MG/1
650 TABLET ORAL EVERY 6 HOURS PRN
Status: DISCONTINUED | OUTPATIENT
Start: 2021-11-16 | End: 2021-11-22 | Stop reason: HOSPADM

## 2021-11-16 RX ORDER — LIDOCAINE 40 MG/G
CREAM TOPICAL
Status: DISCONTINUED | OUTPATIENT
Start: 2021-11-16 | End: 2021-11-22 | Stop reason: HOSPADM

## 2021-11-16 RX ORDER — DEXAMETHASONE SODIUM PHOSPHATE 10 MG/ML
8 INJECTION, SOLUTION INTRAMUSCULAR; INTRAVENOUS ONCE
Status: COMPLETED | OUTPATIENT
Start: 2021-11-16 | End: 2021-11-16

## 2021-11-16 RX ADMIN — ACETAMINOPHEN 650 MG: 325 TABLET, FILM COATED ORAL at 21:05

## 2021-11-16 RX ADMIN — INSULIN ASPART 4 UNITS: 100 INJECTION, SOLUTION INTRAVENOUS; SUBCUTANEOUS at 17:49

## 2021-11-16 RX ADMIN — REMDESIVIR 200 MG: 100 INJECTION, POWDER, LYOPHILIZED, FOR SOLUTION INTRAVENOUS at 16:18

## 2021-11-16 RX ADMIN — ENOXAPARIN SODIUM 40 MG: 40 INJECTION SUBCUTANEOUS at 21:08

## 2021-11-16 RX ADMIN — DEXAMETHASONE SODIUM PHOSPHATE 8 MG: 10 INJECTION, SOLUTION INTRAMUSCULAR; INTRAVENOUS at 00:44

## 2021-11-16 RX ADMIN — FAMOTIDINE 20 MG: 20 TABLET ORAL at 21:05

## 2021-11-16 RX ADMIN — IOPAMIDOL 68 ML: 755 INJECTION, SOLUTION INTRAVENOUS at 09:05

## 2021-11-16 RX ADMIN — ATORVASTATIN CALCIUM 10 MG: 10 TABLET, FILM COATED ORAL at 21:05

## 2021-11-16 RX ADMIN — SODIUM CHLORIDE: 9 INJECTION, SOLUTION INTRAVENOUS at 16:17

## 2021-11-16 ASSESSMENT — ACTIVITIES OF DAILY LIVING (ADL)
DRESSING/BATHING_DIFFICULTY: NO
DIFFICULTY_COMMUNICATING: NO
CONCENTRATING,_REMEMBERING_OR_MAKING_DECISIONS_DIFFICULTY: NO
HEARING_DIFFICULTY_OR_DEAF: NO
DIFFICULTY_EATING/SWALLOWING: NO
WALKING_OR_CLIMBING_STAIRS_DIFFICULTY: NO
ADLS_ACUITY_SCORE: 5
FALL_HISTORY_WITHIN_LAST_SIX_MONTHS: NO
PATIENT_/_FAMILY_COMMUNICATION_STYLE: SPOKEN LANGUAGE (ENGLISH OR BILINGUAL)
ADLS_ACUITY_SCORE: 5
ADLS_ACUITY_SCORE: 7
TOILETING_ISSUES: NO
WEAR_GLASSES_OR_BLIND: YES
ADLS_ACUITY_SCORE: 5
ADLS_ACUITY_SCORE: 7
ADLS_ACUITY_SCORE: 5
DOING_ERRANDS_INDEPENDENTLY_DIFFICULTY: YES
VISION_MANAGEMENT: GLASS
ADLS_ACUITY_SCORE: 7

## 2021-11-16 ASSESSMENT — ENCOUNTER SYMPTOMS
NAUSEA: 1
VOICE CHANGE: 0
MYALGIAS: 0
BLOOD IN STOOL: 0
ANAL BLEEDING: 0
FLANK PAIN: 0
HEADACHES: 0
FEVER: 1
CHEST TIGHTNESS: 0
COUGH: 0
NUMBNESS: 0
DIAPHORESIS: 0
SHORTNESS OF BREATH: 1
NECK PAIN: 0
COLOR CHANGE: 0
DIZZINESS: 0
DYSURIA: 0
CHILLS: 0
CONFUSION: 0
SLEEP DISTURBANCE: 0
ABDOMINAL DISTENTION: 0
LIGHT-HEADEDNESS: 0
WHEEZING: 0
ABDOMINAL PAIN: 1
FREQUENCY: 0
BACK PAIN: 0
PALPITATIONS: 0
WEAKNESS: 0
VOMITING: 1

## 2021-11-16 ASSESSMENT — MIFFLIN-ST. JEOR: SCORE: 1773.38

## 2021-11-16 NOTE — PHARMACY-ADMISSION MEDICATION HISTORY
Pharmacy -- Admission Medication Reconciliation    Prior to admission (PTA) medications were reviewed and the patient's PTA medication list was updated.    Sources Consulted: patient phone interview, sure scripts, chart review    The reliability of this Medication Reconciliation is: Reliability: Reliable    The following significant changes were made:    Added discharge pharmacy    Updated atorvastatin to evening    Updated metformin directions      **patient was prescribed famotidine, zofran, and prednisone yesterday.  Has NOT picked these up from his pharmacy yet.    **patient has not taken any medications in 3-4 days due to not feeling well.    In addition, the patient's allergies were reviewed with the patient and updated as follows:   Allergies: Patient has no known allergies.    The pharmacist has reviewed with the patient that all personal medications should be removed from the building or locked in the belongings safe.  Patient shall only take medications ordered by the physician and administered by the nursing staff.       Medication barriers identified: unable to take meds, not eating or feeling weel   Medication adherence concerns: up to date refills   Understanding of emergency medications: checks blood sugars    Lindsay Panchal RPH, 11/16/2021,  2:19 PM

## 2021-11-16 NOTE — PROGRESS NOTES
I certify that this patient, Junior Castellanos has been under my care (or a nurse practitioner or physican's assistant working with me). This is the face-to-face encounter for oxygen medical necessity.       Junior Castellanos is now in a chronic stable state and continues to require supplemental oxygen. Patient has continued oxygen desaturation due to COVID Pneumonia     Alternative treatment(s) tried or considered and deemed clinically infective for treatment of hypoxia include steroids.   If portability is ordered, is the patient mobile within the home? yes     **Patients who qualify for home O2 coverage under the CMS guidelines require ABG tests or O2 sat readings obtained closest to, but no earlier than 2 days prior to the discharge, as evidence of the need for home oxygen therapy. Testing must be performed while patient is in the chronic stable state. See notes for O2 sats

## 2021-11-16 NOTE — PROGRESS NOTES
RESPIRATORY THERAPY ASSESSMENT    Assessment:     Reason for Assessment: Other (see comments) (Covid) (11/16/21 1645)    Pulmonary History:    Pulmonary Status: Smoking history, less that 1 PPD (11/16/21 1645)    Smoking cessation information given: No     Surgical:  Surgical Status: No surgery or greater than/equal 4 weeks post-op (11/16/21 1645)    CXR:   Chest X-ray: Bilateral pulmonary infiltrates (11/16/21 1645)    Respiratory Pattern:    Respiratory Pattern: Regular pattern 12-20 (11/16/21 1645)    Breath Sounds:    Breath Sounds: Crackles (11/16/21 1645)    Effectiveness of Cough:     Cough Effectiveness: Strong, productive (11/16/21 1645)    Mental Status:    Mental Status: Alert, oriented, cooperative (11/16/21 1645)    Level of Activity:     Acuity Level : Acuity 3 (11-15 points) (11/16/21 1645)    Current O2 Requirement:   O2 Required for SpO2>=92%: Greater than 50% and less than 100% (11/16/21 1645)    Patient uses home oxygen:   No    Does the patient have a diagnosis of COPD? Coreen Benitez, RT on 11/16/2021 at 4:51 PM

## 2021-11-16 NOTE — ED PROVIDER NOTES
History     Chief Complaint   Patient presents with     Shortness of Breath     Vomiting     The history is provided by the patient.   Shortness of Breath  Severity:  Moderate  Onset quality:  Gradual  Timing:  Constant  Progression:  Worsening  Chronicity:  New  Associated symptoms: abdominal pain, fever and vomiting    Associated symptoms: no chest pain, no cough, no diaphoresis, no headaches, no neck pain, no rash and no wheezing        Allergies:  No Known Allergies    Problem List:    Patient Active Problem List    Diagnosis Date Noted     Hyperlipidemia associated with type 2 diabetes mellitus (H) 01/17/2020     Priority: Medium     Type 2 diabetes mellitus without complication, without long-term current use of insulin (H) 11/18/2019     Priority: Medium     Uncontrolled         JAGUAR on CPAP      Priority: Medium        Past Medical History:    Past Medical History:   Diagnosis Date     JAGUAR on CPAP        Past Surgical History:    Past Surgical History:   Procedure Laterality Date     COLONOSCOPY N/A 5/20/2016    Procedure: COLONOSCOPY;  Surgeon: Mayo Felix DO;  Location: [facility]     UPPER GI ENDOSCOPY         Family History:    No family history on file.    Social History:  Marital Status:   [2]  Social History     Tobacco Use     Smoking status: Former Smoker     Types: Cigarettes     Smokeless tobacco: Never Used   Substance Use Topics     Alcohol use: No     Alcohol/week: 0.0 standard drinks     Drug use: No        Medications:    famotidine (PEPCID) 20 MG tablet  ondansetron (ZOFRAN ODT) 4 MG ODT tab  predniSONE (DELTASONE) 20 MG tablet  aspirin 81 MG EC tablet  atorvastatin (LIPITOR) 10 MG tablet  blood glucose (NO BRAND SPECIFIED) lancets standard  CONTOUR NEXT TEST test strip  metFORMIN (GLUCOPHAGE-XR) 500 MG 24 hr tablet  Microlet Lancets MISC  Microlet Lancets MISC  omeprazole (PRILOSEC) 40 MG DR capsule  UNABLE TO FIND  Vitamin D, Cholecalciferol, 10 MCG (400 UNIT)  TABS          Review of Systems   Constitutional: Positive for fever. Negative for chills and diaphoresis.   HENT: Negative for voice change.    Eyes: Negative for visual disturbance.   Respiratory: Positive for shortness of breath. Negative for cough, chest tightness and wheezing.    Cardiovascular: Negative for chest pain, palpitations and leg swelling.   Gastrointestinal: Positive for abdominal pain, nausea and vomiting. Negative for abdominal distention, anal bleeding and blood in stool.   Genitourinary: Negative for decreased urine volume, dysuria, flank pain and frequency.   Musculoskeletal: Negative for back pain, gait problem, myalgias and neck pain.   Skin: Negative for color change, pallor and rash.   Neurological: Negative for dizziness, syncope, weakness, light-headedness, numbness and headaches.   Psychiatric/Behavioral: Negative for confusion, sleep disturbance and suicidal ideas.       Physical Exam   BP: 138/88  Pulse: 112  Temp: 100.4  F (38  C)  Resp: 26  SpO2: (!) 86 %      Physical Exam  Vitals and nursing note reviewed.   Constitutional:       Appearance: He is well-developed. He is ill-appearing. He is not toxic-appearing or diaphoretic.   HENT:      Head: Normocephalic and atraumatic.   Eyes:      Conjunctiva/sclera: Conjunctivae normal.      Pupils: Pupils are equal, round, and reactive to light.   Neck:      Thyroid: No thyromegaly.      Vascular: No JVD.      Trachea: No tracheal deviation.   Cardiovascular:      Rate and Rhythm: Normal rate and regular rhythm.      Heart sounds: Normal heart sounds. No murmur heard.  No gallop.    Pulmonary:      Effort: Pulmonary effort is normal. No respiratory distress.      Breath sounds: No stridor. Examination of the right-lower field reveals rhonchi. Examination of the left-lower field reveals rhonchi. Rhonchi present. No wheezing or rales.   Chest:      Chest wall: No tenderness.   Abdominal:      General: Bowel sounds are normal. There is no  distension.      Palpations: Abdomen is soft. There is no mass.      Tenderness: There is no abdominal tenderness. There is no guarding or rebound.   Musculoskeletal:         General: No tenderness. Normal range of motion.      Cervical back: Normal range of motion and neck supple.   Lymphadenopathy:      Cervical: No cervical adenopathy.   Skin:     General: Skin is warm.      Coloration: Skin is not pale.      Findings: No erythema or rash.   Neurological:      Mental Status: He is alert and oriented to person, place, and time.   Psychiatric:         Behavior: Behavior normal.         ED Course        Procedures                Results for orders placed or performed during the hospital encounter of 11/15/21 (from the past 24 hour(s))   CBC with Platelets & Differential    Narrative    The following orders were created for panel order CBC with Platelets & Differential.  Procedure                               Abnormality         Status                     ---------                               -----------         ------                     CBC with platelets and d...[[id number]]  Abnormal            Final result                 Please view results for these tests on the individual orders.   Comprehensive metabolic panel   Result Value Ref [place]    Sodium 137 133 - 144 mmol/L    Potassium 3.5 3.4 - 5.3 mmol/L    Chloride 101 94 - 109 mmol/L    Carbon Dioxide (CO2) 27 20 - 32 mmol/L    Anion Gap 9 3 - 14 mmol/L    Urea Nitrogen 20 7 - 30 mg/dL    Creatinine 0.89 0.66 - 1.25 mg/dL    Calcium 8.7 8.5 - 10.1 mg/dL    Glucose 267 (H) 70 - 99 mg/dL    Alkaline Phosphatase 52 40 - 150 U/L    AST 54 (H) 0 - 45 U/L    ALT 84 (H) 0 - 70 U/L    Protein Total 7.1 6.8 - 8.8 g/dL    Albumin 2.6 (L) 3.4 - 5.0 g/dL    Bilirubin Total 0.7 0.2 - 1.3 mg/dL    GFR Estimate >90 >60 mL/min/1.73m2   Lipase   Result Value Ref [place]    Lipase 184 73 - 393 U/L   CRP inflammation   Result Value Ref [place]    CRP Inflammation 130.0 (H) 0.0 - 8.0  mg/L   CBC with platelets and differential   Result Value Ref [place]    WBC Count 6.6 4.0 - 11.0 10e3/uL    RBC Count 4.89 4.40 - 5.90 10e6/uL    Hemoglobin 14.1 13.3 - 17.7 g/dL    Hematocrit 41.3 40.0 - 53.0 %    MCV 85 78 - 100 fL    MCH 28.8 26.5 - 33.0 pg    MCHC 34.1 31.5 - 36.5 g/dL    RDW 13.2 10.0 - 15.0 %    Platelet Count 207 150 - 450 10e3/uL    % Neutrophils 82 %    % Lymphocytes 6 %    % Monocytes 11 %    % Eosinophils 0 %    % Basophils 0 %    % Immature Granulocytes 1 %    NRBCs per 100 WBC 0 <1 /100    Absolute Neutrophils 5.4 1.6 - 8.3 10e3/uL    Absolute Lymphocytes 0.4 (L) 0.8 - 5.3 10e3/uL    Absolute Monocytes 0.7 0.0 - 1.3 10e3/uL    Absolute Eosinophils 0.0 0.0 - 0.7 10e3/uL    Absolute Basophils 0.0 0.0 - 0.2 10e3/uL    Absolute Immature Granulocytes 0.1 (H) <=0.0 10e3/uL    Absolute NRBCs 0.0 10e3/uL       Medications   ondansetron (ZOFRAN-ODT) ODT tab 4 mg (4 mg Oral Given 11/15/21 2250)   0.9% sodium chloride BOLUS (0 mLs Intravenous Stopped 11/16/21 0228)   alum & mag hydroxide-simethicone (MAALOX) suspension 30 mL (30 mLs Oral Given 11/15/21 2357)   ondansetron (ZOFRAN) injection 4 mg (4 mg Intravenous Given 11/15/21 2357)   famotidine (PEPCID) injection 20 mg (20 mg Intravenous Given 11/15/21 2357)   dexamethasone PF (DECADRON) injection 8 mg (8 mg Intravenous Given 11/16/21 0044)       Assessments & Plan (with Medical Decision Making)   Flu like symptoms started 8 days ago, diagnosed with covid  Increasing SOB , epigastric pian , nausea .   His GI symptoms improved after antiacid treatment tolerated po intake  Primary plan was to be discharged home with home o2 but at the end of my shift became more hypoxic, NRBM placed , s/o to Dr Dolan   I have reviewed the nursing notes.    I have reviewed the findings, diagnosis, plan and need for follow up with the patient.      New Prescriptions    FAMOTIDINE (PEPCID) 20 MG TABLET    Take 1 tablet (20 mg) by mouth 2 times daily for 10 days     ONDANSETRON (ZOFRAN ODT) 4 MG ODT TAB    Take 1 tablet (4 mg) by mouth every 8 hours as needed for nausea    PREDNISONE (DELTASONE) 20 MG TABLET    Take two tablets (= 40mg) each day for 5 (five) days       Final diagnoses:   Pneumonia due to 2019 novel coronavirus   Hypoxia   Gastritis without bleeding, unspecified chronicity, unspecified gastritis type       11/15/2021   [facility]     Mauricio Emanuel MD  11/16/21 8489

## 2021-11-16 NOTE — H&P
[facility]    History and Physical  Hospitalist       Date of Admission:  11/16/2021    Assessment & Plan   Junior Castellanos is a 60 year old man with a past medical history of type II diabetes, JAGUAR on CPAP, and hyperlipidemia who presents with Diarrhea, nausea and COVID Pneumonia.        Principal Problem:    Acute respiratory failure with hypoxemia (H)    JAGUAR on CPAP   Infection due to 2019 novel coronavirus    Assessment: Covid positive since 11/9/21. Now developed hypoxic respiratory failure requiring 6L. Persistent nausea and vomiting is his presenting symptom. Appears volume down on admission secondary to vomiting. If oxygen needs increase overnight will intensify therapy to baracitinib.     Plan:   -admit to inpatient  -supplemental O2, goal sats >90%  -dexamethasone 6mg daily  -if decompensates would cover for bacterial pneumonia as well as has been throwing up but no known aspiration.   -remdesivir  -/hr for 10 hours, stop if hypoxia worsening.          Type 2 diabetes mellitus without complication, without long-term current use of insulin (H)    Assessment: On metformin only at home. Last A1C 7.8 on 1/17/20. May need to start glargine if sugars rise with steroids.     Plan:   -hemoglobin A1C in AM  -QID AC HS glucose checks  -Medium corrective scale        Hyperlipidemia associated with type 2 diabetes mellitus (H)    Assessment: continue home atorvastatin.     DVT Prophylaxis: Enoxaparin (Lovenox) SQ  Code Status: Full Code    Louie Leung    Primary Care Physician   [facility]    Chief Complaint   vomiting  History is obtained from the patient and chart review.    History of Present Illness     Junior Castellanos is a 60 year old man with a past medical history of type II diabetes who was admitted with COVID 19 pneumonia.     Patient is an unvaccinated individual whose whole family is sick with COVID-19.  He states repacked personally with past 10 days he has been feeling  unwell and having frequent vomiting.  He has no abdominal pain.  He has been unable to eat or drink very much due to the persistent vomiting.  He had a positive COVID-19 test on 12-9-21.  He has a family member here in the hospital as well with Covid.    On the morning of admission he states that he was very confused and persistently vomiting and could not carry out a simple action due to being confused from being hypoxic.  He has not traveled anywhere recently.  He has not had any abnormal foods.  He has had no sick contacts other than his infected family with Covid.  Discussed with him about his CODE STATUS and he is full code, would be willing to have a trial of ventilator if needed.  We discussed the possibility of starting percent neb in the future and he is willing to have this despite the risk of clotting.    He is a former smoker.  He smoked for approximately 20 years 1 pack/day.  No drug use, none drinker.    Past Medical History    I have reviewed this patient's medical history and updated it with pertinent information if needed.   Past Medical History:   Diagnosis Date     JAGUAR on CPAP        Past Surgical History   I have reviewed this patient's surgical history and updated it with pertinent information if needed.  Past Surgical History:   Procedure Laterality Date     COLONOSCOPY N/A 5/20/2016    Procedure: COLONOSCOPY;  Surgeon: Mayo Felix DO;  Location: [facility]     UPPER GI ENDOSCOPY         Prior to Admission Medications   Prior to Admission Medications   Prescriptions Last Dose Informant Patient Reported? Taking?   CONTOUR NEXT TEST test strip NA at NA Self No No   Sig: USE TO TEST BLOOD SUGAR TWICE DAILY OR AS DIRECTED   Microlet Lancets MISC NA at NA Self No No   Sig: USE TO TEST BLOOD SUGAR TWICE DAILY OR AS DIRECTED   Microlet Lancets MISC NA at NA Self No No   Sig: Use to test twice a day   UNABLE TO FIND Past Week at AM Self Yes Yes   Sig: MEDICATION NAME: Multivitamin plant base  liquid, 2 Tbsp daily   Vitamin D, Cholecalciferol, 10 MCG (400 UNIT) TABS Past Week at AM Self Yes Yes   Sig: Take 800 Units by mouth daily   aspirin 81 MG EC tablet Past Week at AM Self Yes Yes   Sig: Take 81 mg by mouth daily   atorvastatin (LIPITOR) 10 MG tablet Past Week at PM Self Yes Yes   Sig: Take 10 mg by mouth every evening    blood glucose (NO BRAND SPECIFIED) lancets standard NA at NA Self No No   Sig: Use to test blood sugar 2 times daily or as directed.   famotidine (PEPCID) 20 MG tablet not started at not started Self No No   Sig: Take 1 tablet (20 mg) by mouth 2 times daily for 10 days   metFORMIN (GLUCOPHAGE-XR) 500 MG 24 hr tablet Past Week at PM Self Yes Yes   Sig: Take 500 mg by mouth 2 times daily (with meals)    omeprazole (PRILOSEC) 40 MG DR capsule Past Week at AM Self Yes Yes   Sig: Take 40 mg by mouth daily    ondansetron (ZOFRAN ODT) 4 MG ODT tab not started at not started Self No No   Sig: Take 1 tablet (4 mg) by mouth every 8 hours as needed for nausea   predniSONE (DELTASONE) 20 MG tablet not started at not started Self No No   Sig: Take two tablets (= 40mg) each day for 5 (five) days      Facility-Administered Medications: None     Allergies   No Known Allergies    Social History   I have reviewed this patient's social history and updated it with pertinent information if needed. Junior Castellanos  reports that he has quit smoking. His smoking use included cigarettes. He has never used smokeless tobacco. He reports that he does not drink alcohol and does not use drugs.    Family History   I have reviewed this patient's family history and updated it with pertinent information if needed.       Review of Systems     REVIEW OF SYSTEMS:    Comprehensive 12 point review of systems other than those listed in the HPI are otherwise negative.      Physical Exam   Temp: 99.4  F (37.4  C) Temp src: Oral BP: 136/77 Pulse: 97   Resp: 20 SpO2: (!) 89 % O2 Device: Nasal cannula Oxygen Delivery: 6  LPM  Vital Signs with Ranges  Temp:  [98.9  F (37.2  C)-100.4  F (38  C)] 99.4  F (37.4  C)  Pulse:  [] 97  Resp:  [17-27] 20  BP: (126-153)/(77-95) 136/77  SpO2:  [85 %-95 %] 89 %  216 lbs 11.39 oz    GENERAL: 60-year-old man lying in bed with supplemental oxygen in place  EYES:External eyes and pupil reactions were normal.  HEAD, EARS, NOSE, MOUTH, AND THROAT: Head and facial appearance were normal. Hearing was normal to voice and ear appearance was normal. Nose appearance was normal and there was no discharge. Oropharynx was normal.  NECK: Neck appearance was normal and there were no neck masses.  RESPIRATORY: Bibasilar crackles  CARDIOVASCULAR: Regular rate and rhythm, no peripheral edema appreciated  GASTROINTESTINAL: The abdomen was normal in contour.  There was no mass, organ enlargement, or tenderness.  MUSCULOSKELETAL: Skeletal configurationand muscle mass were normal for age. Joint appearance was overall normal.  LYMPHATIC: There were no enlarged cervical nodes.  SKIN/HAIR/NAILS: Skin color was normal and there were no important skin lesions.  Hair and nails were normal.  NEUROLOGIC: The patient was alert and fully oriented. Speech was normal. Cranial nerves (2, 3, 4, 6, 7, 9, 10, 12) were normal. Extremity motion was normal and symmetric.  PSYCHIATRIC:  Mood and affect were normal and the patient had normal recent and remote memory. The patient's judgment and insight were normal.      Data   Data reviewed today:  I personally reviewed the chest x-ray image(s) showing Multifocal Covid pneumonia..  Recent Labs   Lab 11/15/21  2357   WBC 6.6   HGB 14.1   MCV 85         POTASSIUM 3.5   CHLORIDE 101   CO2 27   BUN 20   CR 0.89   ANIONGAP 9   DOMENIC 8.7   *   ALBUMIN 2.6*   PROTTOTAL 7.1   BILITOTAL 0.7   ALKPHOS 52   ALT 84*   AST 54*   LIPASE 184       Recent Results (from the past 24 hour(s))   XR Chest Port 1 View    Narrative    PROCEDURE: XR CHEST PORT 1 VIEW 11/16/2021 12:16  AM    HISTORY: sob    COMPARISONS: 11/7/2019.    TECHNIQUE: Single portable view.    FINDINGS: Heart is stable in size. There are new patchy bilateral lung  infiltrates in mid and lower lungs. There is no pleural effusion.         Impression    IMPRESSION: Patchy infiltrates consistent with multifocal pneumonia.    GERONIMO MALDONADO MD         SYSTEM ID:  [id number]   CTA Chest with Contrast    Narrative    PROCEDURE: CTA CHEST WITH CONTRAST 11/16/2021 9:20 AM    HISTORY: PE suspected, low/intermediate prob, positive D-dimer.   hypoxemia    COMPARISONS: None.    Meds/Dose Given: ISOVUE 370 68ml    TECHNIQUE: CT angiogram of the chest with sagittal and coronal MIPS  reconstructions    FINDINGS: There are no pulmonary emboli. There is mild pericardial  thickening or effusion which with maximal thickness of 7 mm. The heart  is normal in size. Thoracic aorta appears normal.    Widespread pulmonary parenchymal opacities are noted throughout both  lungs consistent with viral pneumonia. The hilar and mediastinal lymph  nodes are normal in caliber. Axillary and supraclavicular lymph nodes  appear normal. There are small bilateral pleural effusions. The upper  portion of the liver spleen and pancreas appear normal. There are some  calcifications seen in the gallbladder wall.         Impression    IMPRESSION: No pulmonary emboli. Widespread pulmonary parenchymal  opacities consistent with pneumonia    NEDA BARRON MD         SYSTEM ID:  [id number]

## 2021-11-16 NOTE — ED NOTES
Patient states when his SpO2 dropped he was not breathing like he should due to being very nauseated and holding emesis bag over mouth. Switched from NRB to NC at 6 LPM at 0800 and SpO2 stayed 95%. Brought to ED room 3 via wheelchair for negative flow room. Tolerated well. SpO2 93% to 95% on NC at 6 LPM after transferring from wheelchair to bed. Not placed on high flow NC at this time. Dr. Valadez informed and states OK. Patient denies nausea at this time. Denies pain. Placed on monitors. Call light within reach. Warm blanket given. Denies any needs at this time.

## 2021-11-16 NOTE — PLAN OF CARE
"Pateint is being admitted for Covid. Patient's vitals are stable. /77 (BP Location: Right arm)   Pulse 97   Temp 99.4  F (37.4  C) (Oral)   Resp 20   Ht 1.753 m (5' 9\")   Wt 98.3 kg (216 lb 11.4 oz)   SpO2 (!) 89%   BMI 32.00 kg/m    Patient O2 was at 89% on 6L via NC. Lung sounds were diminished in both upper lobes and crackles in in both bases. Patient denies pain and denies SOB. Patient was place on high flow nasal cannula at 7L and is at 90%. Patient Blood sugar was 300 at supper time 4 units on apsart was given.   Jeffery Barrera RN on 11/16/2021 at 7:12 PM    "

## 2021-11-16 NOTE — ED NOTES
Face to face report given with opportunity to observe patient.    Report given to LUCITA Quiles RN   11/16/2021  7:48 AM

## 2021-11-16 NOTE — ED NOTES
Pt desated to 86 on 3 lpm NC after using urinal in bed. Pt placed on nonrebreather at 15 lpm, sats remain high 80s/low 90s, MD aware, RT notified.  Pt understands he will not be discharging at this time. Pt appears in no acute distress and denies pain.

## 2021-11-16 NOTE — ED NOTES
Pt presents to ED with c/o n/v abd pain, SOB after COVID dx on Tuesday.  Pt was found to be 86% RA.  Currently 93% on 2 lpm NC.  Pt denies CP.

## 2021-11-16 NOTE — DISCHARGE INSTRUCTIONS
What to expect when you have contrast    During your exam, we will inject  contrast  into your vein or artery. (Contrast is a clear liquid with iodine in it. It shows up on X-rays.)    You may feel warm or hot. You may have a metal taste in your mouth and a slight upset stomach. You may also feel pressure near the kidneys and bladder. These effects will last about 1 to 3 minutes.    Please tell us if you have:    Sneezing     Itching    Hives     Swelling in the face    A hoarse voice    Breathing problems    Other new symptoms    Serious problems are rare.  They may include:    Irregular heartbeat     Seizures    Kidney failure              Tissue damage    Shock      Death    If you have any problems during the exam, we  will treat them right away.    When you get home    Call your hospital if you have any new symptoms in the next 2 days, like hives or swelling. (Phone numbers are at the bottom of this page.) Or call your family doctor.     If you have wheezing or trouble breathing, call 911.    Self-care  -Drink at least 4 extra glasses of water today.   This reduces the stress on your kidneys.  -Keep taking your regular medicines.    The contrast will pass out of your body in your  Urine(pee). This will happen in the next 24 hours. You  will not feel this. Your urine will not  change color.    If you have kidney problems or take metformin    Drink 4 to 8 large glasses of water for the next  2 days, if you are not on a fluid restriction.    ?If you take metformin (Glucophage or Glucovance) for diabetes, keep taking it.      ?Your kidney function tests are abnormal.  If you take Metformin, do not take it for 48 hours. Please go to your clinic for a blood test within 3 days after your exam before the restarting this medicine.     (Note to provider:please give patient prescription for lab tests.)    ?Special instructions: -    I have read and understand the above information.    Patient Sign  Here:______________________________________Date:________Time:______    Staff Sign Here:________________________________________Date:_______Time:______      Radiology Departments:     ?[facility]: [phone] ?[place]: [phone]     ?[place]: [phone] ?[place]:[phone]      ?[place]: [phone]  ?[place]: [phone]  ?[place]:[phone]    ?[facility]:[phone]  ?[facility]:[phone] Provider:  Arranged through [employer], contact number [phone].  If you have any questions or concerns please call the oxygen company directly.

## 2021-11-16 NOTE — PROGRESS NOTES
Admission Note    Data:  Junior Castellanos admitted to [facility] from [facility] at 1355.      Action:  Dr. Leung has been notified of admission. Pt oriented to unit, call light in reach.     Response:  Patient tolerated the transfer well.

## 2021-11-16 NOTE — ED NOTES
Salome at [facility] informed that patient is leaving ER to get into ambulance at this time and heading to [facility]. Salome states she will inform LUCITA Cifuentes.

## 2021-11-16 NOTE — ED NOTES
Attempted to call report to [facility]. Charge nurse states nurse taking patient is unavailable to get report at this time and patient is not to leave our ED until report is given. Informed them that ambulance will be here in about 20 minutes so there is time for RN to call for report and with how difficult transportation can be to find and arrange we have to take the ambulances we can get for transport. Luisana Dumont, ER manager, informed of this situation. Awaiting call back from LUCITA Cifuentes, to give report.

## 2021-11-16 NOTE — ED NOTES
Meds 1 here to transport patient to [facility]. Bedside report given to EMS. EMS placing patient on their monitors. Patient denies pain or any questions or concerns.

## 2021-11-16 NOTE — ED NOTES
Called [facility] with patient information.  They will be calling back within 30 minutes with further instructions.

## 2021-11-16 NOTE — ED NOTES
Ruperto to wife Prabha to update her on patient transfer in case patient was unable to update her. Prabha states patient has been texting her but she wasn't sure if he had left yet. Prabha informed patient has left our ED and should be arriving  to [facility] any time now. Verbalized understanding and denied any questions or concerns.

## 2021-11-16 NOTE — ED PROVIDER NOTES
ED Signout Note    Brief HPI:  Patient is a 60-year-old male history of type 2 diabetes who presented to the emergency department with diarrhea for the last 8 days as well as flulike symptoms.  Was found to be hypoxic and was stable overnight on 3 L of oxygen via nasal cannula.  Chest x-ray with consolidations consistent with Covid and his test was positive.    Repeat Exam:  General: Well-developed elderly male patient, sitting up in bed, no distress  Respiratory: Able to speak full sentences on supplemental oxygen via nonrebreather mask, coarse lung sounds bilateral lung fields  CV: Borderline tachycardic heart rate, regular rhythm    MDM:  Patient is a 60-year-old male patient who was seen overnight for flulike symptoms and diarrhea, found to be Covid positive, initially stable overnight on oxygen 3 L via nasal cannula however this morning when attempting to get up did desaturate further and ultimately required nonrebreather to maintain saturations in the low 90s.  Despite this he was not in any significant degree of respiratory distress with his low oxygen levels.  Contacted RT.  Ultimately the patient was able to stabilize but his oxygen requirement did increase to 6 L via nasal cannula, holding on high flow oxygen but this certainly seems like it might be a possible need as he progresses.  He already received Decadron.  Our hospital is currently in capacity crisis, so reached out to [facility] where the patient was accepted for transfer for [facility] admission.  Patient was consented for transfer and I spoke with the hospitalist there Dr. Gates.     Alon Valadez MD  11/16/21 9782

## 2021-11-16 NOTE — ED NOTES
Called [facility] to give report due to it being over 30 minutes with no call back. Report was able to be given to LUCITA Cifuentes, at this time. Esau requests a phone call to inform them when patient is leaving our ED and on his way there.

## 2021-11-16 NOTE — PROGRESS NOTES
Received intake call for home oxygen at 3:10AM. Reviewed patient's chart; Patient qualifies under insurance guidelines and all documentation is in the chart including a good order. .   3:35AM- Spoke with care coordinator, ALONZO, confirmed we received the order, and provided them with ETA of oxygen.

## 2021-11-16 NOTE — ED NOTES
Bed: ED03  Expected date:   Expected time:   Means of arrival:   Comments:  [facility]

## 2021-11-17 LAB
ANION GAP SERPL CALCULATED.3IONS-SCNC: 8 MMOL/L (ref 3–14)
BASOPHILS # BLD AUTO: 0 10E3/UL (ref 0–0.2)
BASOPHILS NFR BLD AUTO: 0 %
BUN SERPL-MCNC: 21 MG/DL (ref 7–25)
CALCIUM SERPL-MCNC: 8.2 MG/DL (ref 8.6–10.3)
CHLORIDE BLD-SCNC: 104 MMOL/L (ref 98–107)
CO2 SERPL-SCNC: 27 MMOL/L (ref 21–31)
CREAT SERPL-MCNC: 0.81 MG/DL (ref 0.7–1.3)
CRP SERPL-MCNC: 92.2 MG/L
D DIMER PPP FEU-MCNC: 2.04 UG/ML FEU (ref 0–0.5)
EOSINOPHIL # BLD AUTO: 0 10E3/UL (ref 0–0.7)
EOSINOPHIL NFR BLD AUTO: 0 %
ERYTHROCYTE [DISTWIDTH] IN BLOOD BY AUTOMATED COUNT: 13.4 % (ref 10–15)
FIBRINOGEN PPP-MCNC: 648 MG/DL (ref 170–490)
GFR SERPL CREATININE-BSD FRML MDRD: >90 ML/MIN/1.73M2
GLUCOSE BLD-MCNC: 231 MG/DL (ref 70–105)
HBA1C MFR BLD: 7.6 % (ref 4–6.2)
HCT VFR BLD AUTO: 39 % (ref 40–53)
HGB BLD-MCNC: 13.2 G/DL (ref 13.3–17.7)
IMM GRANULOCYTES # BLD: 0.1 10E3/UL
IMM GRANULOCYTES NFR BLD: 1 %
LYMPHOCYTES # BLD AUTO: 1 10E3/UL (ref 0.8–5.3)
LYMPHOCYTES NFR BLD AUTO: 12 %
MCH RBC QN AUTO: 29 PG (ref 26.5–33)
MCHC RBC AUTO-ENTMCNC: 33.8 G/DL (ref 31.5–36.5)
MCV RBC AUTO: 86 FL (ref 78–100)
MONOCYTES # BLD AUTO: 0.5 10E3/UL (ref 0–1.3)
MONOCYTES NFR BLD AUTO: 7 %
NEUTROPHILS # BLD AUTO: 6.6 10E3/UL (ref 1.6–8.3)
NEUTROPHILS NFR BLD AUTO: 80 %
NRBC # BLD AUTO: 0 10E3/UL
NRBC BLD AUTO-RTO: 0 /100
PLATELET # BLD AUTO: 258 10E3/UL (ref 150–450)
POTASSIUM BLD-SCNC: 3.8 MMOL/L (ref 3.5–5.1)
RBC # BLD AUTO: 4.55 10E6/UL (ref 4.4–5.9)
SODIUM SERPL-SCNC: 139 MMOL/L (ref 134–144)
WBC # BLD AUTO: 8.2 10E3/UL (ref 4–11)

## 2021-11-17 PROCEDURE — 250N000013 HC RX MED GY IP 250 OP 250 PS 637: Performed by: STUDENT IN AN ORGANIZED HEALTH CARE EDUCATION/TRAINING PROGRAM

## 2021-11-17 PROCEDURE — 85379 FIBRIN DEGRADATION QUANT: CPT | Performed by: STUDENT IN AN ORGANIZED HEALTH CARE EDUCATION/TRAINING PROGRAM

## 2021-11-17 PROCEDURE — 250N000011 HC RX IP 250 OP 636: Performed by: STUDENT IN AN ORGANIZED HEALTH CARE EDUCATION/TRAINING PROGRAM

## 2021-11-17 PROCEDURE — 85384 FIBRINOGEN ACTIVITY: CPT | Performed by: STUDENT IN AN ORGANIZED HEALTH CARE EDUCATION/TRAINING PROGRAM

## 2021-11-17 PROCEDURE — 258N000003 HC RX IP 258 OP 636: Performed by: STUDENT IN AN ORGANIZED HEALTH CARE EDUCATION/TRAINING PROGRAM

## 2021-11-17 PROCEDURE — 120N000001 HC R&B MED SURG/OB

## 2021-11-17 PROCEDURE — 99233 SBSQ HOSP IP/OBS HIGH 50: CPT | Performed by: STUDENT IN AN ORGANIZED HEALTH CARE EDUCATION/TRAINING PROGRAM

## 2021-11-17 PROCEDURE — 250N000012 HC RX MED GY IP 250 OP 636 PS 637: Performed by: STUDENT IN AN ORGANIZED HEALTH CARE EDUCATION/TRAINING PROGRAM

## 2021-11-17 PROCEDURE — 36415 COLL VENOUS BLD VENIPUNCTURE: CPT | Performed by: STUDENT IN AN ORGANIZED HEALTH CARE EDUCATION/TRAINING PROGRAM

## 2021-11-17 PROCEDURE — 85025 COMPLETE CBC W/AUTO DIFF WBC: CPT | Performed by: STUDENT IN AN ORGANIZED HEALTH CARE EDUCATION/TRAINING PROGRAM

## 2021-11-17 PROCEDURE — 83036 HEMOGLOBIN GLYCOSYLATED A1C: CPT | Performed by: STUDENT IN AN ORGANIZED HEALTH CARE EDUCATION/TRAINING PROGRAM

## 2021-11-17 PROCEDURE — 86140 C-REACTIVE PROTEIN: CPT | Performed by: STUDENT IN AN ORGANIZED HEALTH CARE EDUCATION/TRAINING PROGRAM

## 2021-11-17 PROCEDURE — 250N000009 HC RX 250: Performed by: STUDENT IN AN ORGANIZED HEALTH CARE EDUCATION/TRAINING PROGRAM

## 2021-11-17 PROCEDURE — 999N000157 HC STATISTIC RCP TIME EA 10 MIN

## 2021-11-17 PROCEDURE — 80048 BASIC METABOLIC PNL TOTAL CA: CPT | Performed by: STUDENT IN AN ORGANIZED HEALTH CARE EDUCATION/TRAINING PROGRAM

## 2021-11-17 RX ADMIN — INSULIN ASPART 3 UNITS: 100 INJECTION, SOLUTION INTRAVENOUS; SUBCUTANEOUS at 12:12

## 2021-11-17 RX ADMIN — ENOXAPARIN SODIUM 40 MG: 40 INJECTION SUBCUTANEOUS at 21:28

## 2021-11-17 RX ADMIN — ASPIRIN 81 MG: 81 TABLET, COATED ORAL at 09:38

## 2021-11-17 RX ADMIN — FAMOTIDINE 20 MG: 20 TABLET ORAL at 09:38

## 2021-11-17 RX ADMIN — BARICITINIB 4 MG: 2 TABLET, FILM COATED ORAL at 09:38

## 2021-11-17 RX ADMIN — FAMOTIDINE 20 MG: 20 TABLET ORAL at 21:28

## 2021-11-17 RX ADMIN — ATORVASTATIN CALCIUM 10 MG: 10 TABLET, FILM COATED ORAL at 21:37

## 2021-11-17 RX ADMIN — DEXAMETHASONE 6 MG: 2 TABLET ORAL at 09:38

## 2021-11-17 RX ADMIN — REMDESIVIR 100 MG: 100 INJECTION, POWDER, LYOPHILIZED, FOR SOLUTION INTRAVENOUS at 16:54

## 2021-11-17 RX ADMIN — PANTOPRAZOLE SODIUM 40 MG: 40 TABLET, DELAYED RELEASE ORAL at 09:38

## 2021-11-17 RX ADMIN — SODIUM CHLORIDE 50 ML: 9 INJECTION, SOLUTION INTRAVENOUS at 17:29

## 2021-11-17 ASSESSMENT — MIFFLIN-ST. JEOR: SCORE: 1702.56

## 2021-11-17 ASSESSMENT — ACTIVITIES OF DAILY LIVING (ADL)
ADLS_ACUITY_SCORE: 7

## 2021-11-17 NOTE — PROGRESS NOTES
Shift Summary    RCAT was completed.  Pt does wear a home CPAP and will ask family to bring it in tomorrow.  Pt was found on 6 lpm with SpO2 87%.  Changed to a high flow nc and increased to 7 lpm with SpO2 92%.      Mariana Benitez, RT on 11/16/2021 at 7:30 PM

## 2021-11-17 NOTE — PLAN OF CARE
Pt had a coughing fit around 2100 tonight.  Sats dropped to the low 80's during this time.  Pt did slowly recover to the mid 80's but required an increase in O2 to 10L HFNC to keep sats at 90% while sleeping.  Currently laying on left side prone and sating at 90%.  Will continue to monitor closely.  Porsha Hayward RN............................ 11/16/2021 10:27 PM    Pt found to be sating at 96% on 10L.  Turned back down to 7L and pt now sating at 93%.  Still laying left side prone.  Porsha Hayward RN............................ 11/16/2021 11:09 PM    Pt had been satting well on 7L for several hours.  Now sats have dropped to 88%.  Pt laying right side prone.  Turned O2 to 10L and pt now sating at 90%.    Porsha Hayward RN............................ 11/17/2021 1:13 AM    Snoring noted when in room getting VS.  Pt also has pauses in breathing when sleeping.  Sating well at 92% on 10L HFNC.  Porsah Hayward RN............................ 11/17/2021 2:48 AM    Pt continues to maintain sats around 90-91% on 10L High flow O2.  Up to the bathroom with SBA and tolerated well.  Currently sleeping left side prone sating at 90% on 10L.    Porsha Hayward RN............................ 11/17/2021 5:15 AM

## 2021-11-17 NOTE — PROGRESS NOTES
[facility]    Hospitalist Progress Note      Assessment & Plan   Junior Castellanos is a 60 year old man with a past medical history of type II diabetes, JAGUAR on CPAP, and hyperlipidemia who presents with Diarrhea, nausea and COVID Pneumonia.     Acute respiratory failure with hypoxemia (H)    JAGUAR on CPAP   Infection due to 2019 novel coronavirus    Assessment: Covid positive since 11/9/21. 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 Oxygen needs rapidly increased at time of admit, initiated baracitinib after informed discussion with the patient.     Plan:   -admit to inpatient  -supplemental O2, goal sats >90%  -dexamethasone 6mg daily  -if decompensates would cover for bacterial pneumonia as well as has been throwing up but no known aspiration.   -remdesivir             Type 2 diabetes mellitus without complication, without long-term current use of insulin (H)    Assessment: On metformin only at home. Last A1C 7.6 on Admission. Hyperglycemic with steroids but fasting AM sugar 139, increase sliding scale today, change to diabetic diet and if persistently elevated may do daytime NPH.     Plan:   -QID AC HS glucose checks  -High corrective scale daily  -Medium corrective scale QHS          Hyperlipidemia associated with type 2 diabetes mellitus (H)    Assessment: continue home atorvastatin.     Diet: Combination Diet Regular Diet Adult    DVT Prophylaxis: Enoxaparin (Lovenox) SQ  Ervin Catheter: Not present  Code Status: Full Code           Disposition Plan   Expected discharge: likely several more days pending improvement in hypoxia.   Entered: Louie Leung MD 11/17/2021, 1:27 PM       The patient's care was discussed with the multidisciplinary team.     Louie Leung MD  Hospitalist Service  [facility]  Contact information available via [facility] Paging/Directory    ______________________________________________________________________    Interval History    Feels ok  Thirsty  Nausea improved  Breathing stable.   No chest pain.   Does not feel short of breath.     -Data reviewed today: I reviewed all new labs and imaging results over the last 24 hours. I personally reviewed no images or EKG's today.    Physical Exam   Temp: 97.5  F (36.4  C) Temp src: Tympanic BP: 133/77 Pulse: 100   Resp: 18 SpO2: 93 % O2 Device: High Flow Nasal Cannula (HFNC) Oxygen Delivery: 11 LPM  Vitals:    11/16/21 1358 11/17/21 0321   Weight: 98.3 kg (216 lb 11.4 oz) 91.2 kg (201 lb 1.6 oz)     Vital Signs with Ranges  Temp:  [96.4  F (35.8  C)-100.9  F (38.3  C)] 97.5  F (36.4  C)  Pulse:  [] 100  Resp:  [18-20] 18  BP: (133-136)/(72-77) 133/77  SpO2:  [87 %-95 %] 93 %  I/O last 3 completed shifts:  In: 1176 [I.V.:1176]  Out: -     Constitutional: Pleasant 60 year old man lying in bed in no acute distress.   Respiratory: Supplemental oxygen in place. Coarse breath sounds  Cardiovascular: regular rate and rhythm, no murmur, rubs or peripheral edema appreciated   GI: soft, nontener BS+  Skin/Integumen: normal       Medications       sodium chloride 0.9%  50 mL Intravenous Once     remdesivir  100 mg Intravenous Q24H    And     sodium chloride 0.9%  50 mL Intravenous Q24H     aspirin  81 mg Oral Daily     atorvastatin  10 mg Oral QPM     baricitinib  4 mg Oral Daily     dexamethasone  6 mg Oral Daily     enoxaparin ANTICOAGULANT  40 mg Subcutaneous Q24H     famotidine  20 mg Oral BID     insulin aspart  1-7 Units Subcutaneous TID AC     insulin aspart  1-5 Units Subcutaneous At Bedtime     pantoprazole  40 mg Oral QAM AC     sodium chloride (PF)  3 mL Intracatheter Q8H       Data   Recent Labs   Lab 11/17/21  0743 11/15/21  2357   WBC 8.2 6.6   HGB 13.2* 14.1   MCV 86 85    207    137   POTASSIUM 3.8 3.5   CHLORIDE 104 101   CO2 27 27   BUN 21 20   CR 0.81 0.89   ANIONGAP 8 9   DOMENIC 8.2* 8.7   * 267*   ALBUMIN  --  2.6*   PROTTOTAL  --  7.1   BILITOTAL  --  0.7   ALKPHOS   --  52   ALT  --  84*   AST  --  54*   LIPASE  --  184       No results found for this or any previous visit (from the past 24 hour(s)).

## 2021-11-17 NOTE — PROGRESS NOTES
Pts SpO2 86% helped pt to prone position.  SpO2 increased to 93%.    Pt family bringing in CPAP asked them to leave outside of room for engineering to check.    Mariana Benitez RT on 11/17/2021 at 11:01 AM

## 2021-11-17 NOTE — PLAN OF CARE
Patient is pleasant. Vitals are stable. Patient is on 11L of O2 via high flow nasal canula. Patient was up to the bathroom today and desated to 75% patient was turned up to 13L and sats were at 90%. Writer rechecked and patient's sats were 86% on 13L. Patient was turned up to 15L and turned to side lying since he did not want to prone. Patient sats increased to 95% patient was weaned down to original 11L and sats are at 92%  Lungs were diminished throughout. No new concerns at this time.  Jeffery Barrera RN on 11/17/2021 at 6:09 PM

## 2021-11-18 LAB
ALT SERPL W P-5'-P-CCNC: 41 U/L (ref 7–52)
ANION GAP SERPL CALCULATED.3IONS-SCNC: 9 MMOL/L (ref 3–14)
AST SERPL W P-5'-P-CCNC: 26 U/L (ref 13–39)
BUN SERPL-MCNC: 20 MG/DL (ref 7–25)
CALCIUM SERPL-MCNC: 7.9 MG/DL (ref 8.6–10.3)
CHLORIDE BLD-SCNC: 103 MMOL/L (ref 98–107)
CO2 SERPL-SCNC: 25 MMOL/L (ref 21–31)
CREAT SERPL-MCNC: 0.73 MG/DL (ref 0.7–1.3)
CRP SERPL-MCNC: 105 MG/L
D DIMER PPP FEU-MCNC: 1.13 UG/ML FEU (ref 0–0.5)
ERYTHROCYTE [DISTWIDTH] IN BLOOD BY AUTOMATED COUNT: 13.3 % (ref 10–15)
FIBRINOGEN PPP-MCNC: 630 MG/DL (ref 170–490)
GFR SERPL CREATININE-BSD FRML MDRD: >90 ML/MIN/1.73M2
GLUCOSE BLD-MCNC: 284 MG/DL (ref 70–105)
HCT VFR BLD AUTO: 36.7 % (ref 40–53)
HGB BLD-MCNC: 12.4 G/DL (ref 13.3–17.7)
MAGNESIUM SERPL-MCNC: 2.1 MG/DL (ref 1.9–2.7)
MCH RBC QN AUTO: 29 PG (ref 26.5–33)
MCHC RBC AUTO-ENTMCNC: 33.8 G/DL (ref 31.5–36.5)
MCV RBC AUTO: 86 FL (ref 78–100)
PLATELET # BLD AUTO: 260 10E3/UL (ref 150–450)
POTASSIUM BLD-SCNC: 4.5 MMOL/L (ref 3.5–5.1)
RBC # BLD AUTO: 4.27 10E6/UL (ref 4.4–5.9)
SODIUM SERPL-SCNC: 137 MMOL/L (ref 134–144)
WBC # BLD AUTO: 7.6 10E3/UL (ref 4–11)

## 2021-11-18 PROCEDURE — 85384 FIBRINOGEN ACTIVITY: CPT | Performed by: STUDENT IN AN ORGANIZED HEALTH CARE EDUCATION/TRAINING PROGRAM

## 2021-11-18 PROCEDURE — 250N000011 HC RX IP 250 OP 636: Performed by: STUDENT IN AN ORGANIZED HEALTH CARE EDUCATION/TRAINING PROGRAM

## 2021-11-18 PROCEDURE — 250N000009 HC RX 250: Performed by: STUDENT IN AN ORGANIZED HEALTH CARE EDUCATION/TRAINING PROGRAM

## 2021-11-18 PROCEDURE — 80048 BASIC METABOLIC PNL TOTAL CA: CPT | Performed by: STUDENT IN AN ORGANIZED HEALTH CARE EDUCATION/TRAINING PROGRAM

## 2021-11-18 PROCEDURE — 258N000003 HC RX IP 258 OP 636: Performed by: STUDENT IN AN ORGANIZED HEALTH CARE EDUCATION/TRAINING PROGRAM

## 2021-11-18 PROCEDURE — 85379 FIBRIN DEGRADATION QUANT: CPT | Performed by: STUDENT IN AN ORGANIZED HEALTH CARE EDUCATION/TRAINING PROGRAM

## 2021-11-18 PROCEDURE — 99233 SBSQ HOSP IP/OBS HIGH 50: CPT | Performed by: STUDENT IN AN ORGANIZED HEALTH CARE EDUCATION/TRAINING PROGRAM

## 2021-11-18 PROCEDURE — 120N000001 HC R&B MED SURG/OB

## 2021-11-18 PROCEDURE — 86140 C-REACTIVE PROTEIN: CPT | Performed by: STUDENT IN AN ORGANIZED HEALTH CARE EDUCATION/TRAINING PROGRAM

## 2021-11-18 PROCEDURE — 84460 ALANINE AMINO (ALT) (SGPT): CPT | Performed by: STUDENT IN AN ORGANIZED HEALTH CARE EDUCATION/TRAINING PROGRAM

## 2021-11-18 PROCEDURE — 250N000012 HC RX MED GY IP 250 OP 636 PS 637: Performed by: STUDENT IN AN ORGANIZED HEALTH CARE EDUCATION/TRAINING PROGRAM

## 2021-11-18 PROCEDURE — 999N000157 HC STATISTIC RCP TIME EA 10 MIN

## 2021-11-18 PROCEDURE — 85027 COMPLETE CBC AUTOMATED: CPT | Performed by: STUDENT IN AN ORGANIZED HEALTH CARE EDUCATION/TRAINING PROGRAM

## 2021-11-18 PROCEDURE — 83735 ASSAY OF MAGNESIUM: CPT | Performed by: STUDENT IN AN ORGANIZED HEALTH CARE EDUCATION/TRAINING PROGRAM

## 2021-11-18 PROCEDURE — 84450 TRANSFERASE (AST) (SGOT): CPT | Performed by: STUDENT IN AN ORGANIZED HEALTH CARE EDUCATION/TRAINING PROGRAM

## 2021-11-18 PROCEDURE — 250N000013 HC RX MED GY IP 250 OP 250 PS 637: Performed by: STUDENT IN AN ORGANIZED HEALTH CARE EDUCATION/TRAINING PROGRAM

## 2021-11-18 PROCEDURE — 36415 COLL VENOUS BLD VENIPUNCTURE: CPT | Performed by: STUDENT IN AN ORGANIZED HEALTH CARE EDUCATION/TRAINING PROGRAM

## 2021-11-18 RX ADMIN — INSULIN HUMAN 12 UNITS: 100 INJECTION, SUSPENSION SUBCUTANEOUS at 08:55

## 2021-11-18 RX ADMIN — SODIUM CHLORIDE 50 ML: 9 INJECTION, SOLUTION INTRAVENOUS at 16:04

## 2021-11-18 RX ADMIN — ENOXAPARIN SODIUM 40 MG: 40 INJECTION SUBCUTANEOUS at 21:52

## 2021-11-18 RX ADMIN — ATORVASTATIN CALCIUM 10 MG: 10 TABLET, FILM COATED ORAL at 21:53

## 2021-11-18 RX ADMIN — REMDESIVIR 100 MG: 100 INJECTION, POWDER, LYOPHILIZED, FOR SOLUTION INTRAVENOUS at 16:04

## 2021-11-18 RX ADMIN — BARICITINIB 4 MG: 2 TABLET, FILM COATED ORAL at 11:31

## 2021-11-18 RX ADMIN — FAMOTIDINE 20 MG: 20 TABLET ORAL at 21:53

## 2021-11-18 RX ADMIN — PANTOPRAZOLE SODIUM 40 MG: 40 TABLET, DELAYED RELEASE ORAL at 11:32

## 2021-11-18 RX ADMIN — FAMOTIDINE 20 MG: 20 TABLET ORAL at 11:32

## 2021-11-18 RX ADMIN — ASPIRIN 81 MG: 81 TABLET, COATED ORAL at 11:31

## 2021-11-18 RX ADMIN — DEXAMETHASONE 6 MG: 2 TABLET ORAL at 11:32

## 2021-11-18 ASSESSMENT — ACTIVITIES OF DAILY LIVING (ADL)
ADLS_ACUITY_SCORE: 7
ADLS_ACUITY_SCORE: 9
ADLS_ACUITY_SCORE: 7
ADLS_ACUITY_SCORE: 9
ADLS_ACUITY_SCORE: 9
ADLS_ACUITY_SCORE: 7
ADLS_ACUITY_SCORE: 9
ADLS_ACUITY_SCORE: 7
ADLS_ACUITY_SCORE: 9
ADLS_ACUITY_SCORE: 7
ADLS_ACUITY_SCORE: 9
ADLS_ACUITY_SCORE: 7

## 2021-11-18 ASSESSMENT — MIFFLIN-ST. JEOR: SCORE: 1724.79

## 2021-11-18 NOTE — PROGRESS NOTES
Patient remains on high flow nasal canula with 11 L. Sats lower 90% no issues encouraged to cough and deep breath. Patient side lying through most of shift. Will continue to monitor.Kaye Chan RN on 11/17/2021 at 10:21 PM

## 2021-11-18 NOTE — PROGRESS NOTES
Shift Summary    RT will continue to attempt to wean.  Pt SpO2 is much better with proning.  Pt O2 needs increased some today.  He is currently on 11 lpm.  Wife is supposed to bring in his home CPAP.    Mariana Benitez, RT on 11/17/2021 at 6:50 PM

## 2021-11-18 NOTE — PLAN OF CARE
Patient is alert and oriented and up SBA. Patient has had low O2 saturation and needed additional O2. Currently on 11L O2 via high flow nasal cannula. Patient has been proning and getting good saturations. Patient has had no complaints. Will continue to monitor. Jeffery Martinez RN on 11/18/2021 at 2:55 PM      Problem: Activity Intolerance (Pulmonary Impairment)  Goal: Improved Activity Tolerance  Outcome: Improving     Problem: Gas Exchange Impaired (Pulmonary Impairment)  Goal: Optimal Gas Exchange  Outcome: Declining

## 2021-11-18 NOTE — PROGRESS NOTES
[facility]    Hospitalist Progress Note      Assessment & Plan   Junior Castellanos is a 60 year old man with a past medical history of type II diabetes, JAGUAR on CPAP, and hyperlipidemia who presents with Diarrhea, nausea and COVID Pneumonia.     Acute respiratory failure with hypoxemia (H)    JAGUAR on CPAP   Infection due to 2019 novel coronavirus    Assessment: Covid positive since 11/9/21. 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 Oxygen needs rapidly increased at time of admit, initiated baracitinib after informed discussion with the patient. Since stabilized and weaning as able. Inflammatory markers down-trending. Still with significant desaturations with coughing and movement.     Plan:   -supplemental O2, goal sats >90%  -dexamethasone 6mg daily  -baricitinib 4mg daily  -lovenox for dvt ppx   -remdesivir             Type 2 diabetes mellitus without complication, without long-term current use of insulin (H)    Assessment: On metformin only at home. Last A1C 7.6 on Admission. Hyperglycemic with steroids, start NPH during day.     Plan:   -QID AC HS glucose checks  -High corrective scale daily  -Medium corrective scale at bedtime  -diabetic diet  -NPH 12 units QAM          Hyperlipidemia associated with type 2 diabetes mellitus (H)    Assessment: continue home atorvastatin.     Diet: Moderate Consistent Carb (60 g CHO per Meal) Diet    DVT Prophylaxis: Enoxaparin (Lovenox) SQ  Ervin Catheter: Not present  Code Status: Full Code           Disposition Plan   Expected discharge: likely several more days pending improvement in hypoxia.   Entered: Louie Leung MD 11/18/2021, 7:00 AM       The patient's care was discussed with the multidisciplinary team.     Louie Leung MD  Hospitalist Service  [facility]  Contact information available via [facility] Paging/Directory    ______________________________________________________________________    Interval History    Feels well this morning  Rapid desaturation with sitting up and coughing.     -Data reviewed today: I reviewed all new labs and imaging results over the last 24 hours. I personally reviewed no images or EKG's today.    Physical Exam   Temp: 99.6  F (37.6  C) Temp src: Tympanic BP: 116/76 Pulse: 84   Resp: 28 SpO2: 94 % O2 Device: High Flow Nasal Cannula (HFNC) Oxygen Delivery: 8 LPM (Down from 9)  Vitals:    11/16/21 1358 11/17/21 0321 11/18/21 0300   Weight: 98.3 kg (216 lb 11.4 oz) 91.2 kg (201 lb 1.6 oz) 93.4 kg (206 lb)     Vital Signs with Ranges  Temp:  [97.5  F (36.4  C)-99.6  F (37.6  C)] 99.6  F (37.6  C)  Pulse:  [] 84  Resp:  [18-28] 28  BP: (116-133)/(68-77) 116/76  SpO2:  [75 %-95 %] 94 %  No intake/output data recorded.    Constitutional: Pleasant 60 year old man lying in bed in no acute distress.   Respiratory: Supplemental oxygen in place. Coarse breath sounds throughout.   Cardiovascular: regular rate and rhythm, no murmur, rubs or peripheral edema appreciated   GI: soft, nontener BS+  Skin/Integumen: normal       Medications       sodium chloride 0.9%  50 mL Intravenous Once     remdesivir  100 mg Intravenous Q24H    And     sodium chloride 0.9%  50 mL Intravenous Q24H     aspirin  81 mg Oral Daily     atorvastatin  10 mg Oral QPM     baricitinib  4 mg Oral Daily     dexamethasone  6 mg Oral Daily     enoxaparin ANTICOAGULANT  40 mg Subcutaneous Q24H     famotidine  20 mg Oral BID     insulin aspart  1-10 Units Subcutaneous TID AC     insulin aspart  1-5 Units Subcutaneous At Bedtime     insulin NPH  12 Units Subcutaneous QAM AC     pantoprazole  40 mg Oral QAM AC     sodium chloride (PF)  3 mL Intracatheter Q8H       Data   Recent Labs   Lab 11/17/21  0743 11/15/21  2357   WBC 8.2 6.6   HGB 13.2* 14.1   MCV 86 85    207    137   POTASSIUM 3.8 3.5   CHLORIDE 104 101   CO2 27 27   BUN 21 20   CR 0.81 0.89   ANIONGAP 8 9   DOMENIC 8.2* 8.7   * 267*   ALBUMIN  --  2.6*    PROTTOTAL  --  7.1   BILITOTAL  --  0.7   ALKPHOS  --  52   ALT  --  84*   AST  --  54*   LIPASE  --  184       No results found for this or any previous visit (from the past 24 hour(s)).

## 2021-11-18 NOTE — PROGRESS NOTES
SAFETY CHECKLIST  ID Bands and Risk clasps correct and in place (DNR, Fall risk, Allergy, Latex, Limb):  Yes  All Lines Reconciled and labeled correctly: Yes  Whiteboard updated:Yes  Environmental interventions (bed/chair alarm on, call light, side rails, restraints, sitter....): Yes   Kaye Chan RN on 11/17/2021 at 7:24 PM

## 2021-11-18 NOTE — PROGRESS NOTES
Shift Summary    Pt does very well and is self motivated to prone.  SpO2 increases significantly with proning.  I have been able to decrease pt from12 lpm to 10 lpm.  Will continue to encourage to prone and attempt to wean further.  Home CPAP is in room and set-up.    Mariana Benitez, RT on 11/18/2021 at 5:37 PM

## 2021-11-19 LAB
CREAT SERPL-MCNC: 0.76 MG/DL (ref 0.7–1.3)
CRP SERPL-MCNC: 75 MG/L
D DIMER PPP FEU-MCNC: 1 UG/ML FEU (ref 0–0.5)
FIBRINOGEN PPP-MCNC: 633 MG/DL (ref 170–490)
GFR SERPL CREATININE-BSD FRML MDRD: >90 ML/MIN/1.73M2
HGB BLD-MCNC: 13 G/DL (ref 13.3–17.7)
MAGNESIUM SERPL-MCNC: 2.2 MG/DL (ref 1.9–2.7)
PLATELET # BLD AUTO: 286 10E3/UL (ref 150–450)
POTASSIUM BLD-SCNC: 4.7 MMOL/L (ref 3.5–5.1)
SODIUM SERPL-SCNC: 136 MMOL/L (ref 134–144)

## 2021-11-19 PROCEDURE — 85018 HEMOGLOBIN: CPT | Performed by: STUDENT IN AN ORGANIZED HEALTH CARE EDUCATION/TRAINING PROGRAM

## 2021-11-19 PROCEDURE — 84132 ASSAY OF SERUM POTASSIUM: CPT | Performed by: STUDENT IN AN ORGANIZED HEALTH CARE EDUCATION/TRAINING PROGRAM

## 2021-11-19 PROCEDURE — 84295 ASSAY OF SERUM SODIUM: CPT | Performed by: STUDENT IN AN ORGANIZED HEALTH CARE EDUCATION/TRAINING PROGRAM

## 2021-11-19 PROCEDURE — 36415 COLL VENOUS BLD VENIPUNCTURE: CPT | Performed by: STUDENT IN AN ORGANIZED HEALTH CARE EDUCATION/TRAINING PROGRAM

## 2021-11-19 PROCEDURE — 258N000003 HC RX IP 258 OP 636: Performed by: STUDENT IN AN ORGANIZED HEALTH CARE EDUCATION/TRAINING PROGRAM

## 2021-11-19 PROCEDURE — 82565 ASSAY OF CREATININE: CPT | Performed by: STUDENT IN AN ORGANIZED HEALTH CARE EDUCATION/TRAINING PROGRAM

## 2021-11-19 PROCEDURE — 250N000012 HC RX MED GY IP 250 OP 636 PS 637: Performed by: STUDENT IN AN ORGANIZED HEALTH CARE EDUCATION/TRAINING PROGRAM

## 2021-11-19 PROCEDURE — 86140 C-REACTIVE PROTEIN: CPT | Performed by: STUDENT IN AN ORGANIZED HEALTH CARE EDUCATION/TRAINING PROGRAM

## 2021-11-19 PROCEDURE — 85384 FIBRINOGEN ACTIVITY: CPT | Performed by: STUDENT IN AN ORGANIZED HEALTH CARE EDUCATION/TRAINING PROGRAM

## 2021-11-19 PROCEDURE — 250N000009 HC RX 250: Performed by: STUDENT IN AN ORGANIZED HEALTH CARE EDUCATION/TRAINING PROGRAM

## 2021-11-19 PROCEDURE — 85049 AUTOMATED PLATELET COUNT: CPT | Performed by: STUDENT IN AN ORGANIZED HEALTH CARE EDUCATION/TRAINING PROGRAM

## 2021-11-19 PROCEDURE — 120N000001 HC R&B MED SURG/OB

## 2021-11-19 PROCEDURE — 250N000013 HC RX MED GY IP 250 OP 250 PS 637: Performed by: STUDENT IN AN ORGANIZED HEALTH CARE EDUCATION/TRAINING PROGRAM

## 2021-11-19 PROCEDURE — 85379 FIBRIN DEGRADATION QUANT: CPT | Performed by: STUDENT IN AN ORGANIZED HEALTH CARE EDUCATION/TRAINING PROGRAM

## 2021-11-19 PROCEDURE — 83735 ASSAY OF MAGNESIUM: CPT | Performed by: STUDENT IN AN ORGANIZED HEALTH CARE EDUCATION/TRAINING PROGRAM

## 2021-11-19 PROCEDURE — 99233 SBSQ HOSP IP/OBS HIGH 50: CPT | Performed by: STUDENT IN AN ORGANIZED HEALTH CARE EDUCATION/TRAINING PROGRAM

## 2021-11-19 PROCEDURE — 250N000011 HC RX IP 250 OP 636: Performed by: STUDENT IN AN ORGANIZED HEALTH CARE EDUCATION/TRAINING PROGRAM

## 2021-11-19 RX ADMIN — DEXAMETHASONE 6 MG: 2 TABLET ORAL at 09:20

## 2021-11-19 RX ADMIN — BARICITINIB 4 MG: 2 TABLET, FILM COATED ORAL at 09:20

## 2021-11-19 RX ADMIN — FAMOTIDINE 20 MG: 20 TABLET ORAL at 21:13

## 2021-11-19 RX ADMIN — ATORVASTATIN CALCIUM 10 MG: 10 TABLET, FILM COATED ORAL at 21:16

## 2021-11-19 RX ADMIN — ASPIRIN 81 MG: 81 TABLET, COATED ORAL at 09:20

## 2021-11-19 RX ADMIN — PANTOPRAZOLE SODIUM 40 MG: 40 TABLET, DELAYED RELEASE ORAL at 09:20

## 2021-11-19 RX ADMIN — FAMOTIDINE 20 MG: 20 TABLET ORAL at 09:20

## 2021-11-19 RX ADMIN — REMDESIVIR 100 MG: 100 INJECTION, POWDER, LYOPHILIZED, FOR SOLUTION INTRAVENOUS at 15:59

## 2021-11-19 RX ADMIN — ENOXAPARIN SODIUM 40 MG: 40 INJECTION SUBCUTANEOUS at 21:12

## 2021-11-19 ASSESSMENT — ACTIVITIES OF DAILY LIVING (ADL)
ADLS_ACUITY_SCORE: 9
ADLS_ACUITY_SCORE: 9
ADLS_ACUITY_SCORE: 10
ADLS_ACUITY_SCORE: 9
ADLS_ACUITY_SCORE: 10
ADLS_ACUITY_SCORE: 9
ADLS_ACUITY_SCORE: 10
ADLS_ACUITY_SCORE: 9
ADLS_ACUITY_SCORE: 9
ADLS_ACUITY_SCORE: 10
ADLS_ACUITY_SCORE: 9
ADLS_ACUITY_SCORE: 10
ADLS_ACUITY_SCORE: 9

## 2021-11-19 ASSESSMENT — MIFFLIN-ST. JEOR: SCORE: 1702.11

## 2021-11-19 NOTE — PLAN OF CARE
Problem: Adult Inpatient Plan of Care  Goal: Absence of Hospital-Acquired Illness or Injury  Intervention: Identify and Manage Fall Risk  Recent Flowsheet Documentation  Taken 11/19/2021 0814 by Marguerite Feliciano, RN  Safety Promotion/Fall Prevention: safety round/check completed   Patient has been pronging between meals. When in prone position sa02 in high 90's on 10 liters.  Appetite fair at meals. Denies pain. Vital signs stable.

## 2021-11-19 NOTE — PROGRESS NOTES
Patient Remained on 10L NC throughout the night no changes made . Patient remained stable .    RT Christian on 11/19/2021 at 5:50 AM

## 2021-11-19 NOTE — PLAN OF CARE
Patient vital signs stable. He is proning between meals. Remains on 9.5 liters. When he is prone his sa02 is in high 90's. When supine it goes down to 92%. Denies pain. Up to bathroom independent.

## 2021-11-19 NOTE — PROGRESS NOTES
"Patient continues to require O2  Continues to have nonproductive cough. Ambulated to bathroom sats checked patient sats down to 85% was able to recover quickly. No further issues. Did speak with pts wife and answered her questions.    Vital signs:  Temp: 99.1  F (37.3  C) Temp src: Tympanic BP: 128/79 Pulse: 72   Resp: 18 SpO2: 91 % O2 Device: High Flow Nasal Cannula (HFNC) Oxygen Delivery: 10 LPM Height: 175.3 cm (5' 9\") Weight: 91.2 kg (201 lb)  Estimated body mass index is 29.68 kg/m  as calculated from the following:    Height as of this encounter: 1.753 m (5' 9\").    Weight as of this encounter: 91.2 kg (201 lb).   Kaye Chan RN on 11/19/2021 at 5:13 AM           "

## 2021-11-19 NOTE — PROGRESS NOTES
SAFETY CHECKLIST  ID Bands and Risk clasps correct and in place (DNR, Fall risk, Allergy, Latex, Limb):  Yes  All Lines Reconciled and labeled correctly: Yes  Whiteboard updated:Yes  Environmental interventions (bed/chair alarm on, call light, side rails, restraints, sitter....): Yes  Kaye Chan RN on 11/18/2021 at 7:17 PM

## 2021-11-19 NOTE — PROGRESS NOTES
[facility]    Hospitalist Progress Note      Assessment & Plan   Junior Castellanos is a 60 year old man with a past medical history of type II diabetes, JAGUAR on CPAP, and hyperlipidemia who presents with Diarrhea, nausea and COVID Pneumonia.     Acute respiratory failure with hypoxemia (H)    JAGUAR on CPAP   Infection due to 2019 novel coronavirus    Assessment: Covid positive since 11/9/21. 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 Oxygen needs rapidly increased at time of admit, initiated baracitinib after informed discussion with the patient. Since stabilized and weaning as able. Inflammatory markers down-trending. Still with significant desaturations with coughing and movement and requiring about 10L but symptomatically feeling well. Likely will need to discharge on home oxygen as hospital course prolongs.    Plan:   -self proning as able  -supplemental O2, goal sats >90%  -dexamethasone 6mg daily  -baricitinib 4mg daily  -lovenox for dvt ppx   -remdesivir             Type 2 diabetes mellitus without complication, without long-term current use of insulin (H)    Assessment: On metformin only at home. Last A1C 7.6 on Admission. Hyperglycemic with steroids, difficult to control. Increase NPH to BID and start scheduled mealtime aspart.     Plan:   -QID AC HS glucose checks  -High corrective scale daily  -5 units aspart with meals  -Medium corrective scale at bedtime  -diabetic diet  -NPH 16 units BID          Hyperlipidemia associated with type 2 diabetes mellitus (H)    Assessment: continue home atorvastatin.     Diet: Moderate Consistent Carb (60 g CHO per Meal) Diet    DVT Prophylaxis: Enoxaparin (Lovenox) SQ  Ervin Catheter: Not present  Code Status: Full Code           Disposition Plan   Expected discharge: likely several more days pending improvement in hypoxia.   Entered: Louie Leung MD 11/19/2021, 7:43 AM       The patient's care was discussed with the  multidisciplinary team.     Louie Leung MD  Hospitalist Service  [facility]  Contact information available via [facility] Paging/Directory    ______________________________________________________________________    Interval History   Feels well this morning  Continues to desaturate with ambulation and moving.     -Data reviewed today: I reviewed all new labs and imaging results over the last 24 hours. I personally reviewed no images or EKG's today.    Physical Exam   Temp: 99.1  F (37.3  C) Temp src: Tympanic BP: 128/79 Pulse: 72   Resp: 18 SpO2: (!) 86 % O2 Device: High Flow Nasal Cannula (HFNC) Oxygen Delivery: 10 LPM  Vitals:    11/17/21 0321 11/18/21 0300 11/19/21 0234   Weight: 91.2 kg (201 lb 1.6 oz) 93.4 kg (206 lb) 91.2 kg (201 lb)     Vital Signs with Ranges  Temp:  [98.1  F (36.7  C)-99.1  F (37.3  C)] 99.1  F (37.3  C)  Pulse:  [72-97] 72  Resp:  [18-26] 18  BP: (128-149)/(69-79) 128/79  SpO2:  [86 %-95 %] 86 %  No intake/output data recorded.    Constitutional: Pleasant 60 year old man lying in bed in no acute distress.   Respiratory: Supplemental oxygen in place. Coarse breath sounds throughout.   Cardiovascular: regular rate and rhythm, no murmur, rubs or peripheral edema appreciated   GI: soft, nontener BS+  Skin/Integumen: normal       Medications       remdesivir  100 mg Intravenous Q24H    And     sodium chloride 0.9%  50 mL Intravenous Q24H     aspirin  81 mg Oral Daily     atorvastatin  10 mg Oral QPM     baricitinib  4 mg Oral Daily     dexamethasone  6 mg Oral Daily     enoxaparin ANTICOAGULANT  40 mg Subcutaneous Q24H     famotidine  20 mg Oral BID     insulin aspart  1-10 Units Subcutaneous TID AC     insulin aspart  1-5 Units Subcutaneous At Bedtime     insulin NPH  16 Units Subcutaneous BID AC     pantoprazole  40 mg Oral QAM AC     sodium chloride (PF)  3 mL Intracatheter Q8H       Data   Recent Labs   Lab 11/19/21  0527 11/18/21  0650 11/17/21  0785 11/15/21  3791    WBC  --  7.6 8.2 6.6   HGB 13.0* 12.4* 13.2* 14.1   MCV  --  86 86 85    260 258 207    137 139 137   POTASSIUM 4.7 4.5 3.8 3.5   CHLORIDE  --  103 104 101   CO2  --  25 27 27   BUN  --  20 21 20   CR 0.76 0.73 0.81 0.89   ANIONGAP  --  9 8 9   DOMENIC  --  7.9* 8.2* 8.7   GLC  --  284* 231* 267*   ALBUMIN  --   --   --  2.6*   PROTTOTAL  --   --   --  7.1   BILITOTAL  --   --   --  0.7   ALKPHOS  --   --   --  52   ALT  --  41  --  84*   AST  --  26  --  54*   LIPASE  --   --   --  184       No results found for this or any previous visit (from the past 24 hour(s)).

## 2021-11-20 LAB
CRP SERPL-MCNC: 45 MG/L
D DIMER PPP FEU-MCNC: 1.54 UG/ML FEU (ref 0–0.5)
FIBRINOGEN PPP-MCNC: 552 MG/DL (ref 170–490)

## 2021-11-20 PROCEDURE — 85379 FIBRIN DEGRADATION QUANT: CPT | Performed by: STUDENT IN AN ORGANIZED HEALTH CARE EDUCATION/TRAINING PROGRAM

## 2021-11-20 PROCEDURE — 258N000003 HC RX IP 258 OP 636: Performed by: STUDENT IN AN ORGANIZED HEALTH CARE EDUCATION/TRAINING PROGRAM

## 2021-11-20 PROCEDURE — 85384 FIBRINOGEN ACTIVITY: CPT | Performed by: STUDENT IN AN ORGANIZED HEALTH CARE EDUCATION/TRAINING PROGRAM

## 2021-11-20 PROCEDURE — 36415 COLL VENOUS BLD VENIPUNCTURE: CPT | Performed by: STUDENT IN AN ORGANIZED HEALTH CARE EDUCATION/TRAINING PROGRAM

## 2021-11-20 PROCEDURE — 250N000011 HC RX IP 250 OP 636: Performed by: STUDENT IN AN ORGANIZED HEALTH CARE EDUCATION/TRAINING PROGRAM

## 2021-11-20 PROCEDURE — 86140 C-REACTIVE PROTEIN: CPT | Performed by: STUDENT IN AN ORGANIZED HEALTH CARE EDUCATION/TRAINING PROGRAM

## 2021-11-20 PROCEDURE — 999N000157 HC STATISTIC RCP TIME EA 10 MIN

## 2021-11-20 PROCEDURE — 99233 SBSQ HOSP IP/OBS HIGH 50: CPT | Performed by: STUDENT IN AN ORGANIZED HEALTH CARE EDUCATION/TRAINING PROGRAM

## 2021-11-20 PROCEDURE — 250N000012 HC RX MED GY IP 250 OP 636 PS 637: Performed by: STUDENT IN AN ORGANIZED HEALTH CARE EDUCATION/TRAINING PROGRAM

## 2021-11-20 PROCEDURE — 250N000013 HC RX MED GY IP 250 OP 250 PS 637: Performed by: STUDENT IN AN ORGANIZED HEALTH CARE EDUCATION/TRAINING PROGRAM

## 2021-11-20 PROCEDURE — 120N000001 HC R&B MED SURG/OB

## 2021-11-20 PROCEDURE — 250N000009 HC RX 250: Performed by: STUDENT IN AN ORGANIZED HEALTH CARE EDUCATION/TRAINING PROGRAM

## 2021-11-20 RX ADMIN — DEXAMETHASONE 6 MG: 2 TABLET ORAL at 10:27

## 2021-11-20 RX ADMIN — ENOXAPARIN SODIUM 40 MG: 40 INJECTION SUBCUTANEOUS at 22:48

## 2021-11-20 RX ADMIN — FAMOTIDINE 20 MG: 20 TABLET ORAL at 22:48

## 2021-11-20 RX ADMIN — SODIUM CHLORIDE 50 ML: 9 INJECTION, SOLUTION INTRAVENOUS at 16:04

## 2021-11-20 RX ADMIN — REMDESIVIR 100 MG: 100 INJECTION, POWDER, LYOPHILIZED, FOR SOLUTION INTRAVENOUS at 16:04

## 2021-11-20 RX ADMIN — FAMOTIDINE 20 MG: 20 TABLET ORAL at 10:27

## 2021-11-20 RX ADMIN — PANTOPRAZOLE SODIUM 40 MG: 40 TABLET, DELAYED RELEASE ORAL at 10:27

## 2021-11-20 RX ADMIN — ATORVASTATIN CALCIUM 10 MG: 10 TABLET, FILM COATED ORAL at 19:56

## 2021-11-20 RX ADMIN — BARICITINIB 4 MG: 2 TABLET, FILM COATED ORAL at 10:26

## 2021-11-20 RX ADMIN — ASPIRIN 81 MG: 81 TABLET, COATED ORAL at 10:27

## 2021-11-20 ASSESSMENT — ACTIVITIES OF DAILY LIVING (ADL)
ADLS_ACUITY_SCORE: 10
ADLS_ACUITY_SCORE: 9
ADLS_ACUITY_SCORE: 10
ADLS_ACUITY_SCORE: 9
ADLS_ACUITY_SCORE: 10
ADLS_ACUITY_SCORE: 9
ADLS_ACUITY_SCORE: 9
ADLS_ACUITY_SCORE: 10

## 2021-11-20 ASSESSMENT — MIFFLIN-ST. JEOR: SCORE: 1692.58

## 2021-11-20 NOTE — PLAN OF CARE
Patient Sp02 >90% on 8L while at rest, when up moving or eating increased oxygen to 9L, does have some desats into the 80s but recovers with no increase in oxygen over 9L.  Patient denies any pain.  Independent in room.  VSS, afebrile.

## 2021-11-20 NOTE — PROGRESS NOTES
Pt used home CPAP with O2 @ 5lpm bled in at times during the night switching to HFNC @ 8lpm. No PRN Albuterol MDI required. Pt proning as denia. Continue to monitor pt for any further changes in resp status.

## 2021-11-20 NOTE — PLAN OF CARE
Problem: Gas Exchange Impaired (Pulmonary Impairment)  Goal: Optimal Gas Exchange  Outcome: No Change   Patient has remained on 9 liters today. Sa02 decreases with activity to high 80's. Recovers with rest. Patient has been laying prone or on his side between meals. Vitals signs stable. Denies pain. Appetite good.

## 2021-11-20 NOTE — PLAN OF CARE
Problem: Adult Inpatient Plan of Care  Goal: Absence of Hospital-Acquired Illness or Injury  Intervention: Prevent and Manage VTE (Venous Thromboembolism) Risk  Recent Flowsheet Documentation  Taken 11/20/2021 0800 by Marguerite Feliciano RN  VTE Prevention/Management:   ambulation promoted   anticoagulant therapy maintained   fluids promoted   Patient up to ambulate to bathroom. SA02 drops with activity, does return above 90% with rest. Vital signs stable. Appetite good at breakfast. Denies pain.

## 2021-11-20 NOTE — PROGRESS NOTES
[facility]    Hospitalist Progress Note      Assessment & Plan   Junior Castellanos is a 60 year old man with a past medical history of type II diabetes, JAGUAR on CPAP, and hyperlipidemia who presents with Diarrhea, nausea and COVID Pneumonia.     Acute respiratory failure with hypoxemia (H)    JAGUAR on CPAP   Infection due to 2019 novel coronavirus    Assessment: Covid positive since 11/9/21. 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 Oxygen needs rapidly increased at time of admit, initiated baracitinib after informed discussion with the patient. Since stabilized and weaning as able. Inflammatory markers down-trending. Still with significant desaturations with coughing and movement and requiring about 10L but symptomatically feeling well. Likely will need to discharge on home oxygen as hospital course prolongs.    Plan:   -self proning as able  -supplemental O2, goal sats >90%  -dexamethasone 6mg daily  -baricitinib 4mg daily  -lovenox for dvt ppx   -remdesivir  -famotidine for stress ulcer ppx with high steroids           Type 2 diabetes mellitus without complication, without long-term current use of insulin (H)    Assessment: On metformin only at home. Last A1C 7.6 on Admission. Hyperglycemic with steroids, difficult to control. Continue titrating insulin     Plan:   -QID AC HS glucose checks  -High corrective scale daily  -7 units aspart TID with meals  -Medium corrective scale at bedtime  -diabetic diet  -NPH 19 units BID          Hyperlipidemia associated with type 2 diabetes mellitus (H)    Assessment: continue home atorvastatin, aspirin    Diet: Moderate Consistent Carb (60 g CHO per Meal) Diet    DVT Prophylaxis: Enoxaparin (Lovenox) SQ  Ervin Catheter: Not present  Code Status: Full Code           Disposition Plan   Expected discharge: likely 2-3 more days, may be a candidate for home O2 versus [facility].   Entered: Louie Leung MD 11/20/2021, 7:34 AM       The  patient's care was discussed with the multidisciplinary team.     Louie Leung MD  Hospitalist Service  [facility]  Contact information available via [facility] Paging/Directory    ______________________________________________________________________    Interval History   Feels well this morning  Continues to desaturate with ambulation and moving.     -Data reviewed today: I reviewed all new labs and imaging results over the last 24 hours. I personally reviewed no images or EKG's today.    Physical Exam   Temp: 98.1  F (36.7  C) Temp src: Tympanic BP: 127/69 Pulse: 59   Resp: 20 SpO2: 92 % O2 Device: BiPAP/CPAP (HOME CPAP) Oxygen Delivery: 5 LPM  Vitals:    11/18/21 0300 11/19/21 0234 11/20/21 0236   Weight: 93.4 kg (206 lb) 91.2 kg (201 lb) 90.2 kg (198 lb 14.4 oz)     Vital Signs with Ranges  Temp:  [98  F (36.7  C)-99.3  F (37.4  C)] 98.1  F (36.7  C)  Pulse:  [59-82] 59  Resp:  [16-20] 20  BP: (127-147)/(69-82) 127/69  SpO2:  [90 %-97 %] 92 %  No intake/output data recorded.    Constitutional: Pleasant 60 year old man lying in bed in no acute distress.   Respiratory: Supplemental oxygen in place. Coarse breath sounds throughout.   Cardiovascular: regular rate and rhythm, no murmur, rubs or peripheral edema appreciated   GI: soft, nontener BS+  Skin/Integumen: normal       Medications       remdesivir  100 mg Intravenous Q24H    And     sodium chloride 0.9%  50 mL Intravenous Q24H     aspirin  81 mg Oral Daily     atorvastatin  10 mg Oral QPM     baricitinib  4 mg Oral Daily     dexamethasone  6 mg Oral Daily     enoxaparin ANTICOAGULANT  40 mg Subcutaneous Q24H     famotidine  20 mg Oral BID     insulin aspart  5 Units Subcutaneous TID w/meals     insulin aspart  1-10 Units Subcutaneous TID AC     insulin aspart  1-5 Units Subcutaneous At Bedtime     insulin NPH  16 Units Subcutaneous BID AC     pantoprazole  40 mg Oral QAM AC     sodium chloride (PF)  3 mL Intracatheter Q8H       Data    Recent Labs   Lab 11/19/21  0527 11/18/21  0650 11/17/21  0743 11/15/21  2357   WBC  --  7.6 8.2 6.6   HGB 13.0* 12.4* 13.2* 14.1   MCV  --  86 86 85    260 258 207    137 139 137   POTASSIUM 4.7 4.5 3.8 3.5   CHLORIDE  --  103 104 101   CO2  --  25 27 27   BUN  --  20 21 20   CR 0.76 0.73 0.81 0.89   ANIONGAP  --  9 8 9   DOMENIC  --  7.9* 8.2* 8.7   GLC  --  284* 231* 267*   ALBUMIN  --   --   --  2.6*   PROTTOTAL  --   --   --  7.1   BILITOTAL  --   --   --  0.7   ALKPHOS  --   --   --  52   ALT  --  41  --  84*   AST  --  26  --  54*   LIPASE  --   --   --  184       No results found for this or any previous visit (from the past 24 hour(s)).

## 2021-11-20 NOTE — PLAN OF CARE
Patient is now sitting up in bed. His oxygen is at six liters at rest and he is 95-96%. Vital signs stable. Patient denies pain. He is doing his IS. Appetite fair at meals.

## 2021-11-21 LAB
CRP SERPL-MCNC: 64 MG/L
D DIMER PPP FEU-MCNC: 2.65 UG/ML FEU (ref 0–0.5)
FIBRINOGEN PPP-MCNC: 567 MG/DL (ref 170–490)

## 2021-11-21 PROCEDURE — 250N000013 HC RX MED GY IP 250 OP 250 PS 637: Performed by: STUDENT IN AN ORGANIZED HEALTH CARE EDUCATION/TRAINING PROGRAM

## 2021-11-21 PROCEDURE — 36415 COLL VENOUS BLD VENIPUNCTURE: CPT | Performed by: STUDENT IN AN ORGANIZED HEALTH CARE EDUCATION/TRAINING PROGRAM

## 2021-11-21 PROCEDURE — 250N000011 HC RX IP 250 OP 636: Performed by: STUDENT IN AN ORGANIZED HEALTH CARE EDUCATION/TRAINING PROGRAM

## 2021-11-21 PROCEDURE — 85379 FIBRIN DEGRADATION QUANT: CPT | Performed by: STUDENT IN AN ORGANIZED HEALTH CARE EDUCATION/TRAINING PROGRAM

## 2021-11-21 PROCEDURE — 250N000012 HC RX MED GY IP 250 OP 636 PS 637: Performed by: STUDENT IN AN ORGANIZED HEALTH CARE EDUCATION/TRAINING PROGRAM

## 2021-11-21 PROCEDURE — 85384 FIBRINOGEN ACTIVITY: CPT | Performed by: STUDENT IN AN ORGANIZED HEALTH CARE EDUCATION/TRAINING PROGRAM

## 2021-11-21 PROCEDURE — 120N000001 HC R&B MED SURG/OB

## 2021-11-21 PROCEDURE — 999N000157 HC STATISTIC RCP TIME EA 10 MIN

## 2021-11-21 PROCEDURE — 99233 SBSQ HOSP IP/OBS HIGH 50: CPT | Performed by: STUDENT IN AN ORGANIZED HEALTH CARE EDUCATION/TRAINING PROGRAM

## 2021-11-21 PROCEDURE — 86140 C-REACTIVE PROTEIN: CPT | Performed by: STUDENT IN AN ORGANIZED HEALTH CARE EDUCATION/TRAINING PROGRAM

## 2021-11-21 RX ADMIN — DEXAMETHASONE 6 MG: 2 TABLET ORAL at 10:05

## 2021-11-21 RX ADMIN — BARICITINIB 4 MG: 2 TABLET, FILM COATED ORAL at 10:05

## 2021-11-21 RX ADMIN — ENOXAPARIN SODIUM 40 MG: 40 INJECTION SUBCUTANEOUS at 21:52

## 2021-11-21 RX ADMIN — ATORVASTATIN CALCIUM 10 MG: 10 TABLET, FILM COATED ORAL at 20:32

## 2021-11-21 RX ADMIN — FAMOTIDINE 20 MG: 20 TABLET ORAL at 21:52

## 2021-11-21 RX ADMIN — ASPIRIN 81 MG: 81 TABLET, COATED ORAL at 10:06

## 2021-11-21 RX ADMIN — PANTOPRAZOLE SODIUM 40 MG: 40 TABLET, DELAYED RELEASE ORAL at 10:06

## 2021-11-21 RX ADMIN — FAMOTIDINE 20 MG: 20 TABLET ORAL at 10:06

## 2021-11-21 ASSESSMENT — ACTIVITIES OF DAILY LIVING (ADL)
ADLS_ACUITY_SCORE: 9

## 2021-11-21 ASSESSMENT — MIFFLIN-ST. JEOR: SCORE: 1681.24

## 2021-11-21 NOTE — PLAN OF CARE
Patient rested comfortably this shift. O2 stable on 6 LPM via nasal cannula while awake. Stable on home CPAP machine while sleeping.  Sats decrease with activity. Patient recovers without intervention. LS diminished. Voiding without difficulty.      Problem: Adult Inpatient Plan of Care  Goal: Plan of Care Review  Outcome: Improving  Goal: Patient-Specific Goal (Individualized)  Outcome: Improving  Goal: Absence of Hospital-Acquired Illness or Injury  Outcome: Improving  Intervention: Identify and Manage Fall Risk  Recent Flowsheet Documentation  Taken 11/21/2021 0217 by Cheryl Rodriguez RN  Safety Promotion/Fall Prevention: safety round/check completed  Taken 11/20/2021 1930 by Cheryl Rodriguez RN  Safety Promotion/Fall Prevention: safety round/check completed  Intervention: Prevent Skin Injury  Recent Flowsheet Documentation  Taken 11/21/2021 0217 by Cheryl Rodriguez RN  Body Position: position changed independently  Taken 11/20/2021 1930 by Cheryl Rodriguez RN  Body Position: position changed independently  Intervention: Prevent and Manage VTE (Venous Thromboembolism) Risk  Recent Flowsheet Documentation  Taken 11/21/2021 0217 by Cheryl Rodriguez RN  VTE Prevention/Management:   ambulation promoted   anticoagulant therapy maintained   fluids promoted  Taken 11/20/2021 1930 by Cheryl Rodrgiuez RN  VTE Prevention/Management:   ambulation promoted   anticoagulant therapy maintained   fluids promoted  Intervention: Prevent Infection  Recent Flowsheet Documentation  Taken 11/21/2021 0217 by Cheryl Rodriguez RN  Infection Prevention:   equipment surfaces disinfected   hand hygiene promoted   personal protective equipment utilized   rest/sleep promoted   single patient room provided   visitors restricted/screened  Taken 11/20/2021 1930 by Cheryl Rodriguez RN  Infection Prevention:   equipment surfaces disinfected   hand hygiene promoted   personal protective equipment utilized   rest/sleep promoted    single patient room provided   visitors restricted/screened  Goal: Optimal Comfort and Wellbeing  Outcome: Improving  Goal: Readiness for Transition of Care  Outcome: Improving     Problem: Activity Intolerance (Pulmonary Impairment)  Goal: Improved Activity Tolerance  Outcome: Improving     Problem: Gas Exchange Impaired (Pulmonary Impairment)  Goal: Optimal Gas Exchange  Outcome: Improving     Problem: Diabetes Comorbidity  Goal: Blood Glucose Level Within Targeted Range  Outcome: Improving

## 2021-11-21 NOTE — PROGRESS NOTES
Addendum: glucoses improved without increasing insulin, continue NPH 19 Units BID, 7 units aspart with meals.     Louie Leung MD on 11/21/2021 at 12:03 PM      [facility]    Hospitalist Progress Note      Assessment & Plan   Junior Castellanos is a 60 year old man with a past medical history of type II diabetes, JAGUAR on CPAP, and hyperlipidemia who presents with Diarrhea, nausea and COVID Pneumonia.     Acute respiratory failure with hypoxemia (H)    JAGUAR on CPAP   Infection due to 2019 novel coronavirus    Assessment: Covid positive since 11/9/21. 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 Oxygen needs rapidly increased at time of admit, initiated baracitinib after informed discussion with the patient. Since stabilized and weaning as able. Inflammatory markers down-trending.  Likely will need to discharge on home oxygen as hospital course prolongs and oxygen needs continue to decline    Plan:   -self proning as able  -supplemental O2, goal sats >90%  -dexamethasone 6mg daily  -baricitinib 4mg daily  -lovenox for dvt ppx   -remdesivir course complete  -famotidine for stress ulcer ppx with  steroids           Type 2 diabetes mellitus without complication, without long-term current use of insulin (H)    Assessment: On metformin only at home. Last A1C 7.6 on Admission. Hyperglycemic with steroids, difficult to control. Continue titrating insulin.     Plan:   -QID AC HS glucose checks  -High corrective scale daily  -9 units aspart TID with meals  -Medium corrective scale at bedtime  -diabetic diet  -Insulin NPH 22 units BID  -hold home metformin          Hyperlipidemia associated with type 2 diabetes mellitus (H)    Assessment: continue home atorvastatin, aspirin    Diet: Moderate Consistent Carb (60 g CHO per Meal) Diet    DVT Prophylaxis: Enoxaparin (Lovenox) SQ  Ervin Catheter: Not present  Code Status: Full Code           Disposition Plan   Expected discharge: likely 1-2 more  days, may be a candidate for home O2 if oxygen needs stabilize  Entered: Louie Leung MD 11/21/2021, 11:27 AM       The patient's care was discussed with the multidisciplinary team.     Louie Leung MD  Hospitalist Service  [facility]  Contact information available via [facility] Paging/Directory    ______________________________________________________________________    Interval History   Oxygen needs continue to improve  Feeling better  No chest pain  Blood sugars still elevated    -Data reviewed today: I reviewed all new labs and imaging results over the last 24 hours. I personally reviewed no images or EKG's today.    Physical Exam   Temp: 99  F (37.2  C) Temp src: Tympanic BP: 120/73 Pulse: 89   Resp: 18 SpO2: 91 % O2 Device: Other (Comments) (inline O2 @ 4lpm bled into Home CPAP) Oxygen Delivery: 6 LPM  Vitals:    11/19/21 0234 11/20/21 0236 11/21/21 0217   Weight: 91.2 kg (201 lb) 90.2 kg (198 lb 14.4 oz) 89.1 kg (196 lb 6.4 oz)     Vital Signs with Ranges  Temp:  [97.4  F (36.3  C)-99  F (37.2  C)] 99  F (37.2  C)  Pulse:  [68-90] 89  Resp:  [18-24] 18  BP: (116-130)/(68-73) 120/73  SpO2:  [87 %-97 %] 91 %  I/O last 3 completed shifts:  In: 1660 [P.O.:1360; I.V.:300]  Out: -     Constitutional: Pleasant 60 year old man lying in bed in no acute distress.   Respiratory: Supplemental oxygen in place. Coarse breath sounds throughout.   Cardiovascular: regular rate and rhythm, no murmur, rubs or peripheral edema appreciated   GI: soft, nontener BS+  Skin/Integumen: normal       Medications       sodium chloride 0.9%  50 mL Intravenous Q24H     aspirin  81 mg Oral Daily     atorvastatin  10 mg Oral QPM     baricitinib  4 mg Oral Daily     dexamethasone  6 mg Oral Daily     enoxaparin ANTICOAGULANT  40 mg Subcutaneous Q24H     famotidine  20 mg Oral BID     insulin aspart  7 Units Subcutaneous TID w/meals     insulin aspart  1-10 Units Subcutaneous TID AC     insulin aspart  1-5 Units  Subcutaneous At Bedtime     insulin NPH  19 Units Subcutaneous BID AC     pantoprazole  40 mg Oral QAM AC     sodium chloride (PF)  3 mL Intracatheter Q8H       Data   Recent Labs   Lab 11/19/21  0527 11/18/21  0650 11/17/21  0743 11/15/21  2357   WBC  --  7.6 8.2 6.6   HGB 13.0* 12.4* 13.2* 14.1   MCV  --  86 86 85    260 258 207    137 139 137   POTASSIUM 4.7 4.5 3.8 3.5   CHLORIDE  --  103 104 101   CO2  --  25 27 27   BUN  --  20 21 20   CR 0.76 0.73 0.81 0.89   ANIONGAP  --  9 8 9   DOMENIC  --  7.9* 8.2* 8.7   GLC  --  284* 231* 267*   ALBUMIN  --   --   --  2.6*   PROTTOTAL  --   --   --  7.1   BILITOTAL  --   --   --  0.7   ALKPHOS  --   --   --  52   ALT  --  41  --  84*   AST  --  26  --  54*   LIPASE  --   --   --  184       No results found for this or any previous visit (from the past 24 hour(s)).

## 2021-11-21 NOTE — PLAN OF CARE
Patient has been holding sa02 in mid 90's on 6 liters of oxygen. Oxygen level decreases with activity. But returns to mid 90's with rest. Patient denies pain. Resp even non-labored, cough remains non-productive.

## 2021-11-21 NOTE — PROGRESS NOTES
Improving Covid status. Pt currently on HFNC @ 6lpm at rest and using home CPAP with O2 @ 4lpm bled in at night. No PRN Albuterol MDI required. Continue to monitor pt for any further changes in resp status.

## 2021-11-22 VITALS
RESPIRATION RATE: 18 BRPM | TEMPERATURE: 98.5 F | BODY MASS INDEX: 28.96 KG/M2 | OXYGEN SATURATION: 93 % | DIASTOLIC BLOOD PRESSURE: 72 MMHG | HEIGHT: 69 IN | WEIGHT: 195.55 LBS | SYSTOLIC BLOOD PRESSURE: 116 MMHG | HEART RATE: 97 BPM

## 2021-11-22 LAB
CREAT SERPL-MCNC: 0.73 MG/DL (ref 0.7–1.3)
CRP SERPL-MCNC: 79 MG/L
D DIMER PPP FEU-MCNC: 1.72 UG/ML FEU (ref 0–0.5)
FIBRINOGEN PPP-MCNC: 585 MG/DL (ref 170–490)
GFR SERPL CREATININE-BSD FRML MDRD: >90 ML/MIN/1.73M2
HOLD SPECIMEN: NORMAL
PLATELET # BLD AUTO: 362 10E3/UL (ref 150–450)
POTASSIUM BLD-SCNC: 4.9 MMOL/L (ref 3.5–5.1)
SODIUM SERPL-SCNC: 132 MMOL/L (ref 134–144)

## 2021-11-22 PROCEDURE — 86140 C-REACTIVE PROTEIN: CPT | Performed by: STUDENT IN AN ORGANIZED HEALTH CARE EDUCATION/TRAINING PROGRAM

## 2021-11-22 PROCEDURE — 84295 ASSAY OF SERUM SODIUM: CPT | Performed by: STUDENT IN AN ORGANIZED HEALTH CARE EDUCATION/TRAINING PROGRAM

## 2021-11-22 PROCEDURE — 85384 FIBRINOGEN ACTIVITY: CPT | Performed by: STUDENT IN AN ORGANIZED HEALTH CARE EDUCATION/TRAINING PROGRAM

## 2021-11-22 PROCEDURE — 250N000013 HC RX MED GY IP 250 OP 250 PS 637: Performed by: STUDENT IN AN ORGANIZED HEALTH CARE EDUCATION/TRAINING PROGRAM

## 2021-11-22 PROCEDURE — 82565 ASSAY OF CREATININE: CPT | Performed by: STUDENT IN AN ORGANIZED HEALTH CARE EDUCATION/TRAINING PROGRAM

## 2021-11-22 PROCEDURE — 36415 COLL VENOUS BLD VENIPUNCTURE: CPT | Performed by: STUDENT IN AN ORGANIZED HEALTH CARE EDUCATION/TRAINING PROGRAM

## 2021-11-22 PROCEDURE — 99239 HOSP IP/OBS DSCHRG MGMT >30: CPT | Performed by: FAMILY MEDICINE

## 2021-11-22 PROCEDURE — 85049 AUTOMATED PLATELET COUNT: CPT | Performed by: STUDENT IN AN ORGANIZED HEALTH CARE EDUCATION/TRAINING PROGRAM

## 2021-11-22 PROCEDURE — 85379 FIBRIN DEGRADATION QUANT: CPT | Performed by: STUDENT IN AN ORGANIZED HEALTH CARE EDUCATION/TRAINING PROGRAM

## 2021-11-22 PROCEDURE — 250N000012 HC RX MED GY IP 250 OP 636 PS 637: Performed by: STUDENT IN AN ORGANIZED HEALTH CARE EDUCATION/TRAINING PROGRAM

## 2021-11-22 PROCEDURE — 84132 ASSAY OF SERUM POTASSIUM: CPT | Performed by: STUDENT IN AN ORGANIZED HEALTH CARE EDUCATION/TRAINING PROGRAM

## 2021-11-22 RX ORDER — METFORMIN HCL 500 MG
1000 TABLET, EXTENDED RELEASE 24 HR ORAL 2 TIMES DAILY WITH MEALS
Qty: 120 TABLET | Refills: 2 | Status: SHIPPED | OUTPATIENT
Start: 2021-11-22 | End: 2022-04-07 | Stop reason: DRUGHIGH

## 2021-11-22 RX ORDER — DEXAMETHASONE 6 MG/1
6 TABLET ORAL DAILY
Qty: 3 TABLET | Refills: 0 | Status: SHIPPED | OUTPATIENT
Start: 2021-11-23 | End: 2022-04-07

## 2021-11-22 RX ADMIN — ASPIRIN 81 MG: 81 TABLET, COATED ORAL at 09:58

## 2021-11-22 RX ADMIN — FAMOTIDINE 20 MG: 20 TABLET ORAL at 09:58

## 2021-11-22 RX ADMIN — PANTOPRAZOLE SODIUM 40 MG: 40 TABLET, DELAYED RELEASE ORAL at 09:58

## 2021-11-22 RX ADMIN — DEXAMETHASONE 6 MG: 2 TABLET ORAL at 09:58

## 2021-11-22 ASSESSMENT — MIFFLIN-ST. JEOR: SCORE: 1677.38

## 2021-11-22 ASSESSMENT — ACTIVITIES OF DAILY LIVING (ADL)
ADLS_ACUITY_SCORE: 9

## 2021-11-22 NOTE — DISCHARGE SUMMARY
[facility]  Hospitalist Discharge Summary      Date of Admission:  11/16/2021  Date of Discharge:  11/22/2021  Discharging Provider: Aviva Rodgers DO    Discharge Diagnoses   Acute respiratory failure with hypoxemia  Pneumonia due to 2019 novel coronavirus  JAGUAR on CPAP  Type II diabetes mellitus  Hyperlipidemia associated with type II diabetes mellitus    Follow-ups Needed After Discharge   Follow-up Appointments     Follow-up and recommended labs and tests       Follow up with primary care provider, FOREIGN AVILEZ, on 11/23 @ 1 PM   for hospital follow-up.           Discharge Disposition   Discharged to home  Condition at discharge: Stable    Hospital Course   Junior Castellanos is a 60 year old man with past medical history of type II diabetes, JAGUAR on CPAP, and hyperlipidemia who presented with diarrhea, nausea, and COVID-19 positive pneumonia.    Acute respiratory failure with hypoxemia, Pneumonia due to 2019 novel coronavirus, JAGUAR on CPAP:  Tested positive for COVID on 11/19.  He had evidence of hypoxic encephalopathy at home which improved with oxygen therapy.  His oxygen needs rapidly increased at the time of his admission, triggering treatment with Remdesivir, Dexamethasone, and Baricitinib.  His oxygen was able to be weaned to 3 L/min via nasal cannula on day of discharge.  He was sent home with oxygen therapy to continue wean outpatient.    Type II diabetes mellitus, Hyperlipidemia associated with type II diabetes mellitus:  Receiving therapy with Metformin only prior to admission.  Hgb A1c 7.6%.  His blood sugars were difficult to control on steroid therapy, requiring insulin administration.  He was discharged on Metformin 1000 mg BID but will need follow-up on his glycemic control and possible diabetes education.  Statin medication was continued without adjustment.    Consultations This Hospital Stay   None    Code Status   Full Code    Time Spent on this Encounter   Aviva VIZCAINO  DO Ana Maria, personally saw the patient today and spent greater than 30 minutes discharging this patient.       Aviva Rodgers DO  [facility]  [address]  [place] [address]  Phone: [phone]  Fax: [phone]  ______________________________________________________________________    Physical Exam   Vital Signs: Temp: 98.5  F (36.9  C) Temp src: Tympanic BP: 116/72 Pulse: 97   Resp: 18 SpO2: 93 % O2 Device: Nasal cannula Oxygen Delivery: 3 LPM  Weight: 195 lbs 8.77 oz  General Appearance: Alert. No acute distress.  Respiratory: Normal rate and effort. Coarse breath sounds throughout. NC in place, 3 L/min O2.  Cardiovascular: RRR.  GI: Soft. Non-tender. Normal bowel sounds.  Extremities: No lower extremity swelling or edema.  Psych: Normal mood and affect.       Primary Care Physician   FOREIGN AVILEZ    Discharge Orders      Reason for your hospital stay    Acute respiratory failure secondary to pneumonia from COVID-19 infection     Follow-up and recommended labs and tests     Follow up with primary care provider, FOREIGN AVILEZ, on 11/23 @ 1 PM for hospital follow-up.     Activity    Your activity upon discharge: activity as tolerated     Oxygen Adult/Peds    Oxygen Documentation:   I certify that this patient, Junior Castellanos has been under my care (or a nurse practitioner or physican's assistant working with me). This is the face-to-face encounter for oxygen medical necessity.      Junior Castellanos is now in a chronic stable state and continues to require supplemental oxygen. Patient has continued oxygen desaturation due to COVID-19 pneumonia.    Alternative treatment(s) tried or considered and deemed clinically infective for treatment of COVID-19 pneumonia include nebulizers, inhalers, steroids, and Remdesivir and Baricitinib.  If portability is ordered, is the patient mobile within the home? yes     Diet    Follow this diet upon discharge: Moderate Consistent Carb (60  g CHO per Meal) Diet     Significant Results and Procedures   Most Recent 3 CBC's:Recent Labs   Lab Test 11/22/21  0401 11/19/21  0527 11/18/21  0650 11/17/21  0743 11/15/21  2357   WBC  --   --  7.6 8.2 6.6   HGB  --  13.0* 12.4* 13.2* 14.1   MCV  --   --  86 86 85    286 260 258 207     Most Recent 3 BMP's:Recent Labs   Lab Test 11/22/21  0401 11/19/21  0527 11/18/21  0650 11/17/21  0743 11/15/21  2357   * 136 137 139 137   POTASSIUM 4.9 4.7 4.5 3.8 3.5   CHLORIDE  --   --  103 104 101   CO2  --   --  25 27 27   BUN  --   --  20 21 20   CR 0.73 0.76 0.73 0.81 0.89   ANIONGAP  --   --  9 8 9   DOMENIC  --   --  7.9* 8.2* 8.7   GLC  --   --  284* 231* 267*   ,   Results for orders placed or performed during the hospital encounter of 11/15/21   XR Chest Port 1 View    Narrative    PROCEDURE: XR CHEST PORT 1 VIEW 11/16/2021 12:16 AM    HISTORY: sob    COMPARISONS: 11/7/2019.    TECHNIQUE: Single portable view.    FINDINGS: Heart is stable in size. There are new patchy bilateral lung  infiltrates in mid and lower lungs. There is no pleural effusion.         Impression    IMPRESSION: Patchy infiltrates consistent with multifocal pneumonia.    GERONIMO MALDONADO MD         SYSTEM ID:  [id number]   CTA Chest with Contrast    Narrative    PROCEDURE: CTA CHEST WITH CONTRAST 11/16/2021 9:20 AM    HISTORY: PE suspected, low/intermediate prob, positive D-dimer.   hypoxemia    COMPARISONS: None.    Meds/Dose Given: ISOVUE 370 68ml    TECHNIQUE: CT angiogram of the chest with sagittal and coronal MIPS  reconstructions    FINDINGS: There are no pulmonary emboli. There is mild pericardial  thickening or effusion which with maximal thickness of 7 mm. The heart  is normal in size. Thoracic aorta appears normal.    Widespread pulmonary parenchymal opacities are noted throughout both  lungs consistent with viral pneumonia. The hilar and mediastinal lymph  nodes are normal in caliber. Axillary and supraclavicular lymph  nodes  appear normal. There are small bilateral pleural effusions. The upper  portion of the liver spleen and pancreas appear normal. There are some  calcifications seen in the gallbladder wall.         Impression    IMPRESSION: No pulmonary emboli. Widespread pulmonary parenchymal  opacities consistent with pneumonia    NEDA BARRON MD         SYSTEM ID:  [id number]     Discharge Medications   Current Discharge Medication List      START taking these medications    Details   dexamethasone (DECADRON) 6 MG tablet Take 1 tablet (6 mg) by mouth daily for 3 days  Qty: 3 tablet, Refills: 0    Associated Diagnoses: Acute respiratory failure with hypoxemia (H); Infection due to 2019 novel coronavirus         CONTINUE these medications which have CHANGED    Details   metFORMIN (GLUCOPHAGE-XR) 500 MG 24 hr tablet Take 2 tablets (1,000 mg) by mouth 2 times daily (with meals)  Qty: 120 tablet, Refills: 2    Associated Diagnoses: Type 2 diabetes mellitus without complication, without long-term current use of insulin (H)         CONTINUE these medications which have NOT CHANGED    Details   aspirin 81 MG EC tablet Take 81 mg by mouth daily      atorvastatin (LIPITOR) 10 MG tablet Take 10 mg by mouth every evening   Refills: 10      omeprazole (PRILOSEC) 40 MG DR capsule Take 40 mg by mouth daily       UNABLE TO FIND MEDICATION NAME: Multivitamin plant base liquid, 2 Tbsp daily      Vitamin D, Cholecalciferol, 10 MCG (400 UNIT) TABS Take 800 Units by mouth daily      CONTOUR NEXT TEST test strip USE TO TEST BLOOD SUGAR TWICE DAILY OR AS DIRECTED  Qty: 200 strip, Refills: 3    Associated Diagnoses: New onset type 2 diabetes mellitus (H); Type 2 diabetes mellitus with hyperglycemia, without long-term current use of insulin (H)      Microlet Lancets MISC Use to test twice a day  Qty: 100 each, Refills: 11    Comments: Patient will call to order  Associated Diagnoses: Type 2 diabetes mellitus with hyperglycemia, without long-term  current use of insulin (H)         STOP taking these medications       famotidine (PEPCID) 20 MG tablet Comments:   Reason for Stopping:         ondansetron (ZOFRAN ODT) 4 MG ODT tab Comments:   Reason for Stopping:         predniSONE (DELTASONE) 20 MG tablet Comments:   Reason for Stopping:             Allergies   No Known Allergies     Oxygen Documentation:   I certify that this patient, Junior Castellanos has been under my care (or a nurse practitioner or physican's assistant working with me). This is the face-to-face encounter for oxygen medical necessity.      Junior Castellanos is now in a chronic stable state and continues to require supplemental oxygen. Patient has continued oxygen desaturation due to COVID-19 pneumonia.    Alternative treatment(s) tried or considered and deemed clinically infective for treatment of COVID-19 pneumonia include nebulizers, inhalers, steroids, and Remdesivir and Baricitinib.  If portability is ordered, is the patient mobile within the home? yes

## 2021-11-22 NOTE — PROGRESS NOTES
[facility] DISCHARGE NOTE    Patient discharged to home at 2:10 PM via wheel chair. Accompanied by spouse and staff. Discharge instructions reviewed with patient, opportunity offered to ask questions. Prescriptions sent to patients preferred pharmacy. All belongings sent with patient.    Marguerite Feliciano RN

## 2021-11-22 NOTE — PROGRESS NOTES
Pt stable. Compliant with CPAP Therapy with O2 @ 4lpm bled in. Pt remains on O2 @ 5lpm via NC during the day. No PRN Albuterol MDI required. Continue to titrate O2 as denia.

## 2021-11-22 NOTE — PROGRESS NOTES
Clinical Nutrition/Initial Assessment     Reason for Assessment:   Length of stay    Assessment:   Client History:  Pt with acute respiratory failure, COVID 19. Appetite has been fair.  Diet Order:  Consistent carb  Oral Intake:  100% of 2 meals recorded. per notes, appetite has been fair.     Intervention:  Continue with carb controlled diet    Monitoring and Evaluation:   Intakes, diet tolerance.     Discharge Recommendation:   Nutrition Discharge Planning  do not anticipate nutrition interventions on discharge.    Follow up weekly. If needed sooner, please order consult.   Jaclyn Gallagher RD on 11/22/2021 at 10:08 AM

## 2021-11-22 NOTE — PLAN OF CARE
Problem: Activity Intolerance (Pulmonary Impairment)  Goal: Improved Activity Tolerance  Outcome: Improving     Problem: Gas Exchange Impaired (Pulmonary Impairment)  Goal: Optimal Gas Exchange  Outcome: Improving     Problem: Diabetes Comorbidity  Goal: Blood Glucose Level Within Targeted Range  Outcome: Improving       Patient denies shortness of breath or dyspnea with exertion. Infrequent cough. Patient's oxygen saturation >94% on 3 LPM via n/c. Home CPAP in use at this time. O2 remains above >90%. LS clear, diminished. Independent in room. VSS.

## 2021-11-22 NOTE — PHARMACY - DISCHARGE MEDICATION RECONCILIATION AND EDUCATION
Pharmacy:  Discharge Counseling and Medication Reconciliation    Junior Castellanos  [address] [place] [address]  [phone] (home)   60 year old male  PCP: Amilcar Ureña    Allergies: Patient has no known allergies.    Discharge Counseling:    Pharmacist met with patient (and/or family) today to review the medication portion of the After Visit Summary (with an emphasis on NEW medications) and to address patient's questions/concerns.    Summary of Education: educated on indication, duration and side effects of dexamethasone. Educated to increase metformin and discontinue zofran, prednisone and famotidine.     Materials Provided:  MedCounselor sheets printed from Clinical Pharmacology on: dexamethasone    Discharge Medication Reconciliation:    It has been determined that the patient has an adequate supply of medications available or which can be obtained from the patient's preferred pharmacy, which HE/SHE has confirmed as: 's.    Thank you for the consult.    Damaris Holguin [facility]........November 22, 2021 11:29 AM

## 2021-11-22 NOTE — PLAN OF CARE
Patient is planning to discharge home. He is maintaining his oxygen on 3 liters. His appetite is good. Denies pain.

## 2021-11-22 NOTE — PROGRESS NOTES
SAFETY CHECKLIST  ID Bands and Risk clasps correct and in place (DNR, Fall risk, Allergy, Latex, Limb):  Yes  All Lines Reconciled and labeled correctly: Yes  Whiteboard updated:Yes  Environmental interventions (bed/chair alarm on, call light, side rails, restraints, sitter....): Yes   Verify Tele #:

## 2021-11-22 NOTE — PLAN OF CARE
Problem: Activity Intolerance (Pulmonary Impairment)  Goal: Improved Activity Tolerance  Outcome: Improving   Patient is doing well on 3 liters of oxygen. He does de sat with activity and then recovers. Appetite good at meals.  Vital signs stable.

## 2021-11-22 NOTE — DISCHARGE INSTRUCTIONS
Follow up with Adelita on 12/29 @ [facility]..Luisana Choe on 11/22/2021 at 11:28 AM

## 2021-11-23 ENCOUNTER — PATIENT OUTREACH (OUTPATIENT)
Dept: CARE COORDINATION | Facility: CLINIC | Age: 61
End: 2021-11-23
Payer: COMMERCIAL

## 2021-11-23 NOTE — PROGRESS NOTES
Clinic Care Coordination Contact    Patient has PCP elsewhere, no follow-up here. No TCM call required per policy.  Karissa Akins RN ,....................  11/23/2021   7:49 AM

## 2021-11-24 ENCOUNTER — TELEPHONE (OUTPATIENT)
Dept: FAMILY MEDICINE | Facility: OTHER | Age: 61
End: 2021-11-24
Payer: COMMERCIAL

## 2021-11-24 NOTE — TELEPHONE ENCOUNTER
Good morning Dr. Rodgers. I received a request from [facility] in [place] regarding Humalin N 100units/ML Kwikpen and Novolin N. I don't see anywhere in patient's chart where the patient is currently using these medications. The request states that neither on of these is covered by insurance. Please advise. Thank you.  Prabha Mora on 11/24/2021 at 7:15 AM

## 2021-11-24 NOTE — TELEPHONE ENCOUNTER
Prabha, this patient has never been seen here regarding his medications. Will need to schedule with a provider to get medication.  Radha Cochran LPN ....................  11/24/2021   9:34 AM

## 2021-12-29 ENCOUNTER — APPOINTMENT (OUTPATIENT)
Dept: LAB | Facility: HOSPITAL | Age: 61
End: 2021-12-29
Payer: COMMERCIAL

## 2021-12-29 ENCOUNTER — LAB REQUISITION (OUTPATIENT)
Dept: LAB | Facility: HOSPITAL | Age: 61
End: 2021-12-29
Payer: COMMERCIAL

## 2021-12-29 ENCOUNTER — MEDICAL CORRESPONDENCE (OUTPATIENT)
Dept: CT IMAGING | Facility: HOSPITAL | Age: 61
End: 2021-12-29

## 2021-12-29 DIAGNOSIS — J12.82 PNEUMONIA DUE TO CORONAVIRUS DISEASE 2019 (CODE): ICD-10-CM

## 2021-12-29 LAB
D DIMER PPP FEU-MCNC: 0.53 UG/ML FEU (ref 0–0.5)
NT-PROBNP SERPL-MCNC: 25 PG/ML (ref 0–125)
TROPONIN I SERPL HS-MCNC: 5 NG/L

## 2021-12-29 PROCEDURE — 83880 ASSAY OF NATRIURETIC PEPTIDE: CPT | Performed by: FAMILY MEDICINE

## 2021-12-29 PROCEDURE — 84484 ASSAY OF TROPONIN QUANT: CPT | Performed by: FAMILY MEDICINE

## 2021-12-29 PROCEDURE — 85379 FIBRIN DEGRADATION QUANT: CPT | Performed by: FAMILY MEDICINE

## 2021-12-30 ENCOUNTER — HOSPITAL ENCOUNTER (OUTPATIENT)
Dept: CT IMAGING | Facility: HOSPITAL | Age: 61
Discharge: HOME | End: 2021-12-30
Attending: FAMILY MEDICINE | Admitting: FAMILY MEDICINE
Payer: COMMERCIAL

## 2021-12-30 DIAGNOSIS — J96.11 CHRONIC RESPIRATORY FAILURE WITH HYPOXIA (H): ICD-10-CM

## 2021-12-30 DIAGNOSIS — U07.1 PNEUMONIA DUE TO COVID-19 VIRUS: ICD-10-CM

## 2021-12-30 DIAGNOSIS — J12.82 PNEUMONIA DUE TO COVID-19 VIRUS: ICD-10-CM

## 2021-12-30 LAB
CREAT BLD-MCNC: 0.7 MG/DL (ref 0.7–1.3)
GFR SERPL CREATININE-BSD FRML MDRD: >60 ML/MIN/1.73M2

## 2021-12-30 PROCEDURE — 250N000011 HC RX IP 250 OP 636: Performed by: RADIOLOGY

## 2021-12-30 PROCEDURE — 82565 ASSAY OF CREATININE: CPT

## 2021-12-30 PROCEDURE — 71275 CT ANGIOGRAPHY CHEST: CPT

## 2021-12-30 RX ORDER — IOPAMIDOL 755 MG/ML
66 INJECTION, SOLUTION INTRAVASCULAR ONCE
Status: COMPLETED | OUTPATIENT
Start: 2021-12-30 | End: 2021-12-30

## 2021-12-30 RX ADMIN — IOPAMIDOL 66 ML: 755 INJECTION, SOLUTION INTRAVENOUS at 09:19

## 2022-02-25 DIAGNOSIS — E11.9 TYPE 2 DIABETES MELLITUS WITHOUT COMPLICATION, WITHOUT LONG-TERM CURRENT USE OF INSULIN (H): ICD-10-CM

## 2022-02-25 RX ORDER — METFORMIN HCL 500 MG
TABLET, EXTENDED RELEASE 24 HR ORAL
Qty: 360 TABLET | Refills: 0 | OUTPATIENT
Start: 2022-02-25

## 2022-02-25 NOTE — TELEPHONE ENCOUNTER
's [facility] sent Rx request for the following:      Requested Prescriptions   Pending Prescriptions Disp Refills     metFORMIN (GLUCOPHAGE-XR) 500 MG 24 hr tablet [Pharmacy Med Name: METFORMIN HCL  MG TABLET] 360 tablet 0     Sig: TAKE 2 TABLETS BY MOUTH 2 TIMES DAILY WITH MEALS   Last Prescription Date:   11/22/21  Last Fill Qty/Refills:         120, R-2  Last Office Visit:              None  Future Office visit:           None  Routing refill request to provider for review/approval because:    Biguanide Agents Failed - 2/25/2022 11:23 AM        Failed - Recent (6 mo) or future (30 days) visit within the authorizing provider's specialty     No PCP at [facility]. ZULEMA Ureña at [facility]. Unable to complete prescription refill per RN Medication Refill Policy. Porsha Hahn RN .............. 2/25/2022  11:26 AM

## 2022-02-28 RX ORDER — METFORMIN HCL 500 MG
TABLET, EXTENDED RELEASE 24 HR ORAL
Qty: 360 TABLET | Refills: 0 | OUTPATIENT
Start: 2022-02-28

## 2022-03-07 DIAGNOSIS — E11.9 NEW ONSET TYPE 2 DIABETES MELLITUS (H): ICD-10-CM

## 2022-03-07 DIAGNOSIS — E11.65 TYPE 2 DIABETES MELLITUS WITH HYPERGLYCEMIA, WITHOUT LONG-TERM CURRENT USE OF INSULIN (H): ICD-10-CM

## 2022-03-08 NOTE — TELEPHONE ENCOUNTER
Test Strips      Last Written Prescription Date:  11/11/21  Last Fill Quantity: 200,   # refills: 3  Last Office Visit: ????  Future Office visit:       Routing refill request to provider for review/approval because:

## 2022-03-14 ENCOUNTER — TRANSFERRED RECORDS (OUTPATIENT)
Dept: HEALTH INFORMATION MANAGEMENT | Facility: HOSPITAL | Age: 62
End: 2022-03-14
Payer: COMMERCIAL

## 2022-03-14 LAB — RETINOPATHY: NORMAL

## 2022-03-20 ENCOUNTER — HEALTH MAINTENANCE LETTER (OUTPATIENT)
Age: 62
End: 2022-03-20

## 2022-04-04 DIAGNOSIS — E11.9 TYPE 2 DIABETES MELLITUS WITHOUT COMPLICATION, WITHOUT LONG-TERM CURRENT USE OF INSULIN (H): ICD-10-CM

## 2022-04-04 DIAGNOSIS — E11.65 TYPE 2 DIABETES MELLITUS WITH HYPERGLYCEMIA, WITHOUT LONG-TERM CURRENT USE OF INSULIN (H): ICD-10-CM

## 2022-04-04 DIAGNOSIS — E11.9 NEW ONSET TYPE 2 DIABETES MELLITUS (H): ICD-10-CM

## 2022-04-05 RX ORDER — METFORMIN HCL 500 MG
TABLET, EXTENDED RELEASE 24 HR ORAL
Qty: 360 TABLET | Refills: 0 | OUTPATIENT
Start: 2022-04-05

## 2022-04-05 RX ORDER — LANCETS
EACH MISCELLANEOUS
Qty: 100 EACH | Refills: 0 | Status: SHIPPED | OUTPATIENT
Start: 2022-04-05 | End: 2022-04-07

## 2022-04-05 NOTE — TELEPHONE ENCOUNTER
's [facility] sent Rx request for the following:      Requested Prescriptions   Pending Prescriptions Disp Refills     metFORMIN (GLUCOPHAGE-XR) 500 MG 24 hr tablet [Pharmacy Med Name: METFORMIN HCL  MG TABLET] 360 tablet 0     Sig: TAKE 2 TABLETS BY MOUTH 2 TIMES DAILY WITH MEALS       Biguanide Agents Failed - 4/5/2022  2:27 PM        Failed - Recent (6 mo) or future (30 days) visit within the authorizing provider's specialty     Last Prescription Date:   11/22/21  Last Fill Qty/Refills:         120, R-2      No PCP at [facility]. PCP Amilcar Ureña at [facility]. Refused, with note to pharmacy. Porsha Hahn RN .............. 4/5/2022  2:28 PM

## 2022-04-05 NOTE — TELEPHONE ENCOUNTER
Lancets      Last Written Prescription Date:  01/19/21  Last Fill Quantity: 100,   # refills: 11  Last Office Visit: 01/19/21  Future Office visit:         Test strips      Last Written Prescription Date:  03/08/22  Last Fill Quantity: 50,   # refills: 0  Last Office Visit: 01/19/21  Future Office visit:

## 2022-04-06 PROBLEM — J96.11 CHRONIC RESPIRATORY FAILURE WITH HYPOXIA (H): Status: ACTIVE | Noted: 2021-11-16

## 2022-04-07 ENCOUNTER — HOSPITAL ENCOUNTER (OUTPATIENT)
Dept: EDUCATION SERVICES | Facility: HOSPITAL | Age: 62
Discharge: HOME | End: 2022-04-07
Attending: NURSE PRACTITIONER | Admitting: FAMILY MEDICINE
Payer: COMMERCIAL

## 2022-04-07 VITALS
WEIGHT: 197.7 LBS | SYSTOLIC BLOOD PRESSURE: 135 MMHG | BODY MASS INDEX: 29.28 KG/M2 | DIASTOLIC BLOOD PRESSURE: 83 MMHG | OXYGEN SATURATION: 97 % | HEIGHT: 69 IN | HEART RATE: 85 BPM | RESPIRATION RATE: 16 BRPM

## 2022-04-07 DIAGNOSIS — E11.9 NEW ONSET TYPE 2 DIABETES MELLITUS (H): ICD-10-CM

## 2022-04-07 DIAGNOSIS — E11.65 TYPE 2 DIABETES MELLITUS WITH HYPERGLYCEMIA, WITHOUT LONG-TERM CURRENT USE OF INSULIN (H): ICD-10-CM

## 2022-04-07 PROCEDURE — G0108 DIAB MANAGE TRN  PER INDIV: HCPCS

## 2022-04-07 RX ORDER — METFORMIN HCL 500 MG
500 TABLET, EXTENDED RELEASE 24 HR ORAL 2 TIMES DAILY WITH MEALS
COMMUNITY
End: 2022-05-19

## 2022-04-07 RX ORDER — LANCETS
EACH MISCELLANEOUS
Qty: 100 EACH | Refills: 11 | Status: SHIPPED | OUTPATIENT
Start: 2022-04-07 | End: 2023-05-04

## 2022-04-07 ASSESSMENT — ANXIETY QUESTIONNAIRES
GAD7 TOTAL SCORE: 0
7. FEELING AFRAID AS IF SOMETHING AWFUL MIGHT HAPPEN: NOT AT ALL
4. TROUBLE RELAXING: NOT AT ALL
2. NOT BEING ABLE TO STOP OR CONTROL WORRYING: NOT AT ALL
1. FEELING NERVOUS, ANXIOUS, OR ON EDGE: NOT AT ALL
6. BECOMING EASILY ANNOYED OR IRRITABLE: NOT AT ALL
3. WORRYING TOO MUCH ABOUT DIFFERENT THINGS: NOT AT ALL
5. BEING SO RESTLESS THAT IT IS HARD TO SIT STILL: NOT AT ALL
IF YOU CHECKED OFF ANY PROBLEMS ON THIS QUESTIONNAIRE, HOW DIFFICULT HAVE THESE PROBLEMS MADE IT FOR YOU TO DO YOUR WORK, TAKE CARE OF THINGS AT HOME, OR GET ALONG WITH OTHER PEOPLE: NOT DIFFICULT AT ALL

## 2022-04-07 ASSESSMENT — PAIN SCALES - GENERAL: PAINLEVEL: NO PAIN (0)

## 2022-04-07 ASSESSMENT — PATIENT HEALTH QUESTIONNAIRE - PHQ9: SUM OF ALL RESPONSES TO PHQ QUESTIONS 1-9: 0

## 2022-04-07 NOTE — PROGRESS NOTES
"Diabetes Self-Management Education & Support    Presents for: Individual review    SUBJECTIVE/OBJECTIVE:  Presents for: Individual review  Accompanied by: Self  Diabetes education in the past 24mo: Yes  Focus of Visit: Monitoring, Taking Medication  Diabetes type: Type 2  Disease course: Stable  How confident are you filling out medical forms by yourself:: Quite a bit  Diabetes management related comments/concerns: no concerns, need prescriptions updated  Transportation concerns: No  Difficulty affording diabetes medication?: No  Difficulty affording diabetes testing supplies?: No  Other concerns:: None  Cultural Influences/Ethnic Background:  Not  or     Diabetes Symptoms & Complications:  Fatigue: Sometimes  Neuropathy: Sometimes  Polydipsia: No  Polyphagia: No  Polyuria: Sometimes  Visual change: No  Slow healing wounds: No  Weight trend: Stable  Complications assessed today?: Yes  Autonomic neuropathy: No  CVA: No  Heart disease: No  Nephropathy: No  Peripheral neuropathy: No  Foot ulcerations: No  Peripheral Vascular Disease: No  Retinopathy: No  Sexual dysfunction: Yes    Patient Problem List and Family Medical History reviewed for relevant medical history, current medical status, and diabetes risk factors.    Vitals:  /83   Pulse 85   Resp 16   Ht 1.753 m (5' 9\")   Wt 89.7 kg (197 lb 11.2 oz)   SpO2 97%   BMI 29.20 kg/m    Estimated body mass index is 29.2 kg/m  as calculated from the following:    Height as of this encounter: 1.753 m (5' 9\").    Weight as of this encounter: 89.7 kg (197 lb 11.2 oz).   Last 3 BP:   BP Readings from Last 3 Encounters:   04/07/22 135/83   11/22/21 116/72   11/16/21 137/81       History   Smoking Status     Former Smoker     Types: Cigarettes   Smokeless Tobacco     Never Used       Labs:  Lab Results   Component Value Date    A1C 7.6 11/17/2021    A1C 7.8 01/17/2020     Lab Results   Component Value Date     11/18/2021     04/03/2020 "     Lab Results   Component Value Date    LDL 70 11/18/2019     HDL Cholesterol   Date Value Ref Range Status   11/18/2019 29 (L) 40 - 60 mg/dL Final   ]  GFR Estimate   Date Value Ref Range Status   04/03/2020 >90 >60 mL/min/[1.73_m2] Final     Comment:     Non  GFR Calc  Starting 12/18/2018, serum creatinine based estimated GFR (eGFR) will be   calculated using the Chronic Kidney Disease Epidemiology Collaboration   (CKD-EPI) equation.       GFR, ESTIMATED POCT   Date Value Ref Range Status   12/30/2021 >60 >60 mL/min/1.73m2 Final     GFR Estimate If Black   Date Value Ref Range Status   04/03/2020 >90 >60 mL/min/[1.73_m2] Final     Comment:      GFR Calc  Starting 12/18/2018, serum creatinine based estimated GFR (eGFR) will be   calculated using the Chronic Kidney Disease Epidemiology Collaboration   (CKD-EPI) equation.       Lab Results   Component Value Date    CR 0.7 12/30/2021    CR 0.73 11/22/2021    CR 0.76 04/03/2020     No results found for: MICROALBUMIN    Healthy Eating:  Cultural/[employer] diet restrictions?: No  Meal planning/habits: Avoiding sweets, Carb counting, Keeps food records  How many times a week on average do you eat food made away from home (restaurant/take-out)?: 0  Meals include: Breakfast, Dinner, Evening Snack  Breakfast: eggs, oatmeal  Lunch: tuna, hot dogs  Dinner: meat, vegetables or tacos, hamburgers  Beverages: Water, Coffee    Being Active:  Barrier to exercise: None    Monitoring:  Monitoring Assessed Today: Yes  Blood Glucose Meter: ContourNext  Times checking blood sugar at home (number): 2  Times checking blood sugar at home (per): Day  Blood glucose trend: Fluctuating        Taking Medications:  Diabetes Medication(s)     Biguanides       metFORMIN (GLUCOPHAGE-XR) 500 MG 24 hr tablet    Take 500 mg by mouth 2 times daily (with meals)          Current Treatments: Diet, Oral Medication (taken by mouth)  Problems taking diabetes medications  regularly?: No  Diabetes medication side effects?: No    Problem Solving:  Is the patient at risk for hypoglycemia?: No  Hypoglycemia Frequency: Never              Reducing Risks:  CAD Risks: Diabetes Mellitus, Sedentary lifestyle  Has dilated eye exam at least once a year?: Yes  Sees dentist every 6 months?: Yes  Feet checked by healthcare provider in the last year?: Yes    Healthy Coping:  Healthy Coping Assessed Today: Yes  Emotional response to diabetes: Confidence diabetes can be controlled  Informal Support system:: Family, Spouse  Stage of change: ACTION (Actively working towards change)  Support resources: None  Patient Activation Measure Survey Score:  No flowsheet data found.    Diabetes knowledge and skills assessment:   Patient is knowledgeable in diabetes management concepts related to: Healthy Eating, Being Active, Monitoring, Taking Medication and Reducing Risks    Patient needs further education on the following diabetes management concepts: Healthy Eating, Monitoring, Taking Medication and Reducing Risks    Based on learning assessment above, most appropriate setting for further diabetes education would be: Individual setting.      INTERVENTIONS:    Education provided today on:  AADE Self-Care Behaviors:  Diabetes Pathophysiology  Healthy Eating: consistency in amount, composition, and timing of food intake and portion control  Monitoring: purpose, proper technique, log and interpret results, individual blood glucose targets and frequency of monitoring  Taking Medication: action of prescribed medication, side effects of prescribed medications and when to take medications  Reducing Risks: prevention, early diagnostic measures and treatment of complications, foot care, annual eye exam, A1C - goals, relating to blood glucose levels, how often to check, lipids levels and goals and blood pressure and goals    Opportunities for ongoing education and support in diabetes-self management were  discussed.    Pt verbalized understanding of concepts discussed and recommendations provided today.       Education Materials Provided:  No new materials provided today      ASSESSMENT:  Readings range as follows: fastin-159 and post-supper: 130-150    Ward is doing well with his food plan. He continues to keep food logs.    Expect that he will continue to well.        Patient's most recent A1C is 6.1% on 2022  Lab Results   Component Value Date    A1C 7.6 2021    A1C 7.8 2020    is meeting goal of <7.0    PLAN  See Patient Instructions for co-developed, patient-stated behavior change goals.  Continue current plan.  Call with questions and concerns.  Follow up with Diabetes Ed annually  AVS printed and provided to patient today. See Follow-Up section for recommended follow-up.      Time Spent: 30 minutes  Encounter Type: Individual    Any diabetes medication dose changes were made via the CDE Protocol and Collaborative Practice Agreement with the patient's primary care provider. A copy of this encounter was shared with the provider.

## 2022-04-08 ASSESSMENT — ANXIETY QUESTIONNAIRES: GAD7 TOTAL SCORE: 0

## 2022-05-15 ENCOUNTER — HEALTH MAINTENANCE LETTER (OUTPATIENT)
Age: 62
End: 2022-05-15

## 2022-05-19 ENCOUNTER — TELEPHONE (OUTPATIENT)
Dept: EDUCATION SERVICES | Facility: HOSPITAL | Age: 62
End: 2022-05-19
Payer: COMMERCIAL

## 2022-05-19 DIAGNOSIS — E11.65 TYPE 2 DIABETES MELLITUS WITH HYPERGLYCEMIA, WITHOUT LONG-TERM CURRENT USE OF INSULIN (H): Primary | ICD-10-CM

## 2022-05-19 RX ORDER — METFORMIN HCL 500 MG
TABLET, EXTENDED RELEASE 24 HR ORAL
Qty: 360 TABLET | Refills: 0 | OUTPATIENT
Start: 2022-05-19

## 2022-05-19 RX ORDER — METFORMIN HCL 500 MG
500 TABLET, EXTENDED RELEASE 24 HR ORAL 2 TIMES DAILY WITH MEALS
Qty: 60 TABLET | Refills: 3 | Status: SHIPPED | OUTPATIENT
Start: 2022-05-19 | End: 2022-07-25

## 2022-05-19 NOTE — TELEPHONE ENCOUNTER
Pt called he needs a refill on his Metformin. Dr Ureña has retired and he has an appt with TEMITOPE Maya in 08/22. Refill has been denied. Can you help with this?

## 2022-05-19 NOTE — TELEPHONE ENCOUNTER
"No PCP at [facility]. PCP Amilcar Ureña at [facility]. Refused, with note to pharmacy. Khadra Riggins RN on 5/19/2022 at 9:42 AM       Requested Prescriptions   Pending Prescriptions Disp Refills     metFORMIN (GLUCOPHAGE XR) 500 MG 24 hr tablet [Pharmacy Med Name: METFORMIN HCL  MG TABLET] 360 tablet 0     Sig: TAKE 2 TABLETS BY MOUTH 2 TIMES DAILY WITH MEALS       Biguanide Agents Failed - 5/17/2022 10:19 AM        Failed - Patient has documented A1c within the specified period of time.     If HgbA1C is 8 or greater, it needs to be on file within the past 3 months.  If less than 8, must be on file within the past 6 months.     Recent Labs   Lab Test 11/17/21  0743   A1C 7.6*             Failed - Recent (6 mo) or future (30 days) visit within the authorizing provider's specialty     Patient had office visit in the last 6 months or has a visit in the next 30 days with authorizing provider or within the authorizing provider's specialty.  See \"Patient Info\" tab in inbasket, or \"Choose Columns\" in Meds & Orders section of the refill encounter.            Passed - Patient is age 10 or older        Passed - Patient's CR is NOT>1.4 OR Patient's EGFR is NOT<45 within past 12 mos.     Recent Labs   Lab Test 12/30/21  0920 11/15/21  2357 04/03/20  1922   GFRESTIMATED >60   < > >90   GFRESTBLACK  --   --  >90    < > = values in this interval not displayed.       Recent Labs   Lab Test 12/30/21  0920   CR 0.7             Passed - Patient does NOT have a diagnosis of CHF.        Passed - Medication is active on med list             "

## 2022-07-10 ENCOUNTER — HEALTH MAINTENANCE LETTER (OUTPATIENT)
Age: 62
End: 2022-07-10

## 2022-07-25 ENCOUNTER — HOSPITAL ENCOUNTER (EMERGENCY)
Facility: HOSPITAL | Age: 62
Discharge: HOME | End: 2022-07-25
Attending: NURSE PRACTITIONER | Admitting: NURSE PRACTITIONER
Payer: COMMERCIAL

## 2022-07-25 ENCOUNTER — TELEPHONE (OUTPATIENT)
Dept: FAMILY MEDICINE | Facility: OTHER | Age: 62
End: 2022-07-25

## 2022-07-25 ENCOUNTER — APPOINTMENT (OUTPATIENT)
Dept: CT IMAGING | Facility: HOSPITAL | Age: 62
End: 2022-07-25
Attending: NURSE PRACTITIONER
Payer: COMMERCIAL

## 2022-07-25 ENCOUNTER — HOSPITAL ENCOUNTER (OUTPATIENT)
Dept: EDUCATION SERVICES | Facility: HOSPITAL | Age: 62
Discharge: HOME | End: 2022-07-25
Attending: NURSE PRACTITIONER | Admitting: FAMILY MEDICINE
Payer: COMMERCIAL

## 2022-07-25 VITALS
HEART RATE: 75 BPM | RESPIRATION RATE: 16 BRPM | OXYGEN SATURATION: 97 % | WEIGHT: 206 LBS | DIASTOLIC BLOOD PRESSURE: 75 MMHG | SYSTOLIC BLOOD PRESSURE: 136 MMHG | HEIGHT: 69 IN | BODY MASS INDEX: 30.51 KG/M2

## 2022-07-25 VITALS
SYSTOLIC BLOOD PRESSURE: 134 MMHG | OXYGEN SATURATION: 97 % | HEART RATE: 86 BPM | DIASTOLIC BLOOD PRESSURE: 76 MMHG | RESPIRATION RATE: 16 BRPM | TEMPERATURE: 97.7 F

## 2022-07-25 DIAGNOSIS — E11.9 TYPE 2 DIABETES MELLITUS WITHOUT COMPLICATION, WITHOUT LONG-TERM CURRENT USE OF INSULIN (H): Primary | ICD-10-CM

## 2022-07-25 DIAGNOSIS — E11.65 TYPE 2 DIABETES MELLITUS WITH HYPERGLYCEMIA, WITHOUT LONG-TERM CURRENT USE OF INSULIN (H): Primary | ICD-10-CM

## 2022-07-25 DIAGNOSIS — K04.7 DENTAL INFECTION: ICD-10-CM

## 2022-07-25 LAB — HBA1C MFR BLD: 6.7 % (ref 0–5.7)

## 2022-07-25 PROCEDURE — 99213 OFFICE O/P EST LOW 20 MIN: CPT | Performed by: NURSE PRACTITIONER

## 2022-07-25 PROCEDURE — G0463 HOSPITAL OUTPT CLINIC VISIT: HCPCS

## 2022-07-25 PROCEDURE — 83036 HEMOGLOBIN GLYCOSYLATED A1C: CPT | Performed by: NURSE PRACTITIONER

## 2022-07-25 PROCEDURE — G0463 HOSPITAL OUTPT CLINIC VISIT: HCPCS | Mod: 25

## 2022-07-25 PROCEDURE — G0108 DIAB MANAGE TRN  PER INDIV: HCPCS

## 2022-07-25 PROCEDURE — 70486 CT MAXILLOFACIAL W/O DYE: CPT

## 2022-07-25 RX ORDER — METFORMIN HCL 500 MG
500 TABLET, EXTENDED RELEASE 24 HR ORAL 2 TIMES DAILY WITH MEALS
Qty: 60 TABLET | Refills: 4 | Status: SHIPPED | OUTPATIENT
Start: 2022-07-25 | End: 2023-01-17

## 2022-07-25 ASSESSMENT — PAIN SCALES - GENERAL: PAINLEVEL: NO PAIN (1)

## 2022-07-25 ASSESSMENT — ENCOUNTER SYMPTOMS
LIGHT-HEADEDNESS: 0
SINUS PAIN: 0
CHILLS: 0
FACIAL SWELLING: 1
FATIGUE: 0
EYES NEGATIVE: 1
DIZZINESS: 0
VOMITING: 0
DIARRHEA: 1
SHORTNESS OF BREATH: 0
COUGH: 0
HEADACHES: 0
SORE THROAT: 0
ACTIVITY CHANGE: 1
SINUS PRESSURE: 0
NECK PAIN: 0
NAUSEA: 0
FEVER: 0
RHINORRHEA: 0

## 2022-07-25 NOTE — ED PROVIDER NOTES
History     Chief Complaint   Patient presents with     Facial Swelling     HPI  Junior Castellanos is a 61 year old male who is accompanied per his wife.  He presents with a 3-week history of left-sided facial swelling and numbness.  No OTC medications have been taken.  Last dental appointment May, 2022.  Quit smoking 1990s.  Had COVID-vaccine booster July 5, 2022.  Denies neck pain, dizziness, headaches, and lightheadedness.  Denies fevers, chills, fatigue, nausea, and vomiting.    Allergies:  No Known Allergies    Problem List:    Patient Active Problem List    Diagnosis Date Noted     Infection due to 2019 novel coronavirus 11/16/2021     Priority: Medium     Chronic respiratory failure with hypoxia (H) 11/16/2021     Priority: Medium     Hyperlipidemia associated with type 2 diabetes mellitus (H) 01/17/2020     Priority: Medium     Type 2 diabetes mellitus without complication, without long-term current use of insulin (H) 11/18/2019     Priority: Medium     Uncontrolled         JAGUAR on CPAP      Priority: Medium        Past Medical History:    Past Medical History:   Diagnosis Date     JAGUAR on CPAP        Past Surgical History:    Past Surgical History:   Procedure Laterality Date     COLONOSCOPY N/A 5/20/2016    Procedure: COLONOSCOPY;  Surgeon: Mayo eFlix DO;  Location: [facility]     UPPER GI ENDOSCOPY         Family History:    History reviewed. No pertinent family history.    Social History:  Marital Status:   [2]  Social History     Tobacco Use     Smoking status: Former Smoker     Types: Cigarettes     Smokeless tobacco: Never Used   Substance Use Topics     Alcohol use: No     Alcohol/week: 0.0 standard drinks     Drug use: No        Medications:    amoxicillin-clavulanate (AUGMENTIN) 875-125 MG tablet  aspirin 81 MG EC tablet  atorvastatin (LIPITOR) 10 MG tablet  metFORMIN (GLUCOPHAGE XR) 500 MG 24 hr tablet  omeprazole (PRILOSEC) 40 MG DR capsule  UNABLE TO FIND  Vitamin D, Cholecalciferol, 10  MCG (400 UNIT) TABS  blood glucose (CONTOUR NEXT TEST) test strip  Microlet Lancets MISC          Review of Systems   Constitutional: Positive for activity change. Negative for chills, fatigue and fever.   HENT: Positive for dental problem and facial swelling. Negative for rhinorrhea, sinus pressure, sinus pain and sore throat.    Eyes: Negative.    Respiratory: Negative for cough and shortness of breath.    Gastrointestinal: Positive for diarrhea (chronic from metformin). Negative for nausea and vomiting.   Musculoskeletal: Negative for neck pain.   Neurological: Negative for dizziness, light-headedness and headaches.       Physical Exam   BP: 134/76  Pulse: 86  Temp: 97.7  F (36.5  C)  Resp: 16  SpO2: 97 %      Physical Exam  Vitals and nursing note reviewed.   Constitutional:       General: He is in acute distress (Mild to moderate).   HENT:      Head: Normocephalic.      Jaw: Tenderness, swelling and pain on movement present.        Right Ear: Tympanic membrane and ear canal normal.      Left Ear: Tympanic membrane and ear canal normal.      Nose: Nose normal.      Right Sinus: No maxillary sinus tenderness or frontal sinus tenderness.      Left Sinus: No maxillary sinus tenderness or frontal sinus tenderness.      Mouth/Throat:      Lips: Pink.      Mouth: Mucous membranes are moist.      Pharynx: Uvula midline. No posterior oropharyngeal erythema.   Eyes:      Conjunctiva/sclera: Conjunctivae normal.   Cardiovascular:      Rate and Rhythm: Normal rate and regular rhythm.      Heart sounds: Normal heart sounds. No murmur heard.  Pulmonary:      Effort: Pulmonary effort is normal. No respiratory distress.      Breath sounds: Normal breath sounds. No wheezing or rales.   Musculoskeletal:      Cervical back: No tenderness.   Lymphadenopathy:      Cervical: No cervical adenopathy.   Skin:     General: Skin is warm and dry.      Findings: No bruising or erythema.   Neurological:      Mental Status: He is alert and  oriented to person, place, and time.   Psychiatric:         Behavior: Behavior normal.         ED Course                 Procedures             Results for orders placed or performed during the hospital encounter of 07/25/22 (from the past 24 hour(s))   CT Facial Bones without Contrast    Narrative    PROCEDURE: CT FACIAL BONES WITHOUT CONTRAST 7/25/2022 12:49 PM    HISTORY: Left lower jaw swelling. Pain. No dental pain.    COMPARISONS: None.    Meds/Dose Given: None.    TECHNIQUE: Axial noncontrast enhanced images with coronal and sagittal  reformatted images.    FINDINGS: There is asymmetric soft tissue swelling over the left side  of the face at the site of the marker. There is some thickening of the  soft tissues with soft tissue stranding in the fat which suggests  inflammation. The underlying masseter muscle also appears slightly  heterogeneous and is slightly larger than on the contralateral side.    There is some focal periapical lucency adjacent to the root of what is  probably the second premolar. This is consistent with a periapical  abscess.    No fracture is seen. No soft tissue mass is seen. No significant lymph  node enlargement is seen.    There is somewhat limited evaluation for an abscess given lack of  intravenous contrast.         Impression    IMPRESSION:   1. Periapical lucency in the second premolar, consistent with a  periapical abscess.  2. Adjacent heterogeneity of the masseter muscle suggesting  inflammation in the muscle with changes in the subcutaneous fat  consistent inflammation as well. No definite abscess although no  intravenous contrast was administered.  3. Number of lymph nodes in the left submandibular region is increased  but they are not enlarged by size criteria.    GERONIMO MALDONADO MD         SYSTEM ID:  [id number]       Medications - No data to display    Assessments & Plan (with Medical Decision Making)     I have reviewed the nursing notes.    I have reviewed the findings,  diagnosis, plan and need for follow up with the patient.  (K04.7) Dental infection  Comment:  61 year old male who is accompanied per his wife.  He presents with a 3-week history of left-sided facial swelling and numbness.  No OTC medications have been taken.  Last dental appointment May, 2022.  Quit smoking 1990s.  Had COVID-vaccine booster July 5, 2022.  Denies neck pain, dizziness, headaches, and lightheadedness.  Denies fevers, chills, fatigue, nausea, and vomiting.    MDM:NHT. Lungs CTA  Moderate to severe left-sided facial swelling.  1.5 cm lump felt over left, lower anterior jaw.    CT scan of facial bones per radiology;  1. Periapical lucency in the second premolar, consistent with a  periapical abscess.  2. Adjacent heterogeneity of the masseter muscle suggesting  inflammation in the muscle with changes in the subcutaneous fat  consistent inflammation as well. No definite abscess although no  intravenous contrast was administered.  3. Number of lymph nodes in the left submandibular region is increased  but they are not enlarged by size criteria.     Plan: Augmentin twice daily for 7 days.  Education provided and/or discussed for this/these medication and for dental abscess with facial cellulitis.  Increase fluids.   -Complete all antibiotics even if feeling better.    -Taking antibiotics with food may decrease the symptoms, of an upset stomach, that can occur when taking antibiotics. Antibiotics frequently cause diarrhea. Probiotics or yogurt may help prevent or decrease these symptoms.   -Return to be reevaluated if symptoms do not improve, or worsen.    Apply a cold pack or ice compress for up to 20 minutes several times a day. This is to help reduce pain and relieve swelling. Cover it with a thin, dry cloth before putting it on your skin.    Rinse your mouth with warm saltwater several times per day. This will help reduce irritation, gum swelling, and pain.  Good oral hygiene is imperitive. Brush your at  "least twice a day. Use a fluoride toothpaste and soft-bristle toothbrush.  Eat a healthy diet that doesn t include many sugary foods and drinks.  Call [facility] to schedule an appointment to see a dentist.   Our  department can try to assist you if you are having difficulty finding a dentist, dental coverage, or paying for dental care.  You can reach them by calling [phone] and asking for .  Return to ED/UC if symptoms increase or concerns develop such as those discussed and listed on the \"When to go the Emergency Room\" portion of your discharge instructions.     These discharge instructions and medications were reviewed with him and his wife and understanding verbalized.    This document was prepared using a combination of typing and voice generated software.  While every attempt was made for accuracy, spelling and grammatical errors may exist.    Discharge Medication List as of 7/25/2022  1:19 PM      START taking these medications    Details   amoxicillin-clavulanate (AUGMENTIN) 875-125 MG tablet Take 1 tablet by mouth 2 times daily for 7 days, Disp-14 tablet, R-0, E-Prescribe             Final diagnoses:   Dental infection       7/25/2022   [facility]       Vesta Carrasco, CNP  07/25/22 0446    "

## 2022-07-25 NOTE — ED TRIAGE NOTES
Patient presents to emergency room with c/o left facial swelling and jaw pain. Symptoms started several weeks ago. C/o increased pain to left jaw with chewing and opening mouth. C/o feeling lumps under his skin along the left jaw line. Denies any dental pain or issues on the left side. Rates left jaw pain 1-2/10

## 2022-07-25 NOTE — ED TRIAGE NOTES
Patient presents to urgent care for facial swelling on the left side x several weeks. Patient states he has some lumps and swelling that prevents him from opening his mouth and causing him pain. Pain is 2/10 unless he opens his mouth the pain increases.  Patient states his jaw hurts when he tries to open his mouth.

## 2022-07-25 NOTE — TELEPHONE ENCOUNTER
Patient called to cancel his postop appointment with Dr. Munoz this Friday 4/28/17.  Said he could not make it because of being short staffed at work.  He said he is doing fine, no problems after the surgery.  I told him he should reschedule so Dr. Munoz can take a look at him postop.  He said he would call if things at work change.   Pt has an appt with [facility], please sign the pended referral. Pt is scheduled for an appt 11/22.  Jenny Morrison

## 2022-07-25 NOTE — DISCHARGE INSTRUCTIONS
"Augmentin as prescribed for infection.    -Increase fluids.   -Complete all antibiotics even if feeling better.    -Taking antibiotics with food may decrease the symptoms, of an upset stomach, that can occur when taking antibiotics. Antibiotics frequently cause diarrhea. Probiotics or yogurt may help prevent or decrease these symptoms.   -Return to be reevaluated if symptoms do not improve, or worsen.    Apply a cold pack or ice compress for up to 20 minutes several times a day. This is to help reduce pain and relieve swelling. Cover it with a thin, dry cloth before putting it on your skin.    Rinse your mouth with warm saltwater several times per day. This will help reduce irritation, gum swelling, and pain.  Good oral hygiene is imperitive. Brush your at least twice a day. Use a fluoride toothpaste and soft-bristle toothbrush.  Eat a healthy diet that doesn t include many sugary foods and drinks.  Call ASAP to schedule an appointment to see a dentist.   Our  department can try to assist you if you are having difficulty finding a dentist, dental coverage, or paying for dental care.  You can reach them by calling [phone] and asking for .  Return to ED/UC if symptoms increase or concerns develop such as those discussed and listed on the \"When to go the Emergency Room\" portion of your discharge instructions.       "

## 2022-07-26 ENCOUNTER — TELEPHONE (OUTPATIENT)
Dept: OTOLARYNGOLOGY | Facility: OTHER | Age: 62
End: 2022-07-26

## 2022-07-26 NOTE — TELEPHONE ENCOUNTER
Pt was in ER 12/25 with diagnosis of dental infection.   Pt went to dentist, the dentist said that it is not a dental issue but an ENT issue.  3 weeks of left sided facial swelling and numbness.  Please advise.

## 2022-07-27 ENCOUNTER — HOSPITAL ENCOUNTER (OUTPATIENT)
Dept: CT IMAGING | Facility: HOSPITAL | Age: 62
Discharge: HOME | End: 2022-07-27
Attending: NURSE PRACTITIONER
Payer: COMMERCIAL

## 2022-07-27 ENCOUNTER — TELEPHONE (OUTPATIENT)
Dept: OTOLARYNGOLOGY | Facility: OTHER | Age: 62
End: 2022-07-27

## 2022-07-27 ENCOUNTER — OFFICE VISIT (OUTPATIENT)
Dept: OTOLARYNGOLOGY | Facility: OTHER | Age: 62
End: 2022-07-27
Attending: NURSE PRACTITIONER
Payer: COMMERCIAL

## 2022-07-27 VITALS
HEART RATE: 89 BPM | TEMPERATURE: 97.3 F | OXYGEN SATURATION: 94 % | SYSTOLIC BLOOD PRESSURE: 132 MMHG | DIASTOLIC BLOOD PRESSURE: 62 MMHG | WEIGHT: 206 LBS | BODY MASS INDEX: 29.49 KG/M2 | HEIGHT: 70 IN

## 2022-07-27 DIAGNOSIS — R22.0 SWELLING, MASS, OR LUMP ON FACE: ICD-10-CM

## 2022-07-27 DIAGNOSIS — K04.7 PERIAPICAL ABSCESS WITH FACIAL INVOLVEMENT: Primary | ICD-10-CM

## 2022-07-27 DIAGNOSIS — L03.211 FACIAL CELLULITIS: ICD-10-CM

## 2022-07-27 PROCEDURE — G0463 HOSPITAL OUTPT CLINIC VISIT: HCPCS | Mod: 25

## 2022-07-27 PROCEDURE — 69210 REMOVE IMPACTED EAR WAX UNI: CPT | Performed by: NURSE PRACTITIONER

## 2022-07-27 PROCEDURE — 31575 DIAGNOSTIC LARYNGOSCOPY: CPT | Performed by: NURSE PRACTITIONER

## 2022-07-27 PROCEDURE — 82565 ASSAY OF CREATININE: CPT

## 2022-07-27 PROCEDURE — 70487 CT MAXILLOFACIAL W/DYE: CPT

## 2022-07-27 PROCEDURE — 99214 OFFICE O/P EST MOD 30 MIN: CPT | Mod: 25 | Performed by: NURSE PRACTITIONER

## 2022-07-27 PROCEDURE — 250N000011 HC RX IP 250 OP 636: Performed by: RADIOLOGY

## 2022-07-27 RX ORDER — IOPAMIDOL 755 MG/ML
75 INJECTION, SOLUTION INTRAVASCULAR ONCE
Status: COMPLETED | OUTPATIENT
Start: 2022-07-27 | End: 2022-07-27

## 2022-07-27 RX ADMIN — IOPAMIDOL 75 ML: 755 INJECTION, SOLUTION INTRAVENOUS at 13:44

## 2022-07-27 ASSESSMENT — PAIN SCALES - GENERAL: PAINLEVEL: MILD PAIN (2)

## 2022-07-27 NOTE — PATIENT INSTRUCTIONS
Thank you for allowing Luisana Machuca and our ENT team to participate in your care.  If your medications are too expensive, please give the nurse a call.  We can possibly change this medication.  If you have a scheduling or an appointment question please contact our Health Unit Coordinator at their direct line [phone].   ALL nursing questions or concerns can be directed to your ENT nurse at: [phone] - Lila     Follow up on Friday 07/29/2022 at [facility] at 10:20 AM  Check your insurance to see if they cover that provider

## 2022-07-27 NOTE — LETTER
7/27/2022         RE: Junior Castellanos  [address]  [place] [address]        Dear Colleague,    Thank you for referring your patient, Junior Castellanos, to the [facility]. Please see a copy of my visit note below.    Otolaryngology Note         Chief Complaint:     Patient presents with:  Ent Problem: Cyst on jaw/ referred by Dr. Hall            History of Present Illness:     Junior Castellanos is a 61 year old male seen today for left sided facial pain and swelling.    The symptoms came on gradually over the past 3 weeks and eventually led to significant trismus on 7/25/22.  He was seen in the Urgent Care.  CT facial bones without contrast completed, see results below.  He was started on Augmentin and referred to the dentist.  Yesterday he was seen in the dental clinic by Dr. Galdamez.  Films were completed, no obvious findings on film so he was referred to ENT.  He has been taking the Augmentin and tolerating well.  He has been noting some mild improvement in symptoms, he has only been on it for 1 day so far.    The pain worsens with eating.  He has associated trismus.  No fevers, chills, nausea or vomiting.  He is able to eat and drink adequate amounts.  He reports he is not feeling ill today.  Pain is rated at 2 out of 10 with a constant pressure feeling.  He denies any worsening of swelling with eating or thinking about eating.    He has a history of left submandibular cyst dating back to 15 years ago. No previous surgery on the area.  He previously would squeeze the area and have discharge.  He has not squeezed in the past 15 years.  No previous imaging or intervention to this area.      Type 2 diabetic, blood sugars 160's in the morning.  Last A1c 6.7 this last week.      No facial numbness or tingling.  No noted facial asymmetry.    CT facial bones without contrast completed on 7/25/2022:    IMPRESSION:   1. Periapical lucency in the second premolar, consistent with  a  periapical abscess.  2. Adjacent heterogeneity of the masseter muscle suggesting  inflammation in the muscle with changes in the subcutaneous fat  consistent inflammation as well. No definite abscess although no  intravenous contrast was administered.  3. Number of lymph nodes in the left submandibular region is increased  but they are not enlarged by size criteria.    CT facial bones with contrast completed today:  Findings:  There is no evident fracture of the facial bones. The  cribriform plate appears intact. Alignment of the facial bones appears  normal. Review of visualized dentition reveals left mandibular third  molar caries with periapical lucency and loss of cortical definition  of the overing mandibular cortex laterally. There is adjacent  periosteal reaction and elevation. No substantial subperiosteal fluid  collection.     Left facial edema and stranding centered over the angle of the  mandible, particularly affecting the lateral left  space and  left carotid space. Heterogeneous enhancement of the left masseter  with ill-defined hypodensity (series 2 image 23). Prominent left level  1B and level 2 lymph nodes. Asymmetric thickening of the left  platysma. Focal cutaneous defect in the skin inferolateral to the left  mandibular ramus (series 2 image 29) without associated enhancing  tract.      Regarding the orbits, there is no intra-abdominal hematoma, soft  tissue mass or gas. Mild paranasal sinus opacification. Bilateral  palatine tonsil mineralizations.                                                                      Impression:   1.  Left mandibular third molar periapical abscess with erosion  through the lateral left mandibular cortex resulting in adjacent  periostitis and developing phlegmon within the overlying left  masseter. No definite drainable abscess.   2.  Left facial cellulitis and left parotitis.          Medications:     Current Outpatient Rx   Medication Sig Dispense  "Refill     amoxicillin-clavulanate (AUGMENTIN) 875-125 MG tablet Take 1 tablet by mouth 2 times daily for 7 days 14 tablet 0     aspirin 81 MG EC tablet Take 81 mg by mouth daily       atorvastatin (LIPITOR) 10 MG tablet Take 10 mg by mouth every evening   10     blood glucose (CONTOUR NEXT TEST) test strip Use to test blood sugar 2 times daily 50 strip 11     metFORMIN (GLUCOPHAGE XR) 500 MG 24 hr tablet Take 1 tablet (500 mg) by mouth 2 times daily (with meals) 60 tablet 4     Microlet Lancets MISC USE TO TEST 2 TIMES DAILY 100 each 11     omeprazole (PRILOSEC) 40 MG DR capsule Take 40 mg by mouth daily        UNABLE TO FIND MEDICATION NAME: Multivitamin plant base liquid, 2 Tbsp daily       Vitamin D, Cholecalciferol, 10 MCG (400 UNIT) TABS Take 800 Units by mouth daily              Allergies:     Allergies: Patient has no known allergies.          Past Medical History:     Past Medical History:   Diagnosis Date     JAGUAR on CPAP             Past Surgical History:     Past Surgical History:   Procedure Laterality Date     COLONOSCOPY N/A 5/20/2016    Procedure: COLONOSCOPY;  Surgeon: Mayo Felix DO;  Location: [facility] OR     UPPER GI ENDOSCOPY         ENT family history reviewed         Social History:     Social History     Tobacco Use     Smoking status: Former Smoker     Types: Cigarettes     Smokeless tobacco: Never Used   Substance Use Topics     Alcohol use: No     Alcohol/week: 0.0 standard drinks     Drug use: No            Review of Systems:     ROS: See HPI         Physical Exam:     /62 (BP Location: Right arm, Patient Position: Sitting, Cuff Size: Adult Large)   Pulse 89   Temp 97.3  F (36.3  C) (Tympanic)   Ht 1.778 m (5' 10\")   Wt 93.4 kg (206 lb)   SpO2 94%   BMI 29.56 kg/m      General - The patient is well nourished and well developed, and appears to have good nutritional status.  Alert and oriented to person and place, answers questions and cooperates with examination " appropriately.  He does not appear toxic.    Head and Face - Normocephalic and atraumatic, with no gross asymmetry noted.  The facial nerve is intact, with strong symmetric movements. HB 1/6, smile is symmetric, tongue is midline.   Voice and Breathing - The patient was breathing comfortably without the use of accessory muscles. There was no wheezing, stridor. The patients voice was clear and strong, and had appropriate pitch and quality.  Ears - External ear normal.  The left canal is cerumen impacted, this was cleaned under binocular microscopy with cupped forceps.  The left tympanic membrane is intact without effusion or retraction.  Right canal was patent, right TM is intact without effusion or retraction.    Eyes - Extraocular movements intact, sclera were not icteric or injected.  Mouth -examination of the oral cavity was difficult due to trismus.  Patient has significant trismus with jaw opening approximately 1 inch.  The visualized portions of the oral cavity showed healthy mucosa.  The left buccal space just adjacent to the lower posterior jaw has a discrete hard annular mass in the submucosal tissue.  The buccal mucosa appears healthy, no abrasion or ulceration.  The left Stensen's duct is draining clear saliva.  There is mild tenderness to palpation of the annular mass.  There is no significant fluctuation.  The overlying skin appears flesh-colored without aldo erythema, or induration.  Throat - visualization of the throat is difficult due to trismus.  Neck - normal range of motion without pain.  No significant palpable lymphadenopathy.  Palpation of the thyroid was soft and smooth, with no nodules or goiter appreciated.  The trachea was mobile and midline.  Nose - External contour is symmetric, no gross deflection or scars.  Nasal mucosa is pink and moist with no abnormal mucus.  The septum and turbinates were evaluated with nasal speculum, no polyps, masses, or purulence noted on  examination.    Flexible laryngoscopy was completed due to difficulty in visualizing the base of the tongue and throat on direct examination due to trismus.    Attempts at mirror laryngoscopy were not possible due to gag reflex.  Therefore I proceeded with a fiberoptic examination after informed consent.  First I sprayed both sides of the nose with a mixture of lidocaine and neosynephrine.  I then passed the scope through the nasal cavity.     The nasopharynx was mucosally covered and symmetric.  The eustachian tube openings were unobstructed.  Going further down I had a clear view of the base of tongue which had normal appearing lingual tonsillar tissue.  The base of tongue was free of lesions, and the vallecula was open.  The epiglottis was smooth and mucosally covered.  The supraglottic larynx was then clearly visualized.  The vocal cords moved smoothly and symmetrically and were pearly white.  The pyriform sinuses were open and without aldo mass or pooling of secretions upon valsalva, and the limited view of the postcricoid region did not show any lesions.  The patient tolerated the procedure well.         Assessment and Plan:       ICD-10-CM    1. Periapical abscess with facial involvement  K04.7 Oral Surgery Referral   2. Swelling, mass, or lump on face  R22.0 CT Facial Bones with Contrast     CANCELED: Creatinine POCT   3. Facial cellulitis  L03.211      I consulted with Dr. Edwards at [facility] in [place], we discussed patient's HPI, exam findings and CT findings.  He recommends continuing with Augmentin at this time.  He will see the patient in clinic in 1 to 2 days for evaluation and possible removal of the tooth.  Referral is placed.    I appreciate the consultation and care of the patient.    Follow up on Friday 07/29/2022 at [facility] at 10:20 AM  Continue Augmentin  May try some warm compresses to the left side of the face  He declines need for pain  medication at this time.    Luisana NASSAR  [facility]        Again, thank you for allowing me to participate in the care of your patient.        Sincerely,        Luisana Machuca NP

## 2022-07-27 NOTE — PROGRESS NOTES
"Diabetes Self-Management Education & Support    Presents for: Individual review    SUBJECTIVE/OBJECTIVE:  Presents for: Individual review  Accompanied by: Self  Diabetes education in the past 24mo: Yes  Focus of Visit: Monitoring, Taking Medication  Diabetes type: Type 2  Disease course: Stable  How confident are you filling out medical forms by yourself:: Quite a bit  Diabetes management related comments/concerns: no concerns, need prescriptions updated  Transportation concerns: No  Difficulty affording diabetes medication?: No  Difficulty affording diabetes testing supplies?: No  Other concerns:: None  Cultural Influences/Ethnic Background:  Not  or     Diabetes Symptoms & Complications:  Fatigue: Sometimes  Neuropathy: Sometimes  Polydipsia: No  Polyphagia: No  Polyuria: Sometimes  Visual change: No  Slow healing wounds: No  Weight trend: Stable  Complications assessed today?: Yes  Autonomic neuropathy: No  CVA: No  Heart disease: No  Nephropathy: No  Peripheral neuropathy: No  Foot ulcerations: No  Peripheral Vascular Disease: No  Retinopathy: No  Sexual dysfunction: Yes    Patient Problem List and Family Medical History reviewed for relevant medical history, current medical status, and diabetes risk factors.    Vitals:  /75   Pulse 75   Resp 16   Ht 1.753 m (5' 9\")   Wt 93.4 kg (206 lb)   SpO2 97%   BMI 30.42 kg/m    Estimated body mass index is 30.42 kg/m  as calculated from the following:    Height as of this encounter: 1.753 m (5' 9\").    Weight as of this encounter: 93.4 kg (206 lb).   Last 3 BP:   BP Readings from Last 3 Encounters:   08/02/22 134/72   07/27/22 132/62   07/25/22 134/76       History   Smoking Status     Former Smoker     Types: Cigarettes   Smokeless Tobacco     Never Used       Labs:  Lab Results   Component Value Date    A1C 6.7 07/25/2022     Lab Results   Component Value Date     11/18/2021     04/03/2020     Lab Results   Component Value Date    " LDL 70 11/18/2019     HDL Cholesterol   Date Value Ref Range Status   11/18/2019 29 (L) 40 - 60 mg/dL Final   ]  GFR Estimate   Date Value Ref Range Status   04/03/2020 >90 >60 mL/min/[1.73_m2] Final     Comment:     Non  GFR Calc  Starting 12/18/2018, serum creatinine based estimated GFR (eGFR) will be   calculated using the Chronic Kidney Disease Epidemiology Collaboration   (CKD-EPI) equation.       GFR, ESTIMATED POCT   Date Value Ref Range Status   07/27/2022 >60 >60 mL/min/1.73m2 Final     GFR Estimate If Black   Date Value Ref Range Status   04/03/2020 >90 >60 mL/min/[1.73_m2] Final     Comment:      GFR Calc  Starting 12/18/2018, serum creatinine based estimated GFR (eGFR) will be   calculated using the Chronic Kidney Disease Epidemiology Collaboration   (CKD-EPI) equation.       Lab Results   Component Value Date    CR 0.8 07/27/2022    CR 0.73 11/22/2021    CR 0.76 04/03/2020     No results found for: MICROALBUMIN    Healthy Eating:  Cultural/Buddhist diet restrictions?: No  Meal planning/habits: Avoiding sweets, Carb counting, Keeps food records  How many times a week on average do you eat food made away from home (restaurant/take-out)?: 0  Meals include: Breakfast, Dinner, Evening Snack  Breakfast: eggs, oatmeal  Lunch: tuna, hot dogs  Dinner: meat, vegetables or tacos, hamburgers  Beverages: Water, Coffee    Being Active:  Exercise:: Yes (treadmill)  Days per week of moderate to strenuous exercise (like a brisk walk): 7  On average, minutes per day of exercise at this level: 20  How intense was your typical exercise? : Light (like stretching or slow walking)  Exercise Minutes per Week: 140  Barrier to exercise: None    Monitoring:  Monitoring Assessed Today: Yes  Blood Glucose Meter: ContourNext  Times checking blood sugar at home (number): 2  Times checking blood sugar at home (per): Day  Blood glucose trend: Fluctuating      Taking Medications:  Diabetes Medication(s)      Biguanides       metFORMIN (GLUCOPHAGE XR) 500 MG 24 hr tablet    Take 1 tablet (500 mg) by mouth 2 times daily (with meals)          Current Treatments: Diet, Oral Medication (taken by mouth)  Problems taking diabetes medications regularly?: No  Diabetes medication side effects?: No    Problem Solving:  Is the patient at risk for hypoglycemia?: No  Hypoglycemia Frequency: Never              Reducing Risks:  CAD Risks: Diabetes Mellitus, Sedentary lifestyle  Has dilated eye exam at least once a year?: Yes  Sees dentist every 6 months?: Yes  Feet checked by healthcare provider in the last year?: Yes (Feb 2022)    Healthy Coping:  Healthy Coping Assessed Today: Yes  Emotional response to diabetes: Confidence diabetes can be controlled  Informal Support system:: Family, Spouse  Stage of change: ACTION (Actively working towards change)  Support resources: None  Patient Activation Measure Survey Score:  No flowsheet data found.    Diabetes knowledge and skills assessment:   Patient is knowledgeable in diabetes management concepts related to: Healthy Eating, Being Active, Monitoring and Taking Medication    Patient needs further education on the following diabetes management concepts: Monitoring, Taking Medication and Reducing Risks    Based on learning assessment above, most appropriate setting for further diabetes education would be: Individual setting.      INTERVENTIONS:    Education provided today on:  AADE Self-Care Behaviors:  Diabetes Pathophysiology  Monitoring: log and interpret results, individual blood glucose targets and frequency of monitoring  Taking Medication: action of prescribed medication, side effects of prescribed medications and when to take medications  Reducing Risks: prevention, early diagnostic measures and treatment of complications, foot care, annual eye exam, A1C - goals, relating to blood glucose levels, how often to check and blood pressure and goals    Opportunities for ongoing  education and support in diabetes-self management were discussed.    Pt verbalized understanding of concepts discussed and recommendations provided today.       Education Materials Provided:  No new materials provided today      ASSESSMENT:  Readings range as follows: fastin-161, 170 and post-supper: 124-165  Fasting readings and A1C trending upward. Of note is that Ward has swelling and pain in left jaw. This may be affecting BG readings. He will be going to Urgent Care after this visit.    Ward continues to follow food plan and works on exercise. No changes for diabetes meds today.    Ward will be establishing care with Dr. Cristel Bhatt.        Patient's most recent   Lab Results   Component Value Date    A1C 6.7 2022    is meeting goal of <7.0    PLAN  See Patient Instructions for co-developed, patient-stated behavior change goals.  Follow in 3 to 6 months with Diabetes Ed  AVS printed and provided to patient today. See Follow-Up section for recommended follow-up.      Time Spent: 30 minutes  Encounter Type: Individual    Any diabetes medication dose changes were made via the CDE Protocol and Collaborative Practice Agreement with the patient's primary care provider. A copy of this encounter was shared with the provider.

## 2022-07-27 NOTE — NURSING NOTE
"Chief Complaint   Patient presents with     Ent Problem     Cyst on jaw/ referred by Dr. Hall        Initial /62 (BP Location: Right arm, Patient Position: Sitting, Cuff Size: Adult Large)   Pulse 89   Temp 97.3  F (36.3  C) (Tympanic)   Ht 1.778 m (5' 10\")   Wt 93.4 kg (206 lb)   SpO2 94%   BMI 29.56 kg/m   Estimated body mass index is 29.56 kg/m  as calculated from the following:    Height as of this encounter: 1.778 m (5' 10\").    Weight as of this encounter: 93.4 kg (206 lb).  Medication Reconciliation: complete  Lila Varghese LPN    "

## 2022-07-27 NOTE — PROGRESS NOTES
Otolaryngology Note         Chief Complaint:     Patient presents with:  Ent Problem: Cyst on jaw/ referred by Dr. Hall            History of Present Illness:     Junior Castellanos is a 61 year old male seen today for left sided facial pain and swelling.    The symptoms came on gradually over the past 3 weeks and eventually led to significant trismus on 7/25/22.  He was seen in the Urgent Care.  CT facial bones without contrast completed, see results below.  He was started on Augmentin and referred to the dentist.  Yesterday he was seen in the dental clinic by Dr. Galdamez.  Films were completed, no obvious findings on film so he was referred to ENT.  He has been taking the Augmentin and tolerating well.  He has been noting some mild improvement in symptoms, he has only been on it for 1 day so far.    The pain worsens with eating.  He has associated trismus.  No fevers, chills, nausea or vomiting.  He is able to eat and drink adequate amounts.  He reports he is not feeling ill today.  Pain is rated at 2 out of 10 with a constant pressure feeling.  He denies any worsening of swelling with eating or thinking about eating.    He has a history of left submandibular cyst dating back to 15 years ago. No previous surgery on the area.  He previously would squeeze the area and have discharge.  He has not squeezed in the past 15 years.  No previous imaging or intervention to this area.      Type 2 diabetic, blood sugars 160's in the morning.  Last A1c 6.7 this last week.      No facial numbness or tingling.  No noted facial asymmetry.    CT facial bones without contrast completed on 7/25/2022:    IMPRESSION:   1. Periapical lucency in the second premolar, consistent with a  periapical abscess.  2. Adjacent heterogeneity of the masseter muscle suggesting  inflammation in the muscle with changes in the subcutaneous fat  consistent inflammation as well. No definite abscess although no  intravenous contrast was  administered.  3. Number of lymph nodes in the left submandibular region is increased  but they are not enlarged by size criteria.    CT facial bones with contrast completed today:  Findings:  There is no evident fracture of the facial bones. The  cribriform plate appears intact. Alignment of the facial bones appears  normal. Review of visualized dentition reveals left mandibular third  molar caries with periapical lucency and loss of cortical definition  of the overing mandibular cortex laterally. There is adjacent  periosteal reaction and elevation. No substantial subperiosteal fluid  collection.     Left facial edema and stranding centered over the angle of the  mandible, particularly affecting the lateral left  space and  left carotid space. Heterogeneous enhancement of the left masseter  with ill-defined hypodensity (series 2 image 23). Prominent left level  1B and level 2 lymph nodes. Asymmetric thickening of the left  platysma. Focal cutaneous defect in the skin inferolateral to the left  mandibular ramus (series 2 image 29) without associated enhancing  tract.      Regarding the orbits, there is no intra-abdominal hematoma, soft  tissue mass or gas. Mild paranasal sinus opacification. Bilateral  palatine tonsil mineralizations.                                                                      Impression:   1.  Left mandibular third molar periapical abscess with erosion  through the lateral left mandibular cortex resulting in adjacent  periostitis and developing phlegmon within the overlying left  masseter. No definite drainable abscess.   2.  Left facial cellulitis and left parotitis.          Medications:     Current Outpatient Rx   Medication Sig Dispense Refill     amoxicillin-clavulanate (AUGMENTIN) 875-125 MG tablet Take 1 tablet by mouth 2 times daily for 7 days 14 tablet 0     aspirin 81 MG EC tablet Take 81 mg by mouth daily       atorvastatin (LIPITOR) 10 MG tablet Take 10 mg by mouth  "every evening   10     blood glucose (CONTOUR NEXT TEST) test strip Use to test blood sugar 2 times daily 50 strip 11     metFORMIN (GLUCOPHAGE XR) 500 MG 24 hr tablet Take 1 tablet (500 mg) by mouth 2 times daily (with meals) 60 tablet 4     Microlet Lancets MISC USE TO TEST 2 TIMES DAILY 100 each 11     omeprazole (PRILOSEC) 40 MG DR capsule Take 40 mg by mouth daily        UNABLE TO FIND MEDICATION NAME: Multivitamin plant base liquid, 2 Tbsp daily       Vitamin D, Cholecalciferol, 10 MCG (400 UNIT) TABS Take 800 Units by mouth daily              Allergies:     Allergies: Patient has no known allergies.          Past Medical History:     Past Medical History:   Diagnosis Date     JAGUAR on CPAP             Past Surgical History:     Past Surgical History:   Procedure Laterality Date     COLONOSCOPY N/A 5/20/2016    Procedure: COLONOSCOPY;  Surgeon: Mayo Felix DO;  Location: [facility]     UPPER GI ENDOSCOPY         ENT family history reviewed         Social History:     Social History     Tobacco Use     Smoking status: Former Smoker     Types: Cigarettes     Smokeless tobacco: Never Used   Substance Use Topics     Alcohol use: No     Alcohol/week: 0.0 standard drinks     Drug use: No            Review of Systems:     ROS: See HPI         Physical Exam:     /62 (BP Location: Right arm, Patient Position: Sitting, Cuff Size: Adult Large)   Pulse 89   Temp 97.3  F (36.3  C) (Tympanic)   Ht 1.778 m (5' 10\")   Wt 93.4 kg (206 lb)   SpO2 94%   BMI 29.56 kg/m      General - The patient is well nourished and well developed, and appears to have good nutritional status.  Alert and oriented to person and place, answers questions and cooperates with examination appropriately.  He does not appear toxic.    Head and Face - Normocephalic and atraumatic, with no gross asymmetry noted.  The facial nerve is intact, with strong symmetric movements. HB 1/6, smile is symmetric, tongue is midline.   Voice and Breathing - " The patient was breathing comfortably without the use of accessory muscles. There was no wheezing, stridor. The patients voice was clear and strong, and had appropriate pitch and quality.  Ears - External ear normal.  The left canal is cerumen impacted, this was cleaned under binocular microscopy with cupped forceps.  The left tympanic membrane is intact without effusion or retraction.  Right canal was patent, right TM is intact without effusion or retraction.    Eyes - Extraocular movements intact, sclera were not icteric or injected.  Mouth -examination of the oral cavity was difficult due to trismus.  Patient has significant trismus with jaw opening approximately 1 inch.  The visualized portions of the oral cavity showed healthy mucosa.  The left buccal space just adjacent to the lower posterior jaw has a discrete hard annular mass in the submucosal tissue.  The buccal mucosa appears healthy, no abrasion or ulceration.  The left Stensen's duct is draining clear saliva.  There is mild tenderness to palpation of the annular mass.  There is no significant fluctuation.  The overlying skin appears flesh-colored without aldo erythema, or induration.  Throat - visualization of the throat is difficult due to trismus.  Neck - normal range of motion without pain.  No significant palpable lymphadenopathy.  Palpation of the thyroid was soft and smooth, with no nodules or goiter appreciated.  The trachea was mobile and midline.  Nose - External contour is symmetric, no gross deflection or scars.  Nasal mucosa is pink and moist with no abnormal mucus.  The septum and turbinates were evaluated with nasal speculum, no polyps, masses, or purulence noted on examination.    Flexible laryngoscopy was completed due to difficulty in visualizing the base of the tongue and throat on direct examination due to trismus.    Attempts at mirror laryngoscopy were not possible due to gag reflex.  Therefore I proceeded with a fiberoptic  examination after informed consent.  First I sprayed both sides of the nose with a mixture of lidocaine and neosynephrine.  I then passed the scope through the nasal cavity.     The nasopharynx was mucosally covered and symmetric.  The eustachian tube openings were unobstructed.  Going further down I had a clear view of the base of tongue which had normal appearing lingual tonsillar tissue.  The base of tongue was free of lesions, and the vallecula was open.  The epiglottis was smooth and mucosally covered.  The supraglottic larynx was then clearly visualized.  The vocal cords moved smoothly and symmetrically and were pearly white.  The pyriform sinuses were open and without aldo mass or pooling of secretions upon valsalva, and the limited view of the postcricoid region did not show any lesions.  The patient tolerated the procedure well.         Assessment and Plan:       ICD-10-CM    1. Periapical abscess with facial involvement  K04.7 Oral Surgery Referral   2. Swelling, mass, or lump on face  R22.0 CT Facial Bones with Contrast     CANCELED: Creatinine POCT   3. Facial cellulitis  L03.211      I consulted with Dr. Edwards at [facility] in [place], we discussed patient's HPI, exam findings and CT findings.  He recommends continuing with Augmentin at this time.  He will see the patient in clinic in 1 to 2 days for evaluation and possible removal of the tooth.  Referral is placed.    I appreciate the consultation and care of the patient.    Follow up on Friday 07/29/2022 at [facility] in [place] at 10:20 AM  Continue Augmentin  May try some warm compresses to the left side of the face  He declines need for pain medication at this time.    Luisana Machuca NP-C  [facility] ENT

## 2022-07-27 NOTE — TELEPHONE ENCOUNTER
Faxed out referral to [facility] at [phone]

## 2022-07-29 ENCOUNTER — TELEPHONE (OUTPATIENT)
Dept: OTOLARYNGOLOGY | Facility: OTHER | Age: 62
End: 2022-07-29

## 2022-07-29 ENCOUNTER — TRANSFERRED RECORDS (OUTPATIENT)
Dept: HEALTH INFORMATION MANAGEMENT | Facility: CLINIC | Age: 62
End: 2022-07-29

## 2022-07-29 NOTE — TELEPHONE ENCOUNTER
Pt had a CT Facial Bones with Contrast at  on 07/27/22. Luisana Machuca referred him to [facility] in [place]. Pt called said he went to [facility] and Dr. Lynch told him that this is not a dental issue, they are referring him back to ENT. Pt wants to see Dr. Alaniz.

## 2022-07-29 NOTE — TELEPHONE ENCOUNTER
7/29/22    Patient spouse called from dental office, last seen by Saumya Machuca 7/27/22. Dentist stating patient needs to be seen by Dr. Alaniz with ENT. Offered first available 9/15/22 and the wait list. Spouse stating it is an emergency, and would like call back from Saumya Machuca nurse as soon as possible today at [phone].    -Delia FAYE

## 2022-08-01 PROBLEM — U07.1 PNEUMONIA DUE TO COVID-19 VIRUS: Status: ACTIVE | Noted: 2021-12-29

## 2022-08-01 PROBLEM — E78.2 MIXED HYPERLIPIDEMIA: Status: ACTIVE | Noted: 2020-01-17

## 2022-08-01 PROBLEM — J12.82 PNEUMONIA DUE TO COVID-19 VIRUS: Status: ACTIVE | Noted: 2021-12-29

## 2022-08-01 NOTE — PATIENT INSTRUCTIONS
Thank you for allowing Dr. Alaniz and our ENT team to participate in your care.  If your medications are too expensive, please give the nurse a call.  We can possibly change this medication.  If you have a scheduling or an appointment question please contact our Health Unit Coordinator at their direct line [phone].   ALL nursing questions or concerns can be directed to your ENT nurse at: [phone] - Blanca    Complete MRI Scan    Follow up with the [facility]

## 2022-08-02 ENCOUNTER — OFFICE VISIT (OUTPATIENT)
Dept: OTOLARYNGOLOGY | Facility: OTHER | Age: 62
End: 2022-08-02
Attending: OTOLARYNGOLOGY
Payer: COMMERCIAL

## 2022-08-02 VITALS
BODY MASS INDEX: 29.49 KG/M2 | OXYGEN SATURATION: 97 % | SYSTOLIC BLOOD PRESSURE: 134 MMHG | WEIGHT: 206 LBS | DIASTOLIC BLOOD PRESSURE: 72 MMHG | HEART RATE: 79 BPM | TEMPERATURE: 97.5 F | HEIGHT: 70 IN

## 2022-08-02 DIAGNOSIS — R25.2 TRISMUS: ICD-10-CM

## 2022-08-02 DIAGNOSIS — K13.79 HYPERTROPHIC SOFT PALATE: ICD-10-CM

## 2022-08-02 DIAGNOSIS — G47.33 OSA ON CPAP: ICD-10-CM

## 2022-08-02 DIAGNOSIS — R22.0 BUCCAL MASS: Primary | ICD-10-CM

## 2022-08-02 LAB
CREAT BLD-MCNC: 0.8 MG/DL (ref 0.7–1.3)
GFR SERPL CREATININE-BSD FRML MDRD: >60 ML/MIN/1.73M2

## 2022-08-02 PROCEDURE — G0463 HOSPITAL OUTPT CLINIC VISIT: HCPCS

## 2022-08-02 PROCEDURE — 99214 OFFICE O/P EST MOD 30 MIN: CPT | Performed by: OTOLARYNGOLOGY

## 2022-08-02 ASSESSMENT — PAIN SCALES - GENERAL: PAINLEVEL: NO PAIN (0)

## 2022-08-02 NOTE — NURSING NOTE
"Chief Complaint   Patient presents with     Follow Up     Periapical Abscess with Facial Involvement, Facial Cellulitis       Initial /72 (Cuff Size: Adult Regular)   Pulse 79   Temp 97.5  F (36.4  C) (Tympanic)   Ht 1.778 m (5' 10\")   Wt 93.4 kg (206 lb)   SpO2 97%   BMI 29.56 kg/m   Estimated body mass index is 29.56 kg/m  as calculated from the following:    Height as of this encounter: 1.778 m (5' 10\").    Weight as of this encounter: 93.4 kg (206 lb).  Medication Reconciliation: complete  Blanca Mercedes LPN    "

## 2022-08-02 NOTE — LETTER
8/2/2022         RE: Junior Castellanos  [address]  [place] [address]        Dear Colleague,    Thank you for referring your patient, Junior Castellanos, to the [facility]. Please see a copy of my visit note below.    Otolaryngology Progress Note          Junior Castellanos is a 61 year old male    Follow-up of left facial swelling.   This occurred suddenly 2-3 weeks ago  He awoke and had left jaw pain, trismus  Went to urgent care placed on augmentin    He noted good improvement with trismus  No distasteful oral drainage  No preauricular or submandibular swelling  No facial weakness or numbness  No dentalgia    He also had a small left submandibular skin cyst that initially drained 2 weeks ago  describes malodorous drainage.    No other prior episodes of left facial swelling  No prior biopsies of area    Dental extractions over 1 year ago without complications    A1c 6.7    He he was seen by Luisana on 727 for a periapical abscess and [facility]    CT facial bones on 727 showed a left mandibular molar periapical abscess with erosion through the mandibular cortex and associated left facial cellulitis and parotitis    CT was reviewed the periapicall abcess and overlying left facial cellulitis and parotitis is noted.  There is erosion of the left periapical abscess into the mandible.  No parotid mass or abscess.    The tongue base is clear the vallecula are clear there is bilateral mucoperiosteal thickening of the anterior and posterior ethmoids ostiomeatal complexes are occluded.  Polypoid change to the maxillary sinus mucosa    Significant history of type 2 diabetes, JAGUAR on CPAP.    Former smoker     He saw Dr. Lynch in the oral surgery on August 2.  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  However he did not see anything else that would indicate this would be causing associated swelling.  No tenderness erythema or  "swelling in the area of #17.  He was therefore sent back to us for reevaluation.    Prabha, wife accompanies him today      Physical Exam  /72 (Cuff Size: Adult Regular)   Pulse 79   Temp 97.5  F (36.4  C) (Tympanic)   Ht 1.778 m (5' 10\")   Wt 93.4 kg (206 lb)   SpO2 97%   BMI 29.56 kg/m    General - The patient is well nourished and well developed, and appears to have good nutritional status.  Alert and oriented to person and place, interactive.  Head and Face - Normocephalic and atraumatic, with no gross asymmetry noted of the contour of the facial features.  The facial nerve is intact, with strong symmetric movements.  Grade 1/6 bilaterally  Neck-no palpable parotid or submandibular masses   There is a left submandibular 3 mm cutaenous lesion that is c/w an epidermal inclusion cyst no active drainage  no palpable lymphadenopathy or thyroid mass.    Trachea is midline.  Eyes - Extraocular movements intact.   Ears- External auditory canals are patent, tympanic membranes are intact without effusion or worrisome retractions   Nose - Nasal mucosa is pink and moist with no abnormal mucus.  No polyps, masses, or purulence noted on examination.  Mouth - moderate trismus  2 fingerbreadth opening  Examination of the oral cavity shows pink, healthy, moist mucosa.  No ulceration noted.  The dentition are in good repair.  No gingival erythema or purulence, no obvious carries  The tongue is mobile and midline.    There is a left well-defined buccal space mass.    This measures approximately 2x2 cm and is firm but mobile.  I cannot tell if the mass is separate from the masseter.     Mildly tender to palpation  Clear saliva from Stensen's and Erin's ducts bilaterally  Throat - The walls of the oropharynx were smooth, pink, moist, symmetric, and had no lesions or ulcerations.  The tonsillar pillars and soft palate were symmetric.  The uvula was midline on elevation.  [place] Palate Position IV, easy gag " reflex    Impression/Plan  Junior Castellanos is a 61 year old male    ICD-10-CM    1. Buccal space mass, left  R22.0 MR Sinus Face w/o & w Contrast     Adult ENT  Referral   2. JAGUAR on CPAP  G47.33     Z99.89    3. Hypertrophic soft palate  K13.79    4. Trismus  R25.2          MRI face attention left buccal space mass    Referral [facility] appreciated    Buccal space a all natomy discussed    The mass does not appear to be related to the impacted canine and there was not a well defined mass or abscess on former CT.    Ward has also seen Dr. Lynch in oral surgery, who did not feel the mass was odontogenic.     Congratulated on CPAP use.      Oropharyngeal anatomy was also discussed.  He may be a difficult intubation due to his trismus.  This has improved with the Augmentin.  Consider preoperative prednisone, defer to [facility]          Deena Alaniz D.O.  Otolaryngology/Head and Neck Surgery  Allergy              Again, thank you for allowing me to participate in the care of your patient.        Sincerely,        Deena Alaniz MD

## 2022-08-02 NOTE — PROGRESS NOTES
"Otolaryngology Progress Note          Junior Castellanos is a 61 year old male    Follow-up of left facial swelling.   This occurred suddenly 2-3 weeks ago  He awoke and had left jaw pain, trismus  Went to urgent care placed on augmentin    He noted good improvement with trismus  No distasteful oral drainage  No preauricular or submandibular swelling  No facial weakness or numbness  No dentalgia    He also had a small left submandibular skin cyst that initially drained 2 weeks ago  describes malodorous drainage.    No other prior episodes of left facial swelling  No prior biopsies of area    Dental extractions over 1 year ago without complications    A1c 6.7    He he was seen by Luisana on 727 for a periapical abscess and [facility]    CT facial bones on 727 showed a left mandibular molar periapical abscess with erosion through the mandibular cortex and associated left facial cellulitis and parotitis    CT was reviewed the periapicall abcess and overlying left facial cellulitis and parotitis is noted.  There is erosion of the left periapical abscess into the mandible.  No parotid mass or abscess.    The tongue base is clear the vallecula are clear there is bilateral mucoperiosteal thickening of the anterior and posterior ethmoids ostiomeatal complexes are occluded.  Polypoid change to the maxillary sinus mucosa    Significant history of type 2 diabetes, JAGUAR on CPAP.    Former smoker     He saw Dr. Lynch in the oral surgery on August 2.  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  However he did not see anything else that would indicate this would be causing associated swelling.  No tenderness erythema or swelling in the area of #17.  He was therefore sent back to us for reevaluation.    Prabha, wife accompanies him today      Physical Exam  /72 (Cuff Size: Adult Regular)   Pulse 79   Temp 97.5  F (36.4  C) (Tympanic)   Ht 1.778 m (5' 10\")   Wt " 93.4 kg (206 lb)   SpO2 97%   BMI 29.56 kg/m    General - The patient is well nourished and well developed, and appears to have good nutritional status.  Alert and oriented to person and place, interactive.  Head and Face - Normocephalic and atraumatic, with no gross asymmetry noted of the contour of the facial features.  The facial nerve is intact, with strong symmetric movements.  Grade 1/6 bilaterally  Neck-no palpable parotid or submandibular masses   There is a left submandibular 3 mm cutaenous lesion that is c/w an epidermal inclusion cyst no active drainage  no palpable lymphadenopathy or thyroid mass.    Trachea is midline.  Eyes - Extraocular movements intact.   Ears- External auditory canals are patent, tympanic membranes are intact without effusion or worrisome retractions   Nose - Nasal mucosa is pink and moist with no abnormal mucus.  No polyps, masses, or purulence noted on examination.  Mouth - moderate trismus  2 fingerbreadth opening  Examination of the oral cavity shows pink, healthy, moist mucosa.  No ulceration noted.  The dentition are in good repair.  No gingival erythema or purulence, no obvious carries  The tongue is mobile and midline.    There is a left well-defined buccal space mass.    This measures approximately 2x2 cm and is firm but mobile.  I cannot tell if the mass is separate from the masseter.     Mildly tender to palpation  Clear saliva from Stensen's and [place]'s ducts bilaterally  Throat - The walls of the oropharynx were smooth, pink, moist, symmetric, and had no lesions or ulcerations.  The tonsillar pillars and soft palate were symmetric.  The uvula was midline on elevation.  [place] Palate Position IV, easy gag reflex    Impression/Plan  Junior Castellanos is a 61 year old male    ICD-10-CM    1. Buccal space mass, left  R22.0 MR Sinus Face w/o & w Contrast     Adult ENT  Referral   2. JAGUAR on CPAP  G47.33     Z99.89    3. Hypertrophic soft palate  K13.79    4.  Trismus  R25.2          MRI face attention left buccal space mass    [facility]    Buccal space a all natomy discussed    The mass does not appear to be related to the impacted canine and there was not a well defined mass or abscess on former CT.    Ward has also seen Dr. Lynch in oral surgery, who did not feel the mass was odontogenic.     Congratulated on CPAP use.      Oropharyngeal anatomy was also discussed.  He may be a difficult intubation due to his trismus.  This has improved with the Augmentin.  Consider preoperative prednisone, defer to [facility]          Deena Alaniz D.O.  Otolaryngology/Head and Neck Surgery  Allergy

## 2022-08-04 ENCOUNTER — TRANSCRIBE ORDERS (OUTPATIENT)
Dept: OTHER | Age: 62
End: 2022-08-04

## 2022-08-04 DIAGNOSIS — R22.0 BUCCAL MASS: Primary | ICD-10-CM

## 2022-08-07 NOTE — TELEPHONE ENCOUNTER
FUTURE VISIT INFORMATION      FUTURE VISIT INFORMATION:    Date: 8/29/22    Time: 10:20AM    Location: [facility]  REFERRAL INFORMATION:    Referring provider:  Deena Alaniz MD    Referring providers clinic:  [facility]      Reason for visit/diagnosis  Per Pt, dx buccal mass, referral from Deena Alaniz MD recs in [facility] [place] [facility] to schedule per Radhika in clinic appt date per patient, he has MRI scheduled for 8/17    RECORDS REQUESTED FROM:       Clinic name Comments Records Status Imaging Status   Imaging 8/17/22 MR Sinus Face (scheduled)   7/27/22 CT Facial Bone [facility] PACS   [facility]  8/2/22 note from Deena Alaniz MD [facility] ED  7/25/22 ED note from Vesta Carrasco, GALINDO   EPIC

## 2022-08-17 ENCOUNTER — HOSPITAL ENCOUNTER (OUTPATIENT)
Dept: MRI IMAGING | Facility: HOSPITAL | Age: 62
Discharge: HOME | End: 2022-08-17
Attending: OTOLARYNGOLOGY | Admitting: OTOLARYNGOLOGY
Payer: COMMERCIAL

## 2022-08-17 DIAGNOSIS — R22.0 BUCCAL MASS: ICD-10-CM

## 2022-08-17 DIAGNOSIS — R22.0 BUCCAL MASS: Primary | ICD-10-CM

## 2022-08-17 PROCEDURE — A9585 GADOBUTROL INJECTION: HCPCS | Performed by: RADIOLOGY

## 2022-08-17 PROCEDURE — 70543 MRI ORBT/FAC/NCK W/O &W/DYE: CPT

## 2022-08-17 PROCEDURE — 255N000002 HC RX 255 OP 636: Performed by: RADIOLOGY

## 2022-08-17 RX ORDER — GADOBUTROL 604.72 MG/ML
10 INJECTION INTRAVENOUS ONCE
Status: COMPLETED | OUTPATIENT
Start: 2022-08-17 | End: 2022-08-17

## 2022-08-17 RX ADMIN — GADOBUTROL 10 ML: 604.72 INJECTION INTRAVENOUS at 08:47

## 2022-08-27 NOTE — PROGRESS NOTES
Dear Dr. Alaniz:    I had the pleasure of meeting Junior Castellanos in consultation today at the [facility] at your request.     History of Present Illness:   Junior Castellanos is a 61 year old man referred for evaluation of a buccal lesion. He was seen on 7/25/2022 at the ER for a 3 week history of facial swelling. A CT scan was obtained which showed periapical abscess with adjacent heterogeneity of the masseter muscle suggesting inflammation. He was treated with a week of augmentin for a dental infection. He says this did improve his symptoms but did not completely resolve. He was seen in the local dental clinic without obvious findings. He was seen by the local ENT NP on 7/27/2022, recommended to complete antibiotics and referred to local oral surgeon who reported there was resorption of tooth #17 but no cause of the infection. Of note, repeat CT on 7/27/2022 demonstrated peripical abscess with erosion of bone, developing phlegmon. He was seen by Dr Alaniz on 8/2/2022 and recommended for MRI and referral to the . MRI on 8/17/2022 demonstrated third molar periapical abscess with reactive periostitis and possible phlegmon vs abscess.     He says there is a mobile mass in his left cheek, present for about a month. This has not changed. He denies any drainage from the skin. He says when the symptoms occurred he had posterior mandible/TMJ pain.    Patient's wife says that he had a cyst of his face several years ago, drained and then resolved about 15 years ago.    He is accompanied by his wife who helps supplement history.      Past medical history: diabetes, reflux, hyperlipidemia    Past surgical history: none    Social history: quit smoking in 1990s, 1 ppd x 15 years. No chewing tobacco. No alcohol. Retired - . .    Family history:    MEDICATIONS:     Current Outpatient Medications   Medication Sig Dispense Refill     amoxicillin-clavulanate (AUGMENTIN)  875-125 MG tablet Take 1 tablet by mouth every 12 hours for 14 days 28 tablet 0     aspirin 81 MG EC tablet Take 81 mg by mouth daily       atorvastatin (LIPITOR) 10 MG tablet Take 10 mg by mouth every evening   10     blood glucose (CONTOUR NEXT TEST) test strip Use to test blood sugar 2 times daily 50 strip 11     metFORMIN (GLUCOPHAGE XR) 500 MG 24 hr tablet Take 1 tablet (500 mg) by mouth 2 times daily (with meals) 60 tablet 4     Microlet Lancets MISC USE TO TEST 2 TIMES DAILY 100 each 11     omeprazole (PRILOSEC) 40 MG DR capsule Take 40 mg by mouth daily        UNABLE TO FIND MEDICATION NAME: Multivitamin plant base liquid, 2 Tbsp daily       Vitamin D, Cholecalciferol, 10 MCG (400 UNIT) TABS Take 800 Units by mouth daily         ALLERGIES:  No Known Allergies    HABITS/SOCIAL HISTORY:   Quit smoking in , 1 ppd x 15 years. No chewing tobacco. No alcohol.   Retired - .   .    Social History     Socioeconomic History     Marital status:      Spouse name: Not on file     Number of children: 5     Years of education: Not on file     Highest education level: Not on file   Occupational History     Not on file   Tobacco Use     Smoking status: Former Smoker     Packs/day: 1.00     Years: 15.00     Pack years: 15.00     Types: Cigarettes     Start date: 1979     Quit date: 1994     Years since quittin.1     Smokeless tobacco: Never Used   Substance and Sexual Activity     Alcohol use: No     Alcohol/week: 0.0 standard drinks     Drug use: No     Sexual activity: Not Currently     Partners: Female     Birth control/protection: None   Other Topics Concern      Service Not Asked     Blood Transfusions No     Comment: PERMITS     Caffeine Concern Yes     Comment: 2 cups coffee in the am , ice tea in pm      Occupational Exposure Not Asked     Hobby Hazards Not Asked     Sleep Concern Not Asked     Stress Concern Not Asked     Weight Concern Not Asked     Special Diet  "Not Asked     Back Care Not Asked     Exercise No     Comment: \"needs to start\"  [facility]     Bike Helmet Not Asked     Seat Belt Yes     Self-Exams Not Asked   Social History Narrative    7/30/2015: recently moved here to [place] from [place].  He worked as a .     Social Determinants of Health     Financial Resource Strain: Not on file   Food Insecurity: Not on file   Transportation Needs: Not on file   Physical Activity: Not on file   Stress: Not on file   Social Connections: Not on file   Intimate Partner Violence: Not on file   Housing Stability: Not on file       PAST MEDICAL HISTORY:   Past Medical History:   Diagnosis Date     JAGUAR on CPAP         PAST SURGICAL HISTORY:   Past Surgical History:   Procedure Laterality Date     COLONOSCOPY N/A 5/20/2016    Procedure: COLONOSCOPY;  Surgeon: Mayo Felix DO;  Location: [facility] OR     UPPER GI ENDOSCOPY         FAMILY HISTORY:  No family history on file.    REVIEW OF SYSTEMS:  12 point ROS was negative other than the symptoms noted above in the HPI.  Patient Supplied Answers to Review of Systems  No flowsheet data found.      PHYSICAL EXAMINATION:   /84   Pulse 61   Temp 97.3  F (36.3  C)   Ht 1.778 m (5' 10\")   Wt 91.6 kg (202 lb)   SpO2 95%   BMI 28.98 kg/m     Appearance:   normal; NAD, age-appropriate appearance, well-developed, normal habitus   Communication:   normal; communicates verbally, normal voice quality   Head/Face:   inspection -  Left cheek with about 1-1.5 cm raised slightly violaceous lesion without pain or drainage, about 1.5 cm below this is a punctate skin opening without drainage that does not track superiorly to the skin lesion   Palpation - no facial numbness; skin lesion of left cheek is firm without fluctuance and nontender   Salivary glands -  Normal size, no tenderness, swelling, or palpable masses   Facial strength -  Normal and symmetric bilateral; H/B I/VI   Skin:  normal, no rash "   Ears:  auricle (AD) -  normal  EAC (AD) -  normal  TM (AD) -  Normal, no effusion  auricle (AS) -  normal  EAC (AS) -  normal  TM (AS) -  Normal, no effusion  Normal clinical speech reception   Nose:  Ext. inspection -  Normal   Oral Cavity:  lips -  Normal mucosa, oral competence, and stoma size   Age-appropriate dentition, healthy gingival mucosa   Hard palate, buccal, floor of mouth mucosa normal   Tongue - normal movement, no lesions   Oropharynx:  mucosa -  Normal, no lesions  soft palate -  Normal, no lesions, no asymmetry, normal elevation   Neck: No visible mass or asymmetry   Normal palpation, no tenderness, no tracheal deviation  Normal range of motion   Lymphatic:  no abnormal nodes   Cardiovascular:  warm, pink, well-perfused extremities without swelling, tenderness, or edema   Respiratory:  Normal respiratory effort, no stridor   Neuro/Psych.:  mood/affect -  normal  mental status -  normal         PROCEDURES:   Left cheek FNA: The area of the left cheek was needle aspirated with a 21 gauge needle for a total of 3 passes. No purulence expressed. Specimen was placed in formalin. Patient tolerated the procedure well with no immediate complications.      RESULTS REVIEWED:   I reviewed the notes from Dr Alaniz, ENT NP, and the ER, MRI report, CT report x 2    I independently reviewed the MRI images      IMPRESSION AND PLAN:   Junior Castellanos is a 61 year old man referred for evaluation of a left cheek lesion. We discussed that he had CT scans and MRI imaging which raised concerns for dental source. He has seen an oral surgeon locally who did not think there was a dental cause for the issues. He was supposed to see oral surgery here but does not yet have an appointment.     I performed an FNA of the left cheek lesion. This could very well be infectious in nature but we will await the final results.    I provided him with a course of antibiotics given the previous improvement he had.    I will reach out  to oral surgery to get their input.     We will arrange appropriate follow-up pending the FNA results and input from oral surgery.    Thank you very much for the opportunity to participate in the care of your patient.      Niyah Recinos MD  Otolaryngology- Head & Neck Surgery      This note was dictated with voice recognition software and then edited. Please excuse any unintentional errors.         CC:  Deena Alaniz MD  [place] [address] [place] [address]  [phone]

## 2022-08-29 ENCOUNTER — PRE VISIT (OUTPATIENT)
Dept: OTOLARYNGOLOGY | Facility: CLINIC | Age: 62
End: 2022-08-29

## 2022-08-29 ENCOUNTER — OFFICE VISIT (OUTPATIENT)
Dept: OTOLARYNGOLOGY | Facility: CLINIC | Age: 62
End: 2022-08-29
Attending: OTOLARYNGOLOGY
Payer: COMMERCIAL

## 2022-08-29 VITALS
WEIGHT: 202 LBS | HEIGHT: 70 IN | SYSTOLIC BLOOD PRESSURE: 109 MMHG | HEART RATE: 61 BPM | DIASTOLIC BLOOD PRESSURE: 84 MMHG | TEMPERATURE: 97.3 F | OXYGEN SATURATION: 95 % | BODY MASS INDEX: 28.92 KG/M2

## 2022-08-29 DIAGNOSIS — R22.0 BUCCAL MASS: ICD-10-CM

## 2022-08-29 PROCEDURE — 88305 TISSUE EXAM BY PATHOLOGIST: CPT | Mod: 26 | Performed by: PATHOLOGY

## 2022-08-29 PROCEDURE — 99204 OFFICE O/P NEW MOD 45 MIN: CPT | Mod: 25 | Performed by: OTOLARYNGOLOGY

## 2022-08-29 PROCEDURE — 88305 TISSUE EXAM BY PATHOLOGIST: CPT | Mod: TC | Performed by: OTOLARYNGOLOGY

## 2022-08-29 PROCEDURE — 88173 CYTOPATH EVAL FNA REPORT: CPT | Mod: 26 | Performed by: PATHOLOGY

## 2022-08-29 PROCEDURE — 10021 FNA BX W/O IMG GDN 1ST LES: CPT | Performed by: OTOLARYNGOLOGY

## 2022-08-29 ASSESSMENT — PAIN SCALES - GENERAL: PAINLEVEL: NO PAIN (0)

## 2022-08-29 NOTE — NURSING NOTE
"Chief Complaint   Patient presents with     Consult     Buccal mass      Blood pressure 109/84, pulse 61, temperature 97.3  F (36.3  C), height 1.778 m (5' 10\"), weight 91.6 kg (202 lb), SpO2 95 %.    Brent Dunn LPN    "

## 2022-08-29 NOTE — LETTER
8/29/2022       RE: Junior Castellanos  [address] [facility] [address]     Dear Colleague,    Thank you for referring your patient, Junior Castellanos, to the [facility] [place] at [facility]. Please see a copy of my visit note below.    Dear Dr. Alaniz:    I had the pleasure of meeting Junior Castellanos in consultation today at the [facility] at your request.     History of Present Illness:   Junior Castellanos is a 61 year old man referred for evaluation of a buccal lesion. He was seen on 7/25/2022 at the ER for a 3 week history of facial swelling. A CT scan was obtained which showed periapical abscess with adjacent heterogeneity of the masseter muscle suggesting inflammation. He was treated with a week of augmentin for a dental infection. He says this did improve his symptoms but did not completely resolve. He was seen in the local dental clinic without obvious findings. He was seen by the local ENT NP on 7/27/2022, recommended to complete antibiotics and referred to local oral surgeon who reported there was resorption of tooth #17 but no cause of the infection. Of note, repeat CT on 7/27/2022 demonstrated peripical abscess with erosion of bone, developing phlegmon. He was seen by Dr Alaniz on 8/2/2022 and recommended for MRI and referral to the . MRI on 8/17/2022 demonstrated third molar periapical abscess with reactive periostitis and possible phlegmon vs abscess.     He says there is a mobile mass in his left cheek, present for about a month. This has not changed. He denies any drainage from the skin. He says when the symptoms occurred he had posterior mandible/TMJ pain.    Patient's wife says that he had a cyst of his face several years ago, drained and then resolved about 15 years ago.    He is accompanied by his wife who helps supplement history.      Past medical history: diabetes, reflux,  hyperlipidemia    Past surgical history: none    Social history: quit smoking in , 1 ppd x 15 years. No chewing tobacco. No alcohol. Retired - . .    Family history:    MEDICATIONS:     Current Outpatient Medications   Medication Sig Dispense Refill     amoxicillin-clavulanate (AUGMENTIN) 875-125 MG tablet Take 1 tablet by mouth every 12 hours for 14 days 28 tablet 0     aspirin 81 MG EC tablet Take 81 mg by mouth daily       atorvastatin (LIPITOR) 10 MG tablet Take 10 mg by mouth every evening   10     blood glucose (CONTOUR NEXT TEST) test strip Use to test blood sugar 2 times daily 50 strip 11     metFORMIN (GLUCOPHAGE XR) 500 MG 24 hr tablet Take 1 tablet (500 mg) by mouth 2 times daily (with meals) 60 tablet 4     Microlet Lancets MISC USE TO TEST 2 TIMES DAILY 100 each 11     omeprazole (PRILOSEC) 40 MG DR capsule Take 40 mg by mouth daily        UNABLE TO FIND MEDICATION NAME: Multivitamin plant base liquid, 2 Tbsp daily       Vitamin D, Cholecalciferol, 10 MCG (400 UNIT) TABS Take 800 Units by mouth daily         ALLERGIES:  No Known Allergies    HABITS/SOCIAL HISTORY:   Quit smoking in , 1 ppd x 15 years. No chewing tobacco. No alcohol.   Retired - .   .    Social History     Socioeconomic History     Marital status:      Spouse name: Not on file     Number of children: 5     Years of education: Not on file     Highest education level: Not on file   Occupational History     Not on file   Tobacco Use     Smoking status: Former Smoker     Packs/day: 1.00     Years: 15.00     Pack years: 15.00     Types: Cigarettes     Start date: 1979     Quit date: 1994     Years since quittin.1     Smokeless tobacco: Never Used   Substance and Sexual Activity     Alcohol use: No     Alcohol/week: 0.0 standard drinks     Drug use: No     Sexual activity: Not Currently     Partners: Female     Birth control/protection: None   Other Topics Concern      " Service Not Asked     Blood Transfusions No     Comment: PERMITS     Caffeine Concern Yes     Comment: 2 cups coffee in the am , ice tea in pm      Occupational Exposure Not Asked     Hobby Hazards Not Asked     Sleep Concern Not Asked     Stress Concern Not Asked     Weight Concern Not Asked     Special Diet Not Asked     Back Care Not Asked     Exercise No     Comment: \"needs to start\"  has been walking [facility]     Bike Helmet Not Asked     Seat Belt Yes     Self-Exams Not Asked   Social History Narrative    7/30/2015: recently moved here to [place] from [place].  He worked as a .     Social Determinants of Health     Financial Resource Strain: Not on file   Food Insecurity: Not on file   Transportation Needs: Not on file   Physical Activity: Not on file   Stress: Not on file   Social Connections: Not on file   Intimate Partner Violence: Not on file   Housing Stability: Not on file       PAST MEDICAL HISTORY:   Past Medical History:   Diagnosis Date     JAGUAR on CPAP         PAST SURGICAL HISTORY:   Past Surgical History:   Procedure Laterality Date     COLONOSCOPY N/A 5/20/2016    Procedure: COLONOSCOPY;  Surgeon: Mayo Felix DO;  Location: [facility]     UPPER GI ENDOSCOPY         FAMILY HISTORY:  No family history on file.    REVIEW OF SYSTEMS:  12 point ROS was negative other than the symptoms noted above in the HPI.  Patient Supplied Answers to Review of Systems  No flowsheet data found.      PHYSICAL EXAMINATION:   /84   Pulse 61   Temp 97.3  F (36.3  C)   Ht 1.778 m (5' 10\")   Wt 91.6 kg (202 lb)   SpO2 95%   BMI 28.98 kg/m     Appearance:   normal; NAD, age-appropriate appearance, well-developed, normal habitus   Communication:   normal; communicates verbally, normal voice quality   Head/Face:   inspection -  Left cheek with about 1-1.5 cm raised slightly violaceous lesion without pain or drainage, about 1.5 cm below this is a punctate skin opening without drainage " that does not track superiorly to the skin lesion   Palpation - no facial numbness; skin lesion of left cheek is firm without fluctuance and nontender   Salivary glands -  Normal size, no tenderness, swelling, or palpable masses   Facial strength -  Normal and symmetric bilateral; H/B I/VI   Skin:  normal, no rash   Ears:  auricle (AD) -  normal  EAC (AD) -  normal  TM (AD) -  Normal, no effusion  auricle (AS) -  normal  EAC (AS) -  normal  TM (AS) -  Normal, no effusion  Normal clinical speech reception   Nose:  Ext. inspection -  Normal   Oral Cavity:  lips -  Normal mucosa, oral competence, and stoma size   Age-appropriate dentition, healthy gingival mucosa   Hard palate, buccal, floor of mouth mucosa normal   Tongue - normal movement, no lesions   Oropharynx:  mucosa -  Normal, no lesions  soft palate -  Normal, no lesions, no asymmetry, normal elevation   Neck: No visible mass or asymmetry   Normal palpation, no tenderness, no tracheal deviation  Normal range of motion   Lymphatic:  no abnormal nodes   Cardiovascular:  warm, pink, well-perfused extremities without swelling, tenderness, or edema   Respiratory:  Normal respiratory effort, no stridor   Neuro/Psych.:  mood/affect -  normal  mental status -  normal         PROCEDURES:   Left cheek FNA: The area of the left cheek was needle aspirated with a 21 gauge needle for a total of 3 passes. No purulence expressed. Specimen was placed in formalin. Patient tolerated the procedure well with no immediate complications.      RESULTS REVIEWED:   I reviewed the notes from Dr Alaniz, ENT NP, and the ER, MRI report, CT report x 2    I independently reviewed the MRI images      IMPRESSION AND PLAN:   Junior Castellanos is a 61 year old man referred for evaluation of a left cheek lesion. We discussed that he had CT scans and MRI imaging which raised concerns for dental source. He has seen an oral surgeon locally who did not think there was a dental cause for the issues.  He was supposed to see oral surgery here but does not yet have an appointment.     I performed an FNA of the left cheek lesion. This could very well be infectious in nature but we will await the final results.    I provided him with a course of antibiotics given the previous improvement he had.    I will reach out to oral surgery to get their input.     We will arrange appropriate follow-up pending the FNA results and input from oral surgery.    Thank you very much for the opportunity to participate in the care of your patient.      Niyah Recinos MD  Otolaryngology- Head & Neck Surgery      This note was dictated with voice recognition software and then edited. Please excuse any unintentional errors.       CC:  Deena Alaniz MD  [facility]  [address] [place] [address]  [phone]

## 2022-08-30 ENCOUNTER — TELEPHONE (OUTPATIENT)
Dept: OTOLARYNGOLOGY | Facility: CLINIC | Age: 62
End: 2022-08-30

## 2022-08-30 NOTE — TELEPHONE ENCOUNTER
Called and left voicemail for patient - oral surgeon team here reviewed scan and agreed that this is a dental issue. They will work on getting the patient in for an appointment.     Niyah Recinos MD    Department of Otolaryngology

## 2022-08-31 LAB
PATH REPORT.COMMENTS IMP SPEC: NORMAL
PATH REPORT.FINAL DX SPEC: NORMAL
PATH REPORT.GROSS SPEC: NORMAL
PATH REPORT.MICROSCOPIC SPEC OTHER STN: NORMAL
PATH REPORT.RELEVANT HX SPEC: NORMAL

## 2022-09-01 ENCOUNTER — TELEPHONE (OUTPATIENT)
Dept: OTOLARYNGOLOGY | Facility: CLINIC | Age: 62
End: 2022-09-01

## 2022-09-01 NOTE — TELEPHONE ENCOUNTER
Called patient to discuss results from biopsy. Confirmed that patient has an appointment scheduled with the oral surgeon tomorrow. Patient had no further questions at this time.

## 2022-09-11 ENCOUNTER — HEALTH MAINTENANCE LETTER (OUTPATIENT)
Age: 62
End: 2022-09-11

## 2022-10-04 ENCOUNTER — HOSPITAL ENCOUNTER (EMERGENCY)
Facility: HOSPITAL | Age: 62
Discharge: HOME | End: 2022-10-04
Attending: PHYSICIAN ASSISTANT | Admitting: PHYSICIAN ASSISTANT
Payer: COMMERCIAL

## 2022-10-04 VITALS
RESPIRATION RATE: 16 BRPM | SYSTOLIC BLOOD PRESSURE: 150 MMHG | OXYGEN SATURATION: 97 % | DIASTOLIC BLOOD PRESSURE: 90 MMHG | HEART RATE: 95 BPM | TEMPERATURE: 97.3 F

## 2022-10-04 DIAGNOSIS — K08.89 PAIN, DENTAL: Primary | ICD-10-CM

## 2022-10-04 PROCEDURE — G0463 HOSPITAL OUTPT CLINIC VISIT: HCPCS

## 2022-10-04 PROCEDURE — 99213 OFFICE O/P EST LOW 20 MIN: CPT | Performed by: PHYSICIAN ASSISTANT

## 2022-10-04 ASSESSMENT — ENCOUNTER SYMPTOMS: FEVER: 0

## 2022-10-04 NOTE — ED TRIAGE NOTES
Pt presents with mouth pain that has been going on since July. Pt states took abx up until mid September that took care of the symptoms. Pt states symptoms have been slowly coming back. Pt has appointment in November with PCP.

## 2022-10-04 NOTE — ED PROVIDER NOTES
History     Chief Complaint   Patient presents with     Jaw Pain     HPI  Junior Castellanos is a 61 year old male who presents to urgent care for evaluation of possible dental infection.  Patient states that he has a history of dental infections and was last seen here in July for a dental infection and treated with course of Augmentin which resolved his dental pain.  Patient states that over the past couple weeks he has had intermittent swelling/bumps in his mouth and has had pus draining from his mouth.  He denies any fevers, chills, or any other associated symptoms currently.    Allergies:  No Known Allergies    Problem List:    Patient Active Problem List    Diagnosis Date Noted     Pneumonia due to COVID-19 virus 2021     Priority: Medium     Infection due to 2019 novel coronavirus 2021     Priority: Medium     Chronic respiratory failure with hypoxia (H) 2021     Priority: Medium     Hyperlipidemia associated with type 2 diabetes mellitus (H) 2020     Priority: Medium     Mixed hyperlipidemia 2020     Priority: Medium     Type 2 diabetes mellitus without complication, without long-term current use of insulin (H) 2019     Priority: Medium     Uncontrolled         JAGUAR on CPAP      Priority: Medium        Past Medical History:    Past Medical History:   Diagnosis Date     JAGUAR on CPAP        Past Surgical History:    Past Surgical History:   Procedure Laterality Date     COLONOSCOPY N/A 2016    Procedure: COLONOSCOPY;  Surgeon: Mayo Felix DO;  Location: [facility]     UPPER GI ENDOSCOPY         Family History:    History reviewed. No pertinent family history.    Social History:  Marital Status:   [2]  Social History     Tobacco Use     Smoking status: Former Smoker     Packs/day: 1.00     Years: 15.00     Pack years: 15.00     Types: Cigarettes     Start date: 1979     Quit date: 1994     Years since quittin.2     Smokeless tobacco: Never Used    Substance Use Topics     Alcohol use: No     Alcohol/week: 0.0 standard drinks     Drug use: No        Medications:    amoxicillin-clavulanate (AUGMENTIN) 875-125 MG tablet  aspirin 81 MG EC tablet  atorvastatin (LIPITOR) 10 MG tablet  blood glucose (CONTOUR NEXT TEST) test strip  metFORMIN (GLUCOPHAGE XR) 500 MG 24 hr tablet  Microlet Lancets MISC  omeprazole (PRILOSEC) 40 MG DR capsule  UNABLE TO FIND  Vitamin D, Cholecalciferol, 10 MCG (400 UNIT) TABS          Review of Systems   Constitutional: Negative for fever.   HENT: Positive for dental problem.    All other systems reviewed and are negative.      Physical Exam   BP: 150/90  Pulse: 95  Temp: 97.3  F (36.3  C)  Resp: 16  SpO2: 97 %      Physical Exam  Vitals and nursing note reviewed.   Constitutional:       General: He is not in acute distress.     Appearance: Normal appearance. He is not ill-appearing or toxic-appearing.   HENT:      Mouth/Throat:        Comments: Patient has pain with palpation over lower left aspect of mouth.  No obvious broken tooth, or dental abscess visible on physical exam.  Cardiovascular:      Rate and Rhythm: Regular rhythm.      Heart sounds: Normal heart sounds.   Pulmonary:      Breath sounds: Normal breath sounds.   Neurological:      Mental Status: He is oriented to person, place, and time.         ED Course                 Procedures             Critical Care time:               No results found for this or any previous visit (from the past 24 hour(s)).    Medications - No data to display    Assessments & Plan (with Medical Decision Making)   #1.  Dental pain    Discussed exam findings with patient.  Patient is prescribed course of Augmentin for suspected dental infection.  Patient has tolerated this antibiotic in the past without any issue.  Patient is encouraged to follow back up with dentist when available.  Tylenol or ibuprofen as directed for pain along with cold packs.  Any additional concerns patient can return to  urgent care or follow-up with primary care provider.  Patient verbalized understanding and agreement of plan.    I have reviewed the nursing notes.    I have reviewed the findings, diagnosis, plan and need for follow up with the patient.    Discharge Medication List as of 10/4/2022  9:50 AM      START taking these medications    Details   amoxicillin-clavulanate (AUGMENTIN) 875-125 MG tablet Take 1 tablet by mouth 2 times daily for 7 days, Disp-14 tablet, R-0, E-Prescribe             Final diagnoses:   Pain, dental       10/4/2022   [facility]     Nicanor Gill PA-C  10/04/22 [id number]

## 2022-10-04 NOTE — ED TRIAGE NOTES
Patient has left jaw pain. Has been seeing an ENT, dentist and and oral surgeon. Possible tooth or skin issue. Had been taking amoxicillin. Stopped beginning of Sep[tember and a week later the pain started to slowly come back

## 2022-11-16 ENCOUNTER — OFFICE VISIT (OUTPATIENT)
Dept: FAMILY MEDICINE | Facility: OTHER | Age: 62
End: 2022-11-16
Attending: FAMILY MEDICINE
Payer: COMMERCIAL

## 2022-11-16 VITALS
OXYGEN SATURATION: 98 % | BODY MASS INDEX: 29.62 KG/M2 | TEMPERATURE: 97.4 F | DIASTOLIC BLOOD PRESSURE: 58 MMHG | HEART RATE: 71 BPM | SYSTOLIC BLOOD PRESSURE: 120 MMHG | HEIGHT: 69 IN | WEIGHT: 200 LBS

## 2022-11-16 DIAGNOSIS — Z11.4 SCREENING FOR HIV (HUMAN IMMUNODEFICIENCY VIRUS): ICD-10-CM

## 2022-11-16 DIAGNOSIS — R20.2 PARESTHESIAS: ICD-10-CM

## 2022-11-16 DIAGNOSIS — Z11.59 NEED FOR HEPATITIS C SCREENING TEST: ICD-10-CM

## 2022-11-16 DIAGNOSIS — Z71.89 ACP (ADVANCE CARE PLANNING): ICD-10-CM

## 2022-11-16 DIAGNOSIS — G47.33 OSA (OBSTRUCTIVE SLEEP APNEA): ICD-10-CM

## 2022-11-16 DIAGNOSIS — E78.2 MIXED HYPERLIPIDEMIA: ICD-10-CM

## 2022-11-16 DIAGNOSIS — R73.03 PREDIABETES: Primary | ICD-10-CM

## 2022-11-16 DIAGNOSIS — E78.5 HYPERLIPIDEMIA LDL GOAL <100: ICD-10-CM

## 2022-11-16 DIAGNOSIS — E55.9 HYPOVITAMINOSIS D: ICD-10-CM

## 2022-11-16 PROBLEM — K21.9 GASTROESOPHAGEAL REFLUX DISEASE WITHOUT ESOPHAGITIS: Status: ACTIVE | Noted: 2022-11-16

## 2022-11-16 LAB
ALBUMIN SERPL BCG-MCNC: 4.8 G/DL (ref 3.5–5.2)
ALP SERPL-CCNC: 71 U/L (ref 40–129)
ALT SERPL W P-5'-P-CCNC: 45 U/L (ref 10–50)
ANION GAP SERPL CALCULATED.3IONS-SCNC: 10 MMOL/L (ref 7–15)
AST SERPL W P-5'-P-CCNC: 21 U/L (ref 10–50)
BILIRUB SERPL-MCNC: 0.4 MG/DL
BUN SERPL-MCNC: 15.5 MG/DL (ref 8–23)
CALCIUM SERPL-MCNC: 9.2 MG/DL (ref 8.8–10.2)
CHLORIDE SERPL-SCNC: 104 MMOL/L (ref 98–107)
CHOLEST SERPL-MCNC: 105 MG/DL
CREAT SERPL-MCNC: 0.82 MG/DL (ref 0.67–1.17)
CREAT UR-MCNC: 76 MG/DL
DEPRECATED HCO3 PLAS-SCNC: 24 MMOL/L (ref 22–29)
EST. AVERAGE GLUCOSE BLD GHB EST-MCNC: 148 MG/DL
GFR SERPL CREATININE-BSD FRML MDRD: >90 ML/MIN/1.73M2
GLUCOSE SERPL-MCNC: 207 MG/DL (ref 70–99)
HBA1C MFR BLD: 6.8 %
HDLC SERPL-MCNC: 39 MG/DL
LDLC SERPL CALC-MCNC: 48 MG/DL
MICROALBUMIN UR-MCNC: 35.2 MG/L
MICROALBUMIN/CREAT UR: 46.32 MG/G CR (ref 0–17)
NONHDLC SERPL-MCNC: 66 MG/DL
POTASSIUM SERPL-SCNC: 3.8 MMOL/L (ref 3.4–5.3)
PROT SERPL-MCNC: 7.1 G/DL (ref 6.4–8.3)
SODIUM SERPL-SCNC: 138 MMOL/L (ref 136–145)
TRIGL SERPL-MCNC: 89 MG/DL
TSH SERPL DL<=0.005 MIU/L-ACNC: 3.32 UIU/ML (ref 0.3–4.2)
VIT B12 SERPL-MCNC: 666 PG/ML (ref 232–1245)

## 2022-11-16 PROCEDURE — G0463 HOSPITAL OUTPT CLINIC VISIT: HCPCS

## 2022-11-16 PROCEDURE — 83036 HEMOGLOBIN GLYCOSYLATED A1C: CPT | Mod: ZL | Performed by: FAMILY MEDICINE

## 2022-11-16 PROCEDURE — 82607 VITAMIN B-12: CPT | Mod: ZL | Performed by: FAMILY MEDICINE

## 2022-11-16 PROCEDURE — 82040 ASSAY OF SERUM ALBUMIN: CPT | Mod: ZL | Performed by: FAMILY MEDICINE

## 2022-11-16 PROCEDURE — 82043 UR ALBUMIN QUANTITATIVE: CPT | Mod: ZL | Performed by: FAMILY MEDICINE

## 2022-11-16 PROCEDURE — 80053 COMPREHEN METABOLIC PANEL: CPT | Mod: ZL | Performed by: FAMILY MEDICINE

## 2022-11-16 PROCEDURE — 82306 VITAMIN D 25 HYDROXY: CPT | Mod: ZL | Performed by: FAMILY MEDICINE

## 2022-11-16 PROCEDURE — 86803 HEPATITIS C AB TEST: CPT | Mod: ZL | Performed by: FAMILY MEDICINE

## 2022-11-16 PROCEDURE — 80061 LIPID PANEL: CPT | Mod: ZL | Performed by: FAMILY MEDICINE

## 2022-11-16 PROCEDURE — 36415 COLL VENOUS BLD VENIPUNCTURE: CPT | Mod: ZL | Performed by: FAMILY MEDICINE

## 2022-11-16 PROCEDURE — 84443 ASSAY THYROID STIM HORMONE: CPT | Mod: ZL | Performed by: FAMILY MEDICINE

## 2022-11-16 PROCEDURE — 99214 OFFICE O/P EST MOD 30 MIN: CPT | Performed by: FAMILY MEDICINE

## 2022-11-16 RX ORDER — ATORVASTATIN CALCIUM 10 MG/1
10 TABLET, FILM COATED ORAL DAILY
Qty: 90 TABLET | Refills: 3 | Status: SHIPPED | OUTPATIENT
Start: 2022-11-16 | End: 2023-12-13

## 2022-11-16 RX ORDER — LACTOBACILLUS RHAMNOSUS GG 10B CELL
1 CAPSULE ORAL 2 TIMES DAILY
COMMUNITY

## 2022-11-16 SDOH — HEALTH STABILITY: PHYSICAL HEALTH: ON AVERAGE, HOW MANY MINUTES DO YOU ENGAGE IN EXERCISE AT THIS LEVEL?: 0 MIN

## 2022-11-16 SDOH — HEALTH STABILITY: PHYSICAL HEALTH: ON AVERAGE, HOW MANY DAYS PER WEEK DO YOU ENGAGE IN MODERATE TO STRENUOUS EXERCISE (LIKE A BRISK WALK)?: 0 DAYS

## 2022-11-16 ASSESSMENT — LIFESTYLE VARIABLES
HOW MANY STANDARD DRINKS CONTAINING ALCOHOL DO YOU HAVE ON A TYPICAL DAY: PATIENT DOES NOT DRINK
SKIP TO QUESTIONS 9-10: 1
AUDIT-C TOTAL SCORE: 0
HOW OFTEN DO YOU HAVE SIX OR MORE DRINKS ON ONE OCCASION: NEVER
HOW OFTEN DO YOU HAVE A DRINK CONTAINING ALCOHOL: NEVER

## 2022-11-16 ASSESSMENT — PAIN SCALES - GENERAL: PAINLEVEL: NO PAIN (0)

## 2022-11-16 NOTE — COMMUNITY RESOURCES LIST (ENGLISH)
11/16/2022   [facility] - Outpatient Clinics  N/A  For questions about this resource list or additional care needs, please contact your primary care clinic or care manager.  Phone: [phone]   Email: N/A   Address: [address] [place] [address]   Hours: N/A        Exercise and Recreation       Gym or workout facility  1  [facility] Distance: 21.38 miles      COVID-19 Status: Regular Operations   [address] [place] [address]  Language: English  Hours: Mon - Fri 5:00 AM - 7:00 PM , Sat 9:00 AM - 5:00 PM  Fees: Free, Self Pay   Phone: (336) [phone] Email: [email or web address] Website: [email or web address]/     2  [employer] - [place] Distance: 21.52 miles      COVID-19 Status: Regular Operations   [address] [place] [address]  Language: English  Hours: Mon - Sun Open 24 Hours  Fees: Insurance, Self Pay   Phone: (956) [phone] Email: [email or web address] Website: [email or web address]/[email or web address]          Important Numbers & Websites       Emergency Services   911  City Services   311  Poison Control   (555) 664-3816  Suicide Prevention Lifeline   (988) 326-5182 (TALK)  Child Abuse Hotline   (128) 472-9875 (4-A-Child)  Sexual Assault Hotline   (734) 772-1827 (HOPE)  National Runaway Safeline   (874) 954-1187 (RUNAWAY)  All-Options Talkline   (164) 114-6318  Substance Abuse Referral   (867) 446-6119 (HELP)

## 2022-11-16 NOTE — COMMUNITY RESOURCES LIST (ENGLISH)
11/16/2022   [facility] - Outpatient Clinics  N/A  For questions about this resource list or additional care needs, please contact your primary care clinic or care manager.  Phone: [phone]   Email: N/A   Address: [address] [place] [address]   Hours: N/A        Exercise and Recreation       Gym or workout facility  1  [facility] Distance: 21.38 miles      COVID-19 Status: Regular Operations   [address] [place] [address]  Language: English  Hours: Mon - Fri 5:00 AM - 7:00 PM , Sat 9:00 AM - 5:00 PM  Fees: Free, Self Pay   Phone: (920) [phone] Email: [email or web address]@[email or web address] Website: [email or web address]://www.[employer]/     2  [employer] - [place] Distance: 21.52 miles      COVID-19 Status: Regular Operations   [address] [place] [address]  Language: English  Hours: Mon - Sun Open 24 Hours  Fees: Insurance, Self Pay   Phone: (911) 272-7416 Email: [email or web address] Website: [email or web address]/[email or web address]          Important Numbers & Websites       Emergency Services   911  City Services   311  Poison Control   (575) 243-8749  Suicide Prevention Lifeline   (649) 262-9183 (TALK)  Child Abuse Hotline   (372) 223-1623 (4-A-Child)  Sexual Assault Hotline   (692) 413-8474 (HOPE)  National Runaway Safeline   (435) 798-3125 (RUNAWAY)  All-Options Talkline   (235) 602-3163  Substance Abuse Referral   (203) 608-8511 (HELP)

## 2022-11-17 LAB
DEPRECATED CALCIDIOL+CALCIFEROL SERPL-MC: 109 UG/L (ref 20–75)
HCV AB SERPL QL IA: NONREACTIVE

## 2023-01-16 DIAGNOSIS — R73.03 PREDIABETES: ICD-10-CM

## 2023-01-17 RX ORDER — METFORMIN HCL 500 MG
TABLET, EXTENDED RELEASE 24 HR ORAL
Qty: 60 TABLET | Refills: 3 | Status: SHIPPED | OUTPATIENT
Start: 2023-01-17 | End: 2023-05-16

## 2023-01-17 NOTE — TELEPHONE ENCOUNTER
Metformin      Last Written Prescription Date:  07/25/22  Last Fill Quantity: 60,   # refills: 4  Last Office Visit: 11/16/22  Future Office visit:    Next 5 appointments (look out 90 days)    Feb 16, 2023 10:30 AM  (Arrive by 10:15 AM)  SHORT with Mimi Bhatt MD  [facility] [place] ([facility] [place] ) [address]  [place] [address]  [phone]

## 2023-02-11 ENCOUNTER — APPOINTMENT (OUTPATIENT)
Dept: GENERAL RADIOLOGY | Facility: HOSPITAL | Age: 63
End: 2023-02-11
Attending: EMERGENCY MEDICINE
Payer: COMMERCIAL

## 2023-02-11 ENCOUNTER — HOSPITAL ENCOUNTER (EMERGENCY)
Facility: HOSPITAL | Age: 63
Discharge: HOME | End: 2023-02-12
Attending: EMERGENCY MEDICINE | Admitting: EMERGENCY MEDICINE
Payer: COMMERCIAL

## 2023-02-11 DIAGNOSIS — R07.9 CHEST PAIN, UNSPECIFIED TYPE: ICD-10-CM

## 2023-02-11 LAB
ALBUMIN SERPL BCG-MCNC: 4.3 G/DL (ref 3.5–5.2)
ALP SERPL-CCNC: 76 U/L (ref 40–129)
ALT SERPL W P-5'-P-CCNC: 46 U/L (ref 10–50)
ANION GAP SERPL CALCULATED.3IONS-SCNC: 12 MMOL/L (ref 7–15)
AST SERPL W P-5'-P-CCNC: 27 U/L (ref 10–50)
BASOPHILS # BLD AUTO: 0.1 10E3/UL (ref 0–0.2)
BASOPHILS NFR BLD AUTO: 1 %
BILIRUB SERPL-MCNC: 0.3 MG/DL
BUN SERPL-MCNC: 21.5 MG/DL (ref 8–23)
CALCIUM SERPL-MCNC: 9.3 MG/DL (ref 8.8–10.2)
CHLORIDE SERPL-SCNC: 103 MMOL/L (ref 98–107)
CREAT SERPL-MCNC: 0.86 MG/DL (ref 0.67–1.17)
DEPRECATED HCO3 PLAS-SCNC: 23 MMOL/L (ref 22–29)
EOSINOPHIL # BLD AUTO: 0.3 10E3/UL (ref 0–0.7)
EOSINOPHIL NFR BLD AUTO: 3 %
ERYTHROCYTE [DISTWIDTH] IN BLOOD BY AUTOMATED COUNT: 13.9 % (ref 10–15)
GFR SERPL CREATININE-BSD FRML MDRD: >90 ML/MIN/1.73M2
GLUCOSE SERPL-MCNC: 156 MG/DL (ref 70–99)
HCT VFR BLD AUTO: 40.2 % (ref 40–53)
HGB BLD-MCNC: 13.5 G/DL (ref 13.3–17.7)
IMM GRANULOCYTES # BLD: 0 10E3/UL
IMM GRANULOCYTES NFR BLD: 0 %
LYMPHOCYTES # BLD AUTO: 2.4 10E3/UL (ref 0.8–5.3)
LYMPHOCYTES NFR BLD AUTO: 30 %
MCH RBC QN AUTO: 29.5 PG (ref 26.5–33)
MCHC RBC AUTO-ENTMCNC: 33.6 G/DL (ref 31.5–36.5)
MCV RBC AUTO: 88 FL (ref 78–100)
MONOCYTES # BLD AUTO: 0.9 10E3/UL (ref 0–1.3)
MONOCYTES NFR BLD AUTO: 11 %
NEUTROPHILS # BLD AUTO: 4.4 10E3/UL (ref 1.6–8.3)
NEUTROPHILS NFR BLD AUTO: 55 %
NRBC # BLD AUTO: 0 10E3/UL
NRBC BLD AUTO-RTO: 0 /100
PLATELET # BLD AUTO: 268 10E3/UL (ref 150–450)
POTASSIUM SERPL-SCNC: 4.2 MMOL/L (ref 3.4–5.3)
PROT SERPL-MCNC: 6.8 G/DL (ref 6.4–8.3)
RBC # BLD AUTO: 4.58 10E6/UL (ref 4.4–5.9)
SODIUM SERPL-SCNC: 138 MMOL/L (ref 136–145)
TROPONIN T SERPL HS-MCNC: 7 NG/L
WBC # BLD AUTO: 8 10E3/UL (ref 4–11)

## 2023-02-11 PROCEDURE — 99284 EMERGENCY DEPT VISIT MOD MDM: CPT | Performed by: EMERGENCY MEDICINE

## 2023-02-11 PROCEDURE — 93005 ELECTROCARDIOGRAM TRACING: CPT

## 2023-02-11 PROCEDURE — 99285 EMERGENCY DEPT VISIT HI MDM: CPT | Mod: 25

## 2023-02-11 PROCEDURE — 71045 X-RAY EXAM CHEST 1 VIEW: CPT

## 2023-02-11 PROCEDURE — 80053 COMPREHEN METABOLIC PANEL: CPT | Performed by: EMERGENCY MEDICINE

## 2023-02-11 PROCEDURE — 85025 COMPLETE CBC W/AUTO DIFF WBC: CPT | Performed by: EMERGENCY MEDICINE

## 2023-02-11 PROCEDURE — 36415 COLL VENOUS BLD VENIPUNCTURE: CPT | Performed by: EMERGENCY MEDICINE

## 2023-02-11 PROCEDURE — 250N000013 HC RX MED GY IP 250 OP 250 PS 637: Performed by: EMERGENCY MEDICINE

## 2023-02-11 PROCEDURE — 93010 ELECTROCARDIOGRAM REPORT: CPT | Performed by: INTERNAL MEDICINE

## 2023-02-11 PROCEDURE — 84484 ASSAY OF TROPONIN QUANT: CPT | Performed by: EMERGENCY MEDICINE

## 2023-02-11 RX ORDER — ASPIRIN 81 MG/1
324 TABLET, CHEWABLE ORAL ONCE
Status: COMPLETED | OUTPATIENT
Start: 2023-02-11 | End: 2023-02-11

## 2023-02-11 RX ADMIN — ASPIRIN 324 MG: 81 TABLET, CHEWABLE ORAL at 23:14

## 2023-02-12 VITALS
DIASTOLIC BLOOD PRESSURE: 75 MMHG | OXYGEN SATURATION: 96 % | SYSTOLIC BLOOD PRESSURE: 113 MMHG | RESPIRATION RATE: 18 BRPM | TEMPERATURE: 97.3 F | HEART RATE: 72 BPM

## 2023-02-12 LAB
HOLD SPECIMEN: NORMAL
TROPONIN T SERPL HS-MCNC: 7 NG/L

## 2023-02-12 PROCEDURE — 36415 COLL VENOUS BLD VENIPUNCTURE: CPT | Performed by: EMERGENCY MEDICINE

## 2023-02-12 PROCEDURE — 84484 ASSAY OF TROPONIN QUANT: CPT | Performed by: EMERGENCY MEDICINE

## 2023-02-12 ASSESSMENT — ACTIVITIES OF DAILY LIVING (ADL): ADLS_ACUITY_SCORE: 35

## 2023-02-12 NOTE — ED PROVIDER NOTES
History     Chief Complaint   Patient presents with     Chest Pain     Right sided     HPI  Junior Castellanos is a 62 year old male who presents with chest pain. Onset was about 1 hour prior to arrival. Was eating cookies and drinking coffee, watching TV when it started. Was R side and radiated to the neck. After that had flushed feeling and felt warm then cold. No sweating, no nausea, no lightheadedness, no paresthesia, no dyspnea. Episode lasted about 30 minutes. Had similar episode last week but passed quickly.     PMH: DM, HLD, JAGUAR  Former smoker, denies alcohol, denies drug use  Has never had a stress test  Dental infection this year, resolved. Hospitalized for covid in 2021    Allergies:  No Known Allergies    Problem List:    Patient Active Problem List    Diagnosis Date Noted     Gastroesophageal reflux disease without esophagitis 11/16/2022     Priority: Medium     Screening for HIV (human immunodeficiency virus) 11/16/2022     Priority: Medium     Need for hepatitis C screening test 11/16/2022     Priority: Medium     JAGUAR (obstructive sleep apnea) 11/16/2022     Priority: Medium     ACP (advance care planning) 11/16/2022     Priority: Medium     Hypovitaminosis D 11/16/2022     Priority: Medium     Pneumonia due to COVID-19 virus 12/29/2021     Priority: Medium     Infection due to 2019 novel coronavirus 11/16/2021     Priority: Medium     Chronic respiratory failure with hypoxia (H) 11/16/2021     Priority: Medium     Hyperlipidemia LDL goal <100 01/17/2020     Priority: Medium     Mixed hyperlipidemia 01/17/2020     Priority: Medium     Type 2 diabetes mellitus without complication, without long-term current use of insulin (H) 11/18/2019     Priority: Medium     Uncontrolled         JAGUAR on CPAP      Priority: Medium        Past Medical History:    Past Medical History:   Diagnosis Date     Concussion without loss of consciousness 1996     Diabetes mellitus, type 2 (H) 2019     Hyperlipidemia LDL goal <100       Obesity      JAGUAR on CPAP      Shingles 2019       Past Surgical History:    Past Surgical History:   Procedure Laterality Date     COLONOSCOPY N/A 2016    due   Procedure: COLONOSCOPY;  Surgeon: Mayo Felix DO;  Location: [facility] OR       Family History:    Family History   Problem Relation Age of Onset     Other - See Comments Mother 40        plane crash     Low Back Problems Mother      Unknown/Adopted Father      Other - See Comments Brother         plane crash     Other - See Comments Brother         plane crash     Cancer Maternal Grandmother      Emphysema Maternal Grandfather      Unknown/Adopted Paternal Grandmother      Unknown/Adopted Paternal Grandfather        Social History:  Marital Status:   [2]  Social History     Tobacco Use     Smoking status: Former     Packs/day: 1.00     Years: 15.00     Pack years: 15.00     Types: Cigarettes     Start date: 1979     Quit date: 1994     Years since quittin.6     Smokeless tobacco: Never   Vaping Use     Vaping Use: Never used   Substance Use Topics     Alcohol use: No     Drug use: No        Medications:    aspirin 81 MG EC tablet  atorvastatin (LIPITOR) 10 MG tablet  blood glucose (CONTOUR NEXT TEST) test strip  lactobacillus rhamnosus, GG, (CULTURELL) capsule  metFORMIN (GLUCOPHAGE XR) 500 MG 24 hr tablet  Microlet Lancets MISC  UNABLE TO FIND  Vitamin D, Cholecalciferol, 10 MCG (400 UNIT) TABS          Review of Systems  Please seen HPI for pertinent positives and negatives. All other systems reviewed and found to be negative.   Physical Exam   BP: 138/75  Pulse: 89  Temp: 98.6  F (37  C)  Resp: 18  SpO2: 94 %      Physical Exam  HENT:      Head: Normocephalic.      Nose: Nose normal.      Mouth/Throat:      Mouth: Mucous membranes are moist.      Pharynx: Oropharynx is clear.   Eyes:      Conjunctiva/sclera: Conjunctivae normal.      Pupils: Pupils are equal, round, and reactive to light.   Cardiovascular:      Rate  and Rhythm: Normal rate and regular rhythm.      Pulses:           Radial pulses are 2+ on the right side and 2+ on the left side.      Comments: Mild R chest wall tenderness  Pulmonary:      Effort: Pulmonary effort is normal.   Abdominal:      Palpations: Abdomen is soft.      Tenderness: There is no abdominal tenderness.   Musculoskeletal:      Cervical back: Normal range of motion.      Right lower leg: No edema.      Left lower leg: No edema.   Skin:     General: Skin is warm and dry.   Neurological:      Mental Status: He is alert and oriented to person, place, and time.         ED Course              ED Course as of 02/12/23 0211   Sun Feb 12, 2023   0021 Labs reassuring. Will perform second troponin at 2 hours   0021 CXR shows bibasilar infiltrates improved from previous. Suspect scarring from previous covid infection   0028 Updated patient.  Plan to repeat troponin at 2-hour julio.  Patient has follow-up with his primary care provider in 5 days.  If second troponin is negative will order outpatient stress test.   0200 Troponin T, High Sensitivity: 7     Procedures              EKG Interpretation:      Interpreted by Karime Parmar MD  Time reviewed: 2320  Symptoms at time of EKG: chest pain   Rhythm: normal sinus   Rate: normal  Axis: normal  Ectopy: none  Conduction: normal  ST Segments/ T Waves: No ST-T wave changes  Q Waves: none  Comparison to prior: Unchanged    Clinical Impression: normal EKG          Critical Care time:  none               Results for orders placed or performed during the hospital encounter of 02/11/23 (from the past 24 hour(s))   CBC with platelets differential    Narrative    The following orders were created for panel order CBC with platelets differential.  Procedure                               Abnormality         Status                     ---------                               -----------         ------                     CBC with platelets and d...[[id number]]                       Final result                 Please view results for these tests on the individual orders.   Troponin T, High Sensitivity   Result Value Ref Range    Troponin T, High Sensitivity 7 <=22 ng/L   Comprehensive metabolic panel   Result Value Ref Range    Sodium 138 136 - 145 mmol/L    Potassium 4.2 3.4 - 5.3 mmol/L    Chloride 103 98 - 107 mmol/L    Carbon Dioxide (CO2) 23 22 - 29 mmol/L    Anion Gap 12 7 - 15 mmol/L    Urea Nitrogen 21.5 8.0 - 23.0 mg/dL    Creatinine 0.86 0.67 - 1.17 mg/dL    Calcium 9.3 8.8 - 10.2 mg/dL    Glucose 156 (H) 70 - 99 mg/dL    Alkaline Phosphatase 76 40 - 129 U/L    AST 27 10 - 50 U/L    ALT 46 10 - 50 U/L    Protein Total 6.8 6.4 - 8.3 g/dL    Albumin 4.3 3.5 - 5.2 g/dL    Bilirubin Total 0.3 <=1.2 mg/dL    GFR Estimate >90 >60 mL/min/1.73m2   [place] Draw    Narrative    The following orders were created for panel order [place] Draw.  Procedure                               Abnormality         Status                     ---------                               -----------         ------                     Extra Blue Top Tube[[id number]]                              Final result               Extra Red Top Tube[[id number]]                               Final result               Extra Green Top (Lithium...[[id number]]                      Final result               Extra Green Top (Lithium...[[id number]]                      Final result               Extra Purple Top Tube[[id number]]                            Final result                 Please view results for these tests on the individual orders.   CBC with platelets and differential   Result Value Ref Range    WBC Count 8.0 4.0 - 11.0 10e3/uL    RBC Count 4.58 4.40 - 5.90 10e6/uL    Hemoglobin 13.5 13.3 - 17.7 g/dL    Hematocrit 40.2 40.0 - 53.0 %    MCV 88 78 - 100 fL    MCH 29.5 26.5 - 33.0 pg    MCHC 33.6 31.5 - 36.5 g/dL    RDW 13.9 10.0 - 15.0 %    Platelet Count 268 150 - 450 10e3/uL    % Neutrophils 55 %    % Lymphocytes  30 %    % Monocytes 11 %    % Eosinophils 3 %    % Basophils 1 %    % Immature Granulocytes 0 %    NRBCs per 100 WBC 0 <1 /100    Absolute Neutrophils 4.4 1.6 - 8.3 10e3/uL    Absolute Lymphocytes 2.4 0.8 - 5.3 10e3/uL    Absolute Monocytes 0.9 0.0 - 1.3 10e3/uL    Absolute Eosinophils 0.3 0.0 - 0.7 10e3/uL    Absolute Basophils 0.1 0.0 - 0.2 10e3/uL    Absolute Immature Granulocytes 0.0 <=0.4 10e3/uL    Absolute NRBCs 0.0 10e3/uL   Extra Blue Top Tube   Result Value Ref Range    Hold Specimen JIC    Extra Red Top Tube   Result Value Ref Range    Hold Specimen JIC    Extra Green Top (Lithium Heparin) Tube   Result Value Ref Range    Hold Specimen JIC    Extra Green Top (Lithium Heparin) Tube   Result Value Ref Range    Hold Specimen JIC    Extra Purple Top Tube   Result Value Ref Range    Hold Specimen JIC    Troponin T, High Sensitivity   Result Value Ref Range    Troponin T, High Sensitivity 7 <=22 ng/L       Medications   aspirin (ASA) chewable tablet 324 mg (324 mg Oral Given 2/11/23 2314)       Assessments & Plan (with Medical Decision Making)     I have reviewed the nursing notes.    I have reviewed the findings, diagnosis, plan and need for follow up with the patient.    Mr Castellanos is a 62-year-old man who presents with chest pain.  Differential includes ACS, aortic emergency, PE, pneumothorax, perforated peptic ulcer, musculoskeletal pain, pleurisy, and others.  No infectious symptoms.  Pain is already resolving, no pulse deficits, no blood pressure abnormalities, no neurologic abnormalities to suggest aortic emergency.  No acute abdomen, lungs are clear.  Wells negative.  Does not meet PERC criteria due to age however I do not think this is the most likely diagnosis.  EKG is nonischemic.  Has risk factors for ACS, heart score is 4.  Troponin x2 negative.  Pain completely resolved on reevaluation.  Chest x-ray showed scarring which I suspect is due to his previous COVID infection, per read is improved from  previous imaging.  Overall reassuring work-up.  He has follow-up this week.  Will order an outpatient stress test.  Strict return precautions for recurrent pain, shortness of breath, leg swelling, vomiting, syncope, or other concerning symptoms were provided.  He expressed understanding.    Medical Decision Making  The patient's presentation is strongly suggestive of an undiagnosed new problem with uncertain diagnosis.    The patient's evaluation involved:  ordering and/or review of 3+ test(s) in this encounter (see separate area of note for details)    The patient's management involved a decision regarding hospitalization.        New Prescriptions    No medications on file       Final diagnoses:   Chest pain, unspecified type       2/11/2023   [facility] EMERGENCY [facility]PART[facility]     Karime Parmar MD  02/12/23 0211

## 2023-02-12 NOTE — ED TRIAGE NOTES
Patient reports right sided chest pain that began around 30 min ago, radiates to neck. Patient also experienced cold extremities and felt like he would pass out.     Triage Assessment     Row Name 02/11/23 1504       Triage Assessment (Adult)    Airway WDL WDL       Respiratory WDL    Respiratory WDL WDL       Skin Circulation/Temperature WDL    Skin Circulation/Temperature WDL WDL       Cardiac WDL    Cardiac WDL X;chest pain       Peripheral/Neurovascular WDL    Peripheral Neurovascular WDL WDL       Cognitive/Neuro/Behavioral WDL    Cognitive/Neuro/Behavioral WDL WDL

## 2023-02-12 NOTE — ED NOTES
Patient ambulatory after experiencing a chest pain episode roughly 50 minutes ago. Patient reports he was sitting when he experienced a sudden pain in right chest that radiated to his neck muscle. Patient nontender in area. Also experienced cold extremities and feeling faint. Reports pain now at 2/10, denies any other symptoms at this time.

## 2023-02-12 NOTE — ED NOTES
Patient reports pain is gone. Requesting water and to use the bathroom.Patient able to ambulate by self to bathroom, water given as requested. Verbalized understanding of plan of care. All questions answered.

## 2023-02-12 NOTE — DISCHARGE INSTRUCTIONS
Have stress test as soon as possible. You should be contacted to schedule Monday. Call if you do not hear back  Continue medications as prescribed  Seek medical attention for worsening pain, difficulty breathing, vomiting, leg swelling, fainting, numbness/weakness, or if you have any new or changing symptoms/concerns.

## 2023-02-15 NOTE — PROGRESS NOTES
Assessment & Plan     Type 2 diabetes mellitus with hyperglycemia, without long-term current use of insulin (H)  Doing well  Start lisinopril, check bmp next appt  Follow-up 4-5 mos  - Hemoglobin A1c; Future  - lisinopril (ZESTRIL) 2.5 MG tablet; Take 1 tablet (2.5 mg) by mouth daily    Hypovitaminosis D  pending  - Vitamin D Deficiency; Future    JAGUAR on CPAP  Using nightly    Mixed hyperlipidemia  The ASCVD Risk score (Therese DK, et al., 2019) failed to calculate for the following reasons:    The valid total cholesterol range is 130 to 320 mg/dL  Continue statin but labs are better    Need for vaccination    - TDAP VACCINE (Adacel, Boostrix)  [[id number]]    Provider  Link to [facility] Help Grid :[id number]}    Patient was agreeable to this plan and had no further questions.  There are no Patient Instructions on file for this visit.    No follow-ups on file.    Mimi Bhatt MD  [facility] - MARISABEL Hopper is a 62 year old, presenting for the following health issues:  Diabetes      HPI     ED/UC Followup:    Facility:  [facility]  Date of visit: 2/11/23  Reason for visit: Chest Pain  Current Status: resolved         Diabetes Follow-up    How often are you checking your blood sugar? Two times daily  Blood sugar testing frequency justification:  Uncontrolled diabetes  What time of day are you checking your blood sugars (select all that apply)?  Before meals and At bedtime  Have you had any blood sugars above 200?  No  Have you had any blood sugars below 70?  No    What symptoms do you notice when your blood sugar is low?  None    What concerns do you have today about your diabetes? None     Do you have any of these symptoms? (Select all that apply)  Numbness in feet    Have you had a diabetic eye exam in the last 12 months? No        BP Readings from Last 2 Encounters:   02/16/23 102/52   02/12/23 113/75     Hemoglobin A1C (%)   Date Value   02/16/2023 6.3 (H)   11/16/2022 6.8 (H)   07/25/2022 6.7 (A)  "  01/17/2020 7.8 (A)     LDL Cholesterol Calculated (mg/dL)   Date Value   11/16/2022 48   11/18/2019 70               Hyperlipidemia Follow-Up      Are you regularly taking any medication or supplement to lower your cholesterol?   Yes- Lipitor    Are you having muscle aches or other side effects that you think could be caused by your cholesterol lowering medication?  No      How many servings of fruits and vegetables do you eat daily?  2-3    On average, how many sweetened beverages do you drink each day (Examples: soda, juice, sweet tea, etc.  Do NOT count diet or artificially sweetened beverages)?   0    How many days per week do you exercise enough to make your heart beat faster? 3 or less    How many minutes a day do you exercise enough to make your heart beat faster? 9 or less    How many days per week do you miss taking your medication? 0- currently out of medication       Review of Systems   Constitutional, HEENT, cardiovascular, pulmonary, gi and gu systems are negative, except as otherwise noted.      Objective    /52   Pulse 78   Temp 97  F (36.1  C)   Ht 1.765 m (5' 9.5\")   Wt 93.4 kg (206 lb)   SpO2 97%   BMI 29.98 kg/m    Body mass index is 29.98 kg/m .  Physical Exam   GENERAL: healthy, alert and no distress  NECK: no adenopathy, no asymmetry, masses, or scars and thyroid normal to palpation  RESP: lungs clear to auscultation - no rales, rhonchi or wheezes  CV: regular rate and rhythm, normal S1 S2, no S3 or S4, no murmur, click or rub, no peripheral edema and peripheral pulses strong  ABDOMEN: soft, nontender, no hepatosplenomegaly, no masses and bowel sounds normal  MS: no gross musculoskeletal defects noted, no edema  PSYCH: mentation appears normal, affect normal/bright    Results for orders placed or performed in visit on 02/16/23   Hemoglobin A1c     Status: Abnormal   Result Value Ref Range    Estimated Average Glucose 134 mg/dL    Hemoglobin A1C 6.3 (H) <5.7 %                   "

## 2023-02-16 ENCOUNTER — OFFICE VISIT (OUTPATIENT)
Dept: FAMILY MEDICINE | Facility: OTHER | Age: 63
End: 2023-02-16
Attending: FAMILY MEDICINE
Payer: COMMERCIAL

## 2023-02-16 ENCOUNTER — LAB (OUTPATIENT)
Dept: LAB | Facility: OTHER | Age: 63
End: 2023-02-16
Payer: COMMERCIAL

## 2023-02-16 VITALS
HEIGHT: 70 IN | BODY MASS INDEX: 29.49 KG/M2 | SYSTOLIC BLOOD PRESSURE: 102 MMHG | HEART RATE: 78 BPM | OXYGEN SATURATION: 97 % | DIASTOLIC BLOOD PRESSURE: 52 MMHG | WEIGHT: 206 LBS | TEMPERATURE: 97 F

## 2023-02-16 DIAGNOSIS — G47.33 OSA ON CPAP: ICD-10-CM

## 2023-02-16 DIAGNOSIS — E78.2 MIXED HYPERLIPIDEMIA: ICD-10-CM

## 2023-02-16 DIAGNOSIS — R73.03 PREDIABETES: Primary | ICD-10-CM

## 2023-02-16 DIAGNOSIS — E55.9 HYPOVITAMINOSIS D: ICD-10-CM

## 2023-02-16 DIAGNOSIS — Z23 NEED FOR VACCINATION: ICD-10-CM

## 2023-02-16 DIAGNOSIS — R73.03 PREDIABETES: ICD-10-CM

## 2023-02-16 PROBLEM — E11.65 TYPE 2 DIABETES MELLITUS WITH HYPERGLYCEMIA, WITHOUT LONG-TERM CURRENT USE OF INSULIN (H): Status: ACTIVE | Noted: 2019-11-18

## 2023-02-16 LAB
EST. AVERAGE GLUCOSE BLD GHB EST-MCNC: 134 MG/DL
HBA1C MFR BLD: 6.3 %

## 2023-02-16 PROCEDURE — 90471 IMMUNIZATION ADMIN: CPT

## 2023-02-16 PROCEDURE — 83036 HEMOGLOBIN GLYCOSYLATED A1C: CPT | Mod: ZL

## 2023-02-16 PROCEDURE — 82306 VITAMIN D 25 HYDROXY: CPT | Mod: ZL

## 2023-02-16 PROCEDURE — 99214 OFFICE O/P EST MOD 30 MIN: CPT | Performed by: FAMILY MEDICINE

## 2023-02-16 PROCEDURE — 36415 COLL VENOUS BLD VENIPUNCTURE: CPT | Mod: ZL

## 2023-02-16 PROCEDURE — G0463 HOSPITAL OUTPT CLINIC VISIT: HCPCS | Mod: 25

## 2023-02-16 PROCEDURE — G0463 HOSPITAL OUTPT CLINIC VISIT: HCPCS

## 2023-02-16 RX ORDER — LISINOPRIL 2.5 MG/1
2.5 TABLET ORAL DAILY
Qty: 30 TABLET | Refills: 3 | Status: SHIPPED | OUTPATIENT
Start: 2023-02-16 | End: 2023-06-13

## 2023-02-16 ASSESSMENT — PAIN SCALES - GENERAL: PAINLEVEL: NO PAIN (0)

## 2023-02-17 LAB — DEPRECATED CALCIDIOL+CALCIFEROL SERPL-MC: 81 UG/L (ref 20–75)

## 2023-02-20 ENCOUNTER — TELEPHONE (OUTPATIENT)
Dept: FAMILY MEDICINE | Facility: OTHER | Age: 63
End: 2023-02-20

## 2023-02-20 DIAGNOSIS — G47.33 OSA ON CPAP: ICD-10-CM

## 2023-02-20 DIAGNOSIS — G47.33 OSA (OBSTRUCTIVE SLEEP APNEA): Primary | ICD-10-CM

## 2023-02-20 NOTE — TELEPHONE ENCOUNTER
Pt and spouse calling for sleep apnea supplies.    He has had his machine for a long time.  Updated order was sent in on 11.16.2022.    He states he never knew it was sent.    Re-pended.    They would like this sent to [facility].    Bisi Velarde RN

## 2023-02-22 ENCOUNTER — HOSPITAL ENCOUNTER (OUTPATIENT)
Dept: NUCLEAR MEDICINE | Facility: HOSPITAL | Age: 63
Setting detail: NUCLEAR MEDICINE
Discharge: HOME | End: 2023-02-22
Attending: EMERGENCY MEDICINE
Payer: COMMERCIAL

## 2023-02-22 ENCOUNTER — HOSPITAL ENCOUNTER (OUTPATIENT)
Dept: CARDIOLOGY | Facility: HOSPITAL | Age: 63
Setting detail: NUCLEAR MEDICINE
Discharge: HOME | End: 2023-02-22
Attending: EMERGENCY MEDICINE
Payer: COMMERCIAL

## 2023-02-22 ENCOUNTER — TELEPHONE (OUTPATIENT)
Dept: FAMILY MEDICINE | Facility: OTHER | Age: 63
End: 2023-02-22

## 2023-02-22 DIAGNOSIS — G47.33 OSA (OBSTRUCTIVE SLEEP APNEA): Primary | ICD-10-CM

## 2023-02-22 DIAGNOSIS — R07.9 CHEST PAIN, UNSPECIFIED TYPE: ICD-10-CM

## 2023-02-22 PROCEDURE — 93016 CV STRESS TEST SUPVJ ONLY: CPT | Performed by: INTERNAL MEDICINE

## 2023-02-22 PROCEDURE — 93018 CV STRESS TEST I&R ONLY: CPT | Performed by: INTERNAL MEDICINE

## 2023-02-22 PROCEDURE — 258N000003 HC RX IP 258 OP 636: Performed by: INTERNAL MEDICINE

## 2023-02-22 PROCEDURE — A9500 TC99M SESTAMIBI: HCPCS | Performed by: RADIOLOGY

## 2023-02-22 PROCEDURE — 78452 HT MUSCLE IMAGE SPECT MULT: CPT

## 2023-02-22 PROCEDURE — 93017 CV STRESS TEST TRACING ONLY: CPT

## 2023-02-22 PROCEDURE — 343N000001 HC RX 343: Performed by: RADIOLOGY

## 2023-02-22 RX ORDER — SODIUM CHLORIDE 9 MG/ML
INJECTION, SOLUTION INTRAVENOUS ONCE
Status: COMPLETED | OUTPATIENT
Start: 2023-02-22 | End: 2023-02-22

## 2023-02-22 RX ADMIN — Medication 31.6 MILLICURIE: at 10:03

## 2023-02-22 RX ADMIN — SODIUM CHLORIDE: 9 INJECTION, SOLUTION INTRAVENOUS at 09:51

## 2023-02-22 RX ADMIN — Medication 10.4 MILLICURIE: at 07:36

## 2023-02-23 LAB
CV BLOOD PRESSURE: 62 MMHG
CV STRESS MAX HR HE: 154
NUC STRESS EJECTION FRACTION: 66 %
RATE PRESSURE PRODUCT: NORMAL
STRESS ECHO BASELINE DIASTOLIC HE: 64
STRESS ECHO BASELINE HR: 62 BPM
STRESS ECHO BASELINE SYSTOLIC BP: 106
STRESS ECHO CALCULATED PERCENT HR: 99 %
STRESS ECHO LAST STRESS DIASTOLIC BP: 56
STRESS ECHO LAST STRESS SYSTOLIC BP: 144
STRESS ECHO POST ESTIMATED WORKLOAD: 7.1 METS
STRESS ECHO POST EXERCISE DUR MIN: 5 MIN
STRESS ECHO POST EXERCISE DUR SEC: 30 SEC
STRESS ECHO TARGET HR: 156

## 2023-02-28 ENCOUNTER — HOSPITAL ENCOUNTER (OUTPATIENT)
Dept: EDUCATION SERVICES | Facility: HOSPITAL | Age: 63
Discharge: HOME | End: 2023-02-28
Attending: NURSE PRACTITIONER | Admitting: NURSE PRACTITIONER
Payer: COMMERCIAL

## 2023-02-28 VITALS
WEIGHT: 208 LBS | BODY MASS INDEX: 30.81 KG/M2 | HEIGHT: 69 IN | SYSTOLIC BLOOD PRESSURE: 111 MMHG | OXYGEN SATURATION: 97 % | DIASTOLIC BLOOD PRESSURE: 71 MMHG | RESPIRATION RATE: 16 BRPM | HEART RATE: 69 BPM

## 2023-02-28 PROCEDURE — 999N000218 HC NO CHARGE DRC TIME CRITERIA NOT MET

## 2023-02-28 ASSESSMENT — PAIN SCALES - GENERAL
PAINLEVEL: MILD PAIN (2)
PAINLEVEL: MILD PAIN (2)
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
## 2023-02-28 NOTE — PATIENT INSTRUCTIONS
Recap of our visit:     Monitoring:  Your A1C was 6.3 on 2/16/2023. Goal is less than 7%   Next A1C: After May 16.    Check blood sugars 2 x/day.   Fasting in the morning or 2 hours after your largest meal are good times to check.     Target blood sugar: Fasting or before meals target is . 2 hours after meal <180.     Medications:   Continue:Metformin  mg one twice daily.      Healthy Eating:    Mediterranean Lifestyle: Fish/seafood 2 times a week, vegetables, fruit, nuts, legumes, whole grains, water, regular activity.      Limit higher carbohydrate foods such as: rice, breads, cereal, pasta, sweets.   Avoid sugary drinks: regular soda/pop, juice, sports drinks. (Sugar free, zero are okay).   Snacks: Try to  work on healthy, low carbohydrate food choices.   Good snack examples: meat, cheese, cottage cheese, nuts, hard boiled egg, veggies, fruit/berries, yogurt (Greek yogurt/protein).     Target Carb: Men 60 grams/meal 15-30 snacks.   Less if working towards weight loss.     Activity/Exercise:   Increase exercise as tolerated, even multiple short times during your day helps.   Adult goal is 150 minutes/week =  30 minutes 5 days of the week.   Weight: 208.    Foot exam: current- 11/2022  Eye exam: current- due in March 2023.    Great work !!     Follow up: 1 year- or sooner if you have questions or concerns. 2/28/2024 at 10 am, for annual review.    Please bring your meter/reader to your appointment.     If you have any questions or concerns, please call me at [phone] or send Codasip message.    Thank you for coming in today!  Karissa Akins, RN Diabetes Educator

## 2023-03-14 ENCOUNTER — DOCUMENTATION ONLY (OUTPATIENT)
Dept: SLEEP MEDICINE | Facility: HOSPITAL | Age: 63
End: 2023-03-14

## 2023-03-14 NOTE — PROGRESS NOTES
Chart review prior to sleep testing.    Patient Summary:  62 year old yo male who is referred for establishing care for JAGUAR and need for replacement equipment.    Patient Active Problem List    Diagnosis Date Noted     Gastroesophageal reflux disease without esophagitis 11/16/2022     Priority: Medium     Screening for HIV (human immunodeficiency virus) 11/16/2022     Priority: Medium     Need for hepatitis C screening test 11/16/2022     Priority: Medium     JAGUAR (obstructive sleep apnea) 11/16/2022     Priority: Medium     ACP (advance care planning) 11/16/2022     Priority: Medium     Hypovitaminosis D 11/16/2022     Priority: Medium     Pneumonia due to COVID-19 virus 12/29/2021     Priority: Medium     Infection due to 2019 novel coronavirus 11/16/2021     Priority: Medium     Chronic respiratory failure with hypoxia (H) 11/16/2021     Priority: Medium     Hyperlipidemia LDL goal <100 01/17/2020     Priority: Medium     Mixed hyperlipidemia 01/17/2020     Priority: Medium     Type 2 diabetes mellitus with hyperglycemia, without long-term current use of insulin (H) 11/18/2019     Priority: Medium     Uncontrolled         JAGUAR on CPAP      Priority: Medium       Current Outpatient Medications   Medication     aspirin 81 MG EC tablet     atorvastatin (LIPITOR) 10 MG tablet     blood glucose (CONTOUR NEXT TEST) test strip     lactobacillus rhamnosus (GG) (CULTURELL) capsule     lisinopril (ZESTRIL) 2.5 MG tablet     metFORMIN (GLUCOPHAGE XR) 500 MG 24 hr tablet     Microlet Lancets MISC     UNABLE TO FIND     Vitamin D, Cholecalciferol, 10 MCG (400 UNIT) TABS     No current facility-administered medications for this visit.     Pertinent PMHx of JAGUAR, chronic respiratory failure, DM II, obesity.    Prior Sleep Testing:  3/15/2013 - PSG with weight 205 lbs, BMI 29.7.  AHI 10.3, baseline SpO2 95.9%, beth 81.6%.  PLM index 23.9.  Average HR 67.2.    STOP-BANG score of 5, with unknown neck circumference.  Shade score of  "3.  BMI of Estimated body mass index is 30.72 kg/m  as calculated from the following:    Height as of 2/28/23: 1.753 m (5' 9\").    Weight as of 2/28/23: 94.3 kg (208 lb).     Per questionnaire: \"Original sleep study and cpap issued 3/15/2013. Engine life has been exceeded so new machine is needed\"    Caffeine use:  Yes for 3+ per day.  Yes for within 6 hours of bed.    Tobacco use: No    Sleep pattern:  1am - 9am, total sleep time 6-8 hours.  Time to fall asleep: ~25 minutes.  Awakenings: 0-1 times per night, 10 or less minutes to return to sleep, awake for total of 15 or less minutes per night.  Napping.  2 days per week, 40 minutes per nap.    Yes for RLS screen.  No for sleep walking.  No for dream enactment behavior.  No for bruxism.    No for morning headaches.  Yes for snoring.  Yes for observed apnea.  No for FHx of JAGUAR.    SHx:  , lives with wife, daughter and her two sons, niece    A/P:    1.)  History of mild JAGUAR  - Weight appears stable  - Recommend getting up to date CPAP download and if AHI < 10, would not see need for repeat testing and plan to proceed with replacement equipment.    ---  This note was written with the assistance of the Dragon voice-dictation technology software. The final document, although reviewed, may contain errors. For corrections, please contact the office.    Bo Armenta MD    Sleep Medicine    [facility]  - [place], [place]  o Main Office: [phone]    [facility] [place], [place]  o [address] [place] [address], [facility], [address]  o Schedule visits: [phone]  o Main Office: [phone]  o Fax: [phone]    "

## 2023-03-14 NOTE — PROGRESS NOTES
STOP LOLI       Name: Junior Castellanos MRN# [id number]   Age: 62 year old YOB: 1960     Stop Bang questionnaire completed with a score of >3 to allow for HST     Have you been told you snore loudly (louder than talking or loud enough to be heard through doors)? YES    Do you often feel tired, fatigued, or sleepy during the daytime? YES    Has anyone observed you stop breathing during your sleep? YES    Do you have or are you being treated for high blood pressure? NO    Is your BMI greater than 35? NO    Is your neck size circumference 16 inches or greater? NO    Are you over 50 years old? YES    Stop Bang Score (# of yes): 5

## 2023-03-14 NOTE — PROGRESS NOTES
SLEEP HISTORY QUESTIONNAIRE    Please describe the main reason for your sleep appointment? Original sleep study and cpap issued 3/15/2013. Engine life has been exceeded so new machine is needed.    How long has this been a problem? ?    Have you been diagnosed with a sleep problem in the past? YES    If so, what? Sleep apnea    What treatment was recommended? cpap    Have you had a sleep study in the past? YES    If yes, where and when? [place], 2/15/2013    Sleep Habits:   Do you read in bed? No  Do you eat in bed? No  Do you watch TV in bed? No  Do you work in bed? No  Do you use a phone or computer in bed? No    Is you sleep disturbed by:   Bed partner: No  Children: No  Noise: No   Pets: No  Other: no      On two or more nights per week, do you drink alcohol to help you fall asleep?NO    On two or more nights per week, do you take melatonin to help you fall asleep? NO    On two or more nights per week, do you take over the counter medicine to fall asleep?  NO    Do you take drinks with caffeine (coffee, tea, soda, energy drinks)? YES    Do you have 3 or more caffeine drinks in a day? YES    Do you have caffeine drinks within 6 hours of bedtime? YES    Do you smoke or use tobacco? NO    Do you exercise? NO    Sleep Routine:   Using a 24 Hour Clock    What time do you usually get into bed on workdays? retired    Weekend/non work days? 1am    What time do you get out of bed on workdays? retired      Weekend/non work days?9am    Do you work the evening or night shift or do your shifts rotate? NO    How long does it usually take to fall to sleep? 25 min    How many times do you wake during the night? Usually none    How much time do you feel that you are awake during the entire night? 10 min    How long does it take for you to fall back to sleep after you wake up? 15 min    Why do you think you wake up? restroom    What do you do when you wake up? restroom    How much sleep do you think you get on work nights?  ?    How much sleep do you think you get on weekends/non work days? 6-8    How much sleep do you think you need to feel your best? 8    How many days during a week do you take a nap on average? 2    What is the average length of your naps? 40 min    Do you feel better after taking a nap? YES    If you could chose the best sleep schedule for you, what time would you go to bed? 12-1am  What time would you get up? 8-9am    Do you read in bed? NO    Do you eat in bed? NO    Do you watch TV in bed? NO    Do you do work in bed? NO    Do you use a computer or phone in bed? NO    Sleep Disruptions?   Leg movements:  Do you ever have restless, crawling, aching or other unusual feelings in your legs? YES    Do you ever wake yourself by kicking your legs during the night? NO    Are the sheets and blankets messed up or tossed about when you get up? YES    Night-time behaviors:   Do you have nightmares or night terrors? NO   How often? n/a    Have you had times when you were sleep walking? NO    Have you been seen doing anything unusual while you sleep at nights? NO  What? n/a  How often? n/a    Have you ever hurt yourself or someone else while you were sleeping? NO  Please describe: n/a    Do you clench or grind your teeth during the night? no    Sleep Apnea (pauses in breathing during sleep):  Do you wake with a headache in the morning? NO  How often? n/a    Does your bed partner, family or friends ever say that you snore? YES  How many nights per week do you snore? loud  Can snoring be heard outside the bedroom? yes    Do you ever wake yourself up from snoring, gasping or choking? NO    Have you ever been told that you stop breathing or have pauses in your breathing? YES    Do you wake in the morning with a dry throat or mouth? YES    Do you have trouble breathing through your nose? YES    Do you have problems with heartburn, reflux or a hiatal hernia? NO    Which positions do you usually sleep in? (stomach, back, sides, all)  sides    Do you use oxygen or any other medical equipment when you sleep? YES    Do members of your family (related by blood) snore? NO    Have any members of your family been diagnosed with with sleep apnea? NO    Do other members of your family have restless leg? NO    Do other members of your family have sleep walking? NO    Have you ever had an accident, or near accident due to sleepiness while driving? NO    Does your sleepiness affect your work on the job or at school? NO    Do you ever fall asleep by accident while doing a task? NO    Have you had sudden muscle weakness when laughing, angry or surprised? NO    Have you ever been unable to move your body when falling asleep or waking up? NO    Do you ever have trouble  your dreams from real life events? NO  Please describe: n/a    Physical Health: (including illness and injury): During the past 30 days, on how many days was your physical health not good? 3/30 days     Mental Health: (including stress, depression, and problems with emotions): During the last 30 days, how may days was your mental health not good? 0/30 days.     During the past 30 days, on how many days did poor physical or mental health keep you from doing your usual activities? This might be self-care, work, or play? 0/30 days.     Social History:   Marital status:     Who lives in your home with you? Wife, daughter and her two sons and neice    Mother (alive or dead)? dead If has , from what? Plane crash  Father (alive or dead)? dead If has , from what? Plane crash    Siblings: YES  Have any ? YES  If so, from what? Plane crash    Currently working? NO  If yes, work: n/a  Former jobs:      Sleepiness Scale:   Sitting and reading 1   Watching TV 0   Sitting in a public place 0   Riding in a car 2   Lying down to rest in the afternoon n/a   Sitting and talking to someone 0   Sitting quietly after a lunch without alcohol 0   In a car, stopping for a few  minutes in traffic 0       Surgical History:   Past Surgical History:   Procedure Laterality Date     COLONOSCOPY N/A 05/20/2016    due 2026  Procedure: COLONOSCOPY;  Surgeon: Mayo Felix DO;  Location: [facility]       Medical Conditions:   Past Medical History:   Diagnosis Date     Concussion without loss of consciousness 1996    on ski lift -- had HAs, dizziness, memory troubles, no driving for 6 mos     Diabetes mellitus, type 2 (H) 2019     Hyperlipidemia LDL goal <100      Obesity      JAGUAR on CPAP      Shingles 2019       Medications:   Current Outpatient Medications   Medication Sig     aspirin 81 MG EC tablet Take 81 mg by mouth daily     atorvastatin (LIPITOR) 10 MG tablet Take 1 tablet (10 mg) by mouth daily     blood glucose (CONTOUR NEXT TEST) test strip USE TO TEST BLOOD SUGAR TWICE DAILY     lactobacillus rhamnosus (GG) (CULTURELL) capsule Take 1 capsule by mouth 3 times daily     lisinopril (ZESTRIL) 2.5 MG tablet Take 1 tablet (2.5 mg) by mouth daily     metFORMIN (GLUCOPHAGE XR) 500 MG 24 hr tablet TAKE 1TABLET BY MOUTH 2 TIMES DAILY (WITH MEALS)     Microlet Lancets MISC USE TO TEST 2 TIMES DAILY     UNABLE TO FIND MEDICATION NAME: Multivitamin plant base liquid, 2 Tbsp daily     Vitamin D, Cholecalciferol, 10 MCG (400 UNIT) TABS Take 800 Units by mouth daily     No current facility-administered medications for this visit.       Are you currently having any of the following symptoms?   General:   Obvious weight gain or loss NO  Fever, chills or sweats NO  Drug allergies: no    Eyes:   Changes in vision NO  Blind spots NO  Double vision NO  Other no    Ear, Nose and Throat:   Ear pain NO  Sore throat NO  Sinus pain NO  Post-nasal drip NO  Runny nose NO  Bloody nose NO    Heart:   Rapid or irregular heart beat NO  Chest pain or pressure YES  Out of breath when lying down NO  Swelling in feet or legs NO  High blood pressure NO  Heart disease NO    Nervous system   Headaches NO  Weakness in arms or  legs NO  Numbness in arms of legs YES  Other: no    Skin  Rashes NO  New moles or skin changes NO  Other no    Lungs  Shortness of breath at rest NO  Shortness of breath with activity NO  Dry cough NO  Coughing up mucous or phlegm NO  Coughing up blood NO  Wheezing when breathing NO    Lymph System  Swollen lymph nodes NO  New lumps or bumps NO  Changes in breasts or discharge NO    Digestive System   Nausea or vomiting NO  Loose or watery stools NO  Hard, dry stools (constipation) NO  Fat or grease in stools NO  Blood in stools NO  Stools are black or bloody NO  Abdominal (belly) pain NO    Urinary Tract   Pain when you urinate (pee) NO  Blood in your urine NO  Urinate (pee) more than normal NO  Irregular periods NO    Muscles and bones   Muscle pain NO  Joint or bone pain NO  Swollen joints NO  Other no    Glands  Increased thirst or urination NO  Diabetes YES  Morning glucose: 140  Afternoon glucose: 120    Mental Health  Depression NO  Anxiety NO  Other mental health issues: no

## 2023-03-24 NOTE — PROGRESS NOTES
CPAP Compliance Visit:       Name: Junior Castellanos MRN# [id number]   Age: 62 year old YOB: 1960     Date of Consultation: March 24, 2023  Primary care provider: Mimi Bhatt    Compliance:   100 % of days with >4 hours of use.   0days/30 with no use   Average use: 6hours 51minutes per day   95%ile leak 15.5 L/min   CPAP 95% pressure 16.3 cm   AHI 0.3 events/hour   ELIZA 0  PB 0%     Impression:   Patient is compliant with CPAP therapy and using CPAP with no issues. Continue to use CPAP and follow up with sleep medicine as necessary.

## 2023-03-25 ASSESSMENT — SLEEP AND FATIGUE QUESTIONNAIRES
HOW LIKELY ARE YOU TO NOD OFF OR FALL ASLEEP WHILE SITTING AND READING: SLIGHT CHANCE OF DOZING
HOW LIKELY ARE YOU TO NOD OFF OR FALL ASLEEP WHILE SITTING QUIETLY AFTER LUNCH WITHOUT ALCOHOL: WOULD NEVER DOZE
HOW LIKELY ARE YOU TO NOD OFF OR FALL ASLEEP WHILE WATCHING TV: WOULD NEVER DOZE
HOW LIKELY ARE YOU TO NOD OFF OR FALL ASLEEP WHILE SITTING INACTIVE IN A PUBLIC PLACE: WOULD NEVER DOZE
HOW LIKELY ARE YOU TO NOD OFF OR FALL ASLEEP IN A CAR, WHILE STOPPED FOR A FEW MINUTES IN TRAFFIC: WOULD NEVER DOZE
HOW LIKELY ARE YOU TO NOD OFF OR FALL ASLEEP WHEN YOU ARE A PASSENGER IN A CAR FOR AN HOUR WITHOUT A BREAK: SLIGHT CHANCE OF DOZING
HOW LIKELY ARE YOU TO NOD OFF OR FALL ASLEEP WHILE LYING DOWN TO REST IN THE AFTERNOON WHEN CIRCUMSTANCES PERMIT: MODERATE CHANCE OF DOZING
HOW LIKELY ARE YOU TO NOD OFF OR FALL ASLEEP WHILE SITTING AND TALKING TO SOMEONE: WOULD NEVER DOZE

## 2023-03-26 NOTE — PROGRESS NOTES
"Junior Castellanos is a 62 year old male who is being evaluated via a billable video visit.       The patient has been notified of following:      \"This video visit will be conducted via a call between you and your physician/provider. We have found that certain health care needs can be provided without the need for an in-person physical exam.  This service lets us provide the care you need with a video conversation.  If a prescription is necessary we can send it directly to your pharmacy.  If lab work is needed we can place an order for that and you can then stop by our lab to have the test done at a later time.     Video visits are billed at different rates depending on your insurance coverage.  Please reach out to your insurance provider with any questions.     If during the course of the call the physician/provider feels a video visit is not appropriate, you will not be charged for this service.\"     Patient has given verbal consent for Video visit? Yes  How would you like to obtain your AVS? Mail a copy  If you are dropped from the video visit, the video invite should be resent to: Text to cell phone: -  Will anyone else be joining your video visit? No  If patient encounters technical issues they should call [phone]      Video-Visit Details     Type of service:  Video Visit     Start Time: 3pm  End Time: 3:11 PM    Originating Location (pt. Location): Home     Distant Location (provider location):  On-site, [facility] [place]       Platform used for Video Visit: Relayware    Virtual visit for establishing care for history of obstructive sleep apnea managed with CPAP.     A/P:     1.)  History of mild JAGUAR  - Presenting concerns of snoring, hypersomnia  - Weight appears stable  -Appears well controlled with adequate compliance on CPAP auto titrate 11.6-20 cm H2O.  - I have placed orders for replacement CPAP on the same above settings.  - Plan for follow-up 4 to 6 weeks after receiving replacement " "CPAP.    SUBJECTIVE:  Junior Castellanos is a 62 year old male.    62 year old yo male who is referred for establishing care for JAGUAR and need for replacement equipment.    Pertinent PMHx of JAGUAR, chronic respiratory failure, DM II, obesity.     Prior Sleep Testing:    3/15/2013 - PSG with weight 205 lbs, BMI 29.7.  AHI 10.3, baseline SpO2 95.9%, beth 81.6%.  PLM index 23.9.  Average HR 67.2.     STOP-BANG score of 5, with unknown neck circumference.  [place] score of 3.  BMI of Estimated body mass index is 30.72 kg/m  as calculated from the following:    Height as of 2/28/23: 1.753 m (5' 9\").    Weight as of 2/28/23: 94.3 kg (208 lb).      Today -he presents today with a desire to get a replacement CPAP machine.  His current CPAP is original from his previous sleep testing in 2013, and has been displaying the end of motor life warning for almost 2 years.  Otherwise, he feels very well rested and sleeps fantastically with his CPAP, feels he cannot sleep without it.  He denies any residual snoring or observed apnea.    CPAP download reviewed from February 17 through March 18, 2023 on auto titrate with settings of 11.6-20 cm H2O.  Average daily usage 6 hours and 51 minutes.  Pressure median 13.2 cm H2O, 95th percentile 16.3 cm H2O.  AHI 0.3.    CPAP    Per questionnaire: \"Original sleep study and cpap issued 3/15/2013. Engine life has been exceeded so new machine is needed\"     Caffeine use:  Yes for 3+ per day.  Yes for within 6 hours of bed.     Tobacco use: No     Sleep pattern:  1am - 9am, total sleep time 6-8 hours.  Time to fall asleep: ~25 minutes.  Awakenings: 0-1 times per night, 10 or less minutes to return to sleep, awake for total of 15 or less minutes per night.  Napping.  2 days per week, 40 minutes per nap.     Yes for RLS screen.  No for sleep walking.  No for dream enactment behavior.  No for bruxism.     No for morning headaches.  Yes for snoring.  Yes for observed apnea.  No for FHx of " JAGUAR.     SHx:  , lives with wife, daughter and her two sons, niece      Past medical history:    Patient Active Problem List    Diagnosis Date Noted     Gastroesophageal reflux disease without esophagitis 11/16/2022     Priority: Medium     Screening for HIV (human immunodeficiency virus) 11/16/2022     Priority: Medium     Need for hepatitis C screening test 11/16/2022     Priority: Medium     JAGUAR (obstructive sleep apnea) 11/16/2022     Priority: Medium     ACP (advance care planning) 11/16/2022     Priority: Medium     Hypovitaminosis D 11/16/2022     Priority: Medium     Pneumonia due to COVID-19 virus 12/29/2021     Priority: Medium     Infection due to 2019 novel coronavirus 11/16/2021     Priority: Medium     Chronic respiratory failure with hypoxia (H) 11/16/2021     Priority: Medium     Hyperlipidemia LDL goal <100 01/17/2020     Priority: Medium     Mixed hyperlipidemia 01/17/2020     Priority: Medium     Type 2 diabetes mellitus with hyperglycemia, without long-term current use of insulin (H) 11/18/2019     Priority: Medium     Uncontrolled         JAGUAR on CPAP      Priority: Medium       10 point ROS of systems including Constitutional, Eyes, Respiratory, Cardiovascular, Gastroenterology, Genitourinary, Integumentary, Muscularskeletal, Psychiatric were all negative except for pertinent positives noted in my HPI.    Current Outpatient Medications   Medication Sig Dispense Refill     aspirin 81 MG EC tablet Take 81 mg by mouth daily       atorvastatin (LIPITOR) 10 MG tablet Take 1 tablet (10 mg) by mouth daily 90 tablet 3     blood glucose (CONTOUR NEXT TEST) test strip USE TO TEST BLOOD SUGAR TWICE DAILY 50 strip 3     lactobacillus rhamnosus (GG) (CULTURELL) capsule Take 1 capsule by mouth 3 times daily       lisinopril (ZESTRIL) 2.5 MG tablet Take 1 tablet (2.5 mg) by mouth daily 30 tablet 3     metFORMIN (GLUCOPHAGE XR) 500 MG 24 hr tablet TAKE 1TABLET BY MOUTH 2 TIMES DAILY (WITH MEALS) 60  tablet 3     Microlet Lancets MISC USE TO TEST 2 TIMES DAILY 100 each 11     UNABLE TO FIND MEDICATION NAME: Multivitamin plant base liquid, 2 Tbsp daily       Vitamin D, Cholecalciferol, 10 MCG (400 UNIT) TABS Take 800 Units by mouth daily         OBJECTIVE:  There were no vitals taken for this visit.    Physical Exam     ---  This note was written with the assistance of the Dragon voice-dictation technology software. The final document, although reviewed, may contain errors. For corrections, please contact the office.    Total time spent preparing to see the patient, review of chart, obtaining history and physical examination, review of sleep testing, review of treatment options, education, discussion with patient and documenting in Epic / EMR was 15 minutes.  All time involved was spent on the day of service for the patient (the same day as the patient's appointment).    Bo Armenta MD    Sleep Medicine    [place], [place]  o Main Office: [phone]    [facility], [facility] [place], [place]  o [address], [address]  o Schedule visits: [phone]  o Main Office: [phone]  o Fax: [phone]

## 2023-03-27 ENCOUNTER — VIRTUAL VISIT (OUTPATIENT)
Dept: PULMONOLOGY | Facility: OTHER | Age: 63
End: 2023-03-27
Attending: FAMILY MEDICINE
Payer: COMMERCIAL

## 2023-03-27 ENCOUNTER — TRANSFERRED RECORDS (OUTPATIENT)
Dept: HEALTH INFORMATION MANAGEMENT | Facility: CLINIC | Age: 63
End: 2023-03-27
Payer: COMMERCIAL

## 2023-03-27 VITALS — WEIGHT: 205 LBS | BODY MASS INDEX: 30.36 KG/M2 | HEIGHT: 69 IN

## 2023-03-27 DIAGNOSIS — G47.33 OSA ON CPAP: Primary | ICD-10-CM

## 2023-03-27 DIAGNOSIS — G47.10 HYPERSOMNIA: ICD-10-CM

## 2023-03-27 LAB — RETINOPATHY: NEGATIVE

## 2023-03-27 PROCEDURE — 99203 OFFICE O/P NEW LOW 30 MIN: CPT | Mod: VID | Performed by: FAMILY MEDICINE

## 2023-03-27 NOTE — NURSING NOTE
Is the patient currently in the state of [place]? YES    Visit mode:VIDEO    If the visit is dropped, the patient can be reconnected by: VIDEO VISIT: Send to e-mail at: [email or web address]    Will anyone else be joining the visit? NO      How would you like to obtain your AVS? MyChart    Are changes needed to the allergy or medication list? NO    Reason for visit: CPAP follow up-machine motor life at end

## 2023-04-19 ENCOUNTER — DOCUMENTATION ONLY (OUTPATIENT)
Dept: HOME HEALTH SERVICES | Facility: CLINIC | Age: 63
End: 2023-04-19
Payer: COMMERCIAL

## 2023-04-19 DIAGNOSIS — G47.33 OSA (OBSTRUCTIVE SLEEP APNEA): Primary | ICD-10-CM

## 2023-04-19 NOTE — PROGRESS NOTES
Patient was offered choice of vendor and chose [facility].  Patient Junior Castellanos was set up at [facility] on April 19, 2023. Patient received a Resmed Airsense 11 Pressures were set at 11-20 cm H2O.   Patient s ramp is 5 cm H2O for 20 min and FLEX/EPR is EPR, 2.  Patient received a Resmed Mask name: Quattro Fx  Full Face mask size Medium, heated tubing and heated humidifier.  Patient has the following compliance requirements: usage only  Patient has a follow up on 05/22/2023 with Dr. Armenta.    Elieser Vicente

## 2023-05-15 DIAGNOSIS — R73.03 PREDIABETES: ICD-10-CM

## 2023-05-16 RX ORDER — METFORMIN HCL 500 MG
TABLET, EXTENDED RELEASE 24 HR ORAL
Qty: 60 TABLET | Refills: 2 | Status: SHIPPED | OUTPATIENT
Start: 2023-05-16 | End: 2023-08-15

## 2023-06-03 ENCOUNTER — HEALTH MAINTENANCE LETTER (OUTPATIENT)
Age: 63
End: 2023-06-03

## 2023-06-08 ENCOUNTER — APPOINTMENT (OUTPATIENT)
Dept: GENERAL RADIOLOGY | Facility: HOSPITAL | Age: 63
End: 2023-06-08
Attending: NURSE PRACTITIONER
Payer: COMMERCIAL

## 2023-06-08 ENCOUNTER — HOSPITAL ENCOUNTER (EMERGENCY)
Facility: HOSPITAL | Age: 63
Discharge: HOME | End: 2023-06-08
Attending: NURSE PRACTITIONER | Admitting: NURSE PRACTITIONER
Payer: COMMERCIAL

## 2023-06-08 VITALS
RESPIRATION RATE: 16 BRPM | TEMPERATURE: 98.4 F | HEART RATE: 94 BPM | SYSTOLIC BLOOD PRESSURE: 119 MMHG | DIASTOLIC BLOOD PRESSURE: 76 MMHG | OXYGEN SATURATION: 96 %

## 2023-06-08 DIAGNOSIS — M54.2 NECK PAIN: Primary | ICD-10-CM

## 2023-06-08 PROCEDURE — 99213 OFFICE O/P EST LOW 20 MIN: CPT | Performed by: NURSE PRACTITIONER

## 2023-06-08 PROCEDURE — G0463 HOSPITAL OUTPT CLINIC VISIT: HCPCS

## 2023-06-08 PROCEDURE — 72040 X-RAY EXAM NECK SPINE 2-3 VW: CPT

## 2023-06-08 RX ORDER — PREDNISONE 20 MG/1
TABLET ORAL
Qty: 10 TABLET | Refills: 0 | Status: SHIPPED | OUTPATIENT
Start: 2023-06-08 | End: 2024-02-05

## 2023-06-08 RX ORDER — TIZANIDINE 2 MG/1
2 TABLET ORAL 2 TIMES DAILY PRN
Qty: 10 TABLET | Refills: 0 | Status: SHIPPED | OUTPATIENT
Start: 2023-06-08 | End: 2023-07-25

## 2023-06-08 ASSESSMENT — ENCOUNTER SYMPTOMS
SHORTNESS OF BREATH: 0
NAUSEA: 0
NECK PAIN: 1
MYALGIAS: 1
CHILLS: 0
NECK STIFFNESS: 1
DIARRHEA: 0
SORE THROAT: 0
RHINORRHEA: 0
FEVER: 0
PSYCHIATRIC NEGATIVE: 1
VOMITING: 0

## 2023-06-08 ASSESSMENT — ACTIVITIES OF DAILY LIVING (ADL): ADLS_ACUITY_SCORE: 35

## 2023-06-08 NOTE — DISCHARGE INSTRUCTIONS
Prednisone as ordered    Tizanidine as needed for muscle spasms    Rest    Gentle stretching    Follow-up with primary care provider or return to urgent care/ED with any worsening in condition or additional concerns.

## 2023-06-08 NOTE — ED PROVIDER NOTES
History     Chief Complaint   Patient presents with     Neck Pain     HPI  Junior Castellanos is a 62 year old male who presents to urgent care today ambulatory accompanied by spouse with complaints of neck pain which has been ongoing for the past 4 days.  Patient woke up 4 days ago with neck pain.  Denies any previous neck pain.  Denies any fall, injury or trauma.  Denies any recent infection.  Denies any fever, chills, nausea, vomiting, diarrhea, shortness of breath or chest pain.  Denies any bowel or bladder dysfunction.  No saddle anesthesia.  No weakness to bilateral lower extremities.  No over-the-counter medication for symptoms.  No other concerns.    Allergies:  No Known Allergies    Problem List:    Patient Active Problem List    Diagnosis Date Noted     Gastroesophageal reflux disease without esophagitis 11/16/2022     Priority: Medium     Screening for HIV (human immunodeficiency virus) 11/16/2022     Priority: Medium     Need for hepatitis C screening test 11/16/2022     Priority: Medium     JAGUAR (obstructive sleep apnea) 11/16/2022     Priority: Medium     ACP (advance care planning) 11/16/2022     Priority: Medium     Hypovitaminosis D 11/16/2022     Priority: Medium     Pneumonia due to COVID-19 virus 12/29/2021     Priority: Medium     Infection due to 2019 novel coronavirus 11/16/2021     Priority: Medium     Chronic respiratory failure with hypoxia (H) 11/16/2021     Priority: Medium     Hyperlipidemia LDL goal <100 01/17/2020     Priority: Medium     Mixed hyperlipidemia 01/17/2020     Priority: Medium     Type 2 diabetes mellitus with hyperglycemia, without long-term current use of insulin (H) 11/18/2019     Priority: Medium     Uncontrolled         JAGUAR on CPAP      Priority: Medium        Past Medical History:    Past Medical History:   Diagnosis Date     Concussion without loss of consciousness 1996     Diabetes mellitus, type 2 (H) 2019     Hyperlipidemia LDL goal <100      Obesity      JAGUAR on  CPAP      Shingles 2019       Past Surgical History:    Past Surgical History:   Procedure Laterality Date     COLONOSCOPY N/A 2016    due   Procedure: COLONOSCOPY;  Surgeon: Mayo Felix DO;  Location: [facility]       Family History:    Family History   Problem Relation Age of Onset     Other - See Comments Mother 40        plane crash     Low Back Problems Mother      Unknown/Adopted Father      Other - See Comments Brother         plane crash     Other - See Comments Brother         plane crash     Cancer Maternal Grandmother      Emphysema Maternal Grandfather      Unknown/Adopted Paternal Grandmother      Unknown/Adopted Paternal Grandfather        Social History:  Marital Status:   [2]  Social History     Tobacco Use     Smoking status: Former     Packs/day: 1.00     Years: 15.00     Pack years: 15.00     Types: Cigarettes     Start date: 1979     Quit date: 1994     Years since quittin.9     Smokeless tobacco: Never   Vaping Use     Vaping status: Never Used   Substance Use Topics     Alcohol use: No     Drug use: No        Medications:    aspirin 81 MG EC tablet  atorvastatin (LIPITOR) 10 MG tablet  lactobacillus rhamnosus (GG) (CULTURELL) capsule  lisinopril (ZESTRIL) 2.5 MG tablet  metFORMIN (GLUCOPHAGE XR) 500 MG 24 hr tablet  predniSONE (DELTASONE) 20 MG tablet  tiZANidine (ZANAFLEX) 2 MG tablet  UNABLE TO FIND  Vitamin D, Cholecalciferol, 10 MCG (400 UNIT) TABS  CONTOUR NEXT TEST test strip  Microlet Lancets MISC      Review of Systems   Constitutional: Negative for chills and fever.   HENT: Negative for congestion, ear pain, rhinorrhea and sore throat.    Respiratory: Negative for shortness of breath.    Cardiovascular: Negative for chest pain.   Gastrointestinal: Negative for diarrhea, nausea and vomiting.   Musculoskeletal: Positive for myalgias, neck pain and neck stiffness. Negative for gait problem.   Skin: Negative.    Psychiatric/Behavioral: Negative.       Physical Exam   BP: 119/76  Pulse: 94  Temp: 98.4  F (36.9  C)  Resp: 16  SpO2: 96 %    Physical Exam  Vitals and nursing note reviewed.   Constitutional:       General: He is not in acute distress.     Appearance: Normal appearance. He is not ill-appearing or toxic-appearing.   HENT:      Right Ear: Tympanic membrane, ear canal and external ear normal.      Left Ear: Tympanic membrane, ear canal and external ear normal.      Mouth/Throat:      Mouth: Mucous membranes are moist.      Pharynx: Oropharynx is clear. No oropharyngeal exudate or posterior oropharyngeal erythema.   Cardiovascular:      Rate and Rhythm: Normal rate and regular rhythm.      Pulses: Normal pulses.      Heart sounds: Normal heart sounds.   Pulmonary:      Effort: Pulmonary effort is normal.      Breath sounds: Normal breath sounds.   Abdominal:      General: Bowel sounds are normal.      Palpations: Abdomen is soft.      Tenderness: There is no abdominal tenderness.   Musculoskeletal:      Cervical back: Spasms and tenderness present. No swelling, edema, deformity, erythema, signs of trauma, lacerations, rigidity, torticollis, bony tenderness or crepitus. Pain with movement present. Decreased range of motion.      Thoracic back: Normal.   Neurological:      Mental Status: He is alert.   Psychiatric:         Mood and Affect: Mood normal.       ED Course     Results for orders placed or performed during the hospital encounter of 06/08/23 (from the past 24 hour(s))   Cervical spine XR, 2-3 views    Narrative    PROCEDURE: XR CERVICAL SPINE 2/3 VIEWS 6/8/2023 11:32 AM    HISTORY: neck pain    COMPARISONS: None.    TECHNIQUE: PA and lateral    FINDINGS: There is decreased height of the C4-C5 C5-C6 and C6-C7 discs  with  degenerative osteophytes. Cervical facet joint degenerative  changes are noted bilaterally. The prevertebral soft tissues are  normal.         Impression    IMPRESSION: Moderate degenerative changes of the cervical  spine    NEDA BARRON MD         SYSTEM ID:  [id number]       Medications - No data to display    Assessments & Plan (with Medical Decision Making)     I have reviewed the nursing notes.    I have reviewed the findings, diagnosis, plan and need for follow up with the patient.  (M54.2) Neck pain  (primary encounter diagnosis)  Plan:   Patient ambulatory with a nontoxic appearance.  Patient able to stand from a seated position and ambulate without difficulty.  Arrived with complaints of neck pain for the past 4 days after patient woke up 1 morning.  Denies any fall, injury or trauma.  No spinal tenderness.  Denies any fever, chills, nausea, vomiting, diarrhea, shortness of breath or chest pain.  Cervical spine x-ray completed which shows moderate degenerative changes of the cervical spine.  We will start patient on short course of prednisone for pain and inflammation and tizanidine as needed for muscle spasms.  Patient to follow-up with primary care provider or return to urgent care/ED with any worsening in condition or additional concerns.  Patient is in agreement with treatment plan.    Discharge Medication List as of 6/8/2023 12:27 PM      START taking these medications    Details   predniSONE (DELTASONE) 20 MG tablet Take two tablets (= 40mg) each day for 5 (five) days, Disp-10 tablet, R-0, E-Prescribe      tiZANidine (ZANAFLEX) 2 MG tablet Take 1 tablet (2 mg) by mouth 2 times daily as needed for muscle spasms, Disp-10 tablet, R-0, E-Prescribe           Final diagnoses:   Neck pain     6/8/2023   [facility]     Herlinda Martinez NP  06/08/23 [id number]

## 2023-06-12 DIAGNOSIS — R73.03 PREDIABETES: ICD-10-CM

## 2023-06-13 RX ORDER — LISINOPRIL 2.5 MG/1
TABLET ORAL
Qty: 30 TABLET | Refills: 7 | Status: SHIPPED | OUTPATIENT
Start: 2023-06-13 | End: 2024-02-05

## 2023-06-25 NOTE — PROGRESS NOTES
"Junior Castellanos is a 62 year old male who is being evaluated via in-person clinic visit.       Visit Details     In-clinic visit for CPAP compliance follow-up of mild JAGUAR with replacement CPAP.     A/P:     1.)  History of mild JAGUAR  - Presenting concerns of snoring, hypersomnia  - Weight appears stable  -Appears well controlled with excellent compliance on CPAP auto titrate 11-20 cm H2O.  - Continue CPAP on current settings.  - Follow-up in 1-2 years.    SUBJECTIVE:  Junior Castellanos is a 62 year old male.    Pertinent PMHx of JAGUAR, chronic respiratory failure, DM II, obesity.     Prior Sleep Testing:  3/15/2013 - PSG with weight 205 lbs, BMI 29.7.  AHI 10.3, baseline SpO2 95.9%, beth 81.6%.  PLM index 23.9.  Average HR 67.2.     STOP-BANG score of 5, with unknown neck circumference.  [place] score of 3.  BMI of Estimated body mass index is 30.72 kg/m  as calculated from the following:    Height as of 2/28/23: 1.753 m (5' 9\").    Weight as of 2/28/23: 94.3 kg (208 lb).      3/27/2023 -he presents today with a desire to get a replacement CPAP machine.  His current CPAP is original from his previous sleep testing in 2013, and has been displaying the end of motor life warning for almost 2 years.  Otherwise, he feels very well rested and sleeps fantastically with his CPAP, feels he cannot sleep without it.  He denies any residual snoring or observed apnea.     CPAP download reviewed from February 17 through March 18, 2023 on auto titrate with settings of 11.6-20 cm H2O.  Average daily usage 6 hours and 51 minutes.  Pressure median 13.2 cm H2O, 95th percentile 16.3 cm H2O.  AHI 0.3.    A/P to continue CPAP, replacement device on current settings of CPAP auto-titrate 11.6 - 20 cm H2O.    Today - He presents today for CPAP compliance follow-up after receiving his replacement CPAP.  He feels it is working very well, very comfortable and has no concerns today.    CPAP download from 5/27/2023 - 6/25/2023 on auto-titrate 11-20 cm " H2O.  Used 30/30 days, average usage 7 hours 3 minutes.  Pressure median 11.1 cm H2O, 95th%ile 12.3 cm H2O.  AHI 0.3.    Past medical history:    Patient Active Problem List    Diagnosis Date Noted    Gastroesophageal reflux disease without esophagitis 11/16/2022     Priority: Medium    Screening for HIV (human immunodeficiency virus) 11/16/2022     Priority: Medium    Need for hepatitis C screening test 11/16/2022     Priority: Medium    JAGUAR (obstructive sleep apnea) 11/16/2022     Priority: Medium    ACP (advance care planning) 11/16/2022     Priority: Medium    Hypovitaminosis D 11/16/2022     Priority: Medium    Pneumonia due to COVID-19 virus 12/29/2021     Priority: Medium    Infection due to 2019 novel coronavirus 11/16/2021     Priority: Medium    Chronic respiratory failure with hypoxia (H) 11/16/2021     Priority: Medium    Hyperlipidemia LDL goal <100 01/17/2020     Priority: Medium    Mixed hyperlipidemia 01/17/2020     Priority: Medium    Type 2 diabetes mellitus with hyperglycemia, without long-term current use of insulin (H) 11/18/2019     Priority: Medium     Uncontrolled        JAGUAR on CPAP      Priority: Medium       10 point ROS of systems including Constitutional, Eyes, Respiratory, Cardiovascular, Gastroenterology, Genitourinary, Integumentary, Muscularskeletal, Psychiatric were all negative except for pertinent positives noted in my HPI.    Current Outpatient Medications   Medication Sig Dispense Refill    aspirin 81 MG EC tablet Take 81 mg by mouth daily      atorvastatin (LIPITOR) 10 MG tablet Take 1 tablet (10 mg) by mouth daily 90 tablet 3    CONTOUR NEXT TEST test strip USE TO TEST BLOOD SUGAR TWICE DAILY 100 strip 1    lactobacillus rhamnosus (GG) (CULTURELL) capsule Take 1 capsule by mouth 3 times daily      lisinopril (ZESTRIL) 2.5 MG tablet TAKE 1 TABLET BY MOUTH DAILY 30 tablet 7    metFORMIN (GLUCOPHAGE XR) 500 MG 24 hr tablet TAKE 1 TABLET BY MOUTH 2 TIMES DAILY (WITH MEALS) 60  tablet 2    Microlet Lancets MISC USE TO TEST 2 TIMES DAILY 100 each 3    predniSONE (DELTASONE) 20 MG tablet Take two tablets (= 40mg) each day for 5 (five) days 10 tablet 0    tiZANidine (ZANAFLEX) 2 MG tablet Take 1 tablet (2 mg) by mouth 2 times daily as needed for muscle spasms 10 tablet 0    UNABLE TO FIND MEDICATION NAME: Multivitamin plant base liquid, 2 Tbsp daily      Vitamin D, Cholecalciferol, 10 MCG (400 UNIT) TABS Take 800 Units by mouth daily         OBJECTIVE:  There were no vitals taken for this visit.    Physical Exam     ---  This note was written with the assistance of the Dragon voice-dictation technology software. The final document, although reviewed, may contain errors. For corrections, please contact the office.    Total time spent preparing to see the patient, review of chart, obtaining history and physical examination, review of sleep testing, review of treatment options, education, discussion with patient and documenting in Epic / EMR was 15 minutes.  All time involved was spent on the day of service for the patient (the same day as the patient's appointment).    Bo Armenta MD    Sleep Medicine  [place], [place]  Main Office: [phone]  [place]  [address] [place] [address], [address]  Schedule visits: [phone]  Main Office: [phone]  Fax: [phone]

## 2023-06-26 ENCOUNTER — OFFICE VISIT (OUTPATIENT)
Dept: PULMONOLOGY | Facility: OTHER | Age: 63
End: 2023-06-26
Attending: FAMILY MEDICINE
Payer: COMMERCIAL

## 2023-06-26 DIAGNOSIS — G47.33 OSA (OBSTRUCTIVE SLEEP APNEA): Primary | ICD-10-CM

## 2023-06-26 PROCEDURE — 99213 OFFICE O/P EST LOW 20 MIN: CPT | Performed by: FAMILY MEDICINE

## 2023-06-26 PROCEDURE — G0463 HOSPITAL OUTPT CLINIC VISIT: HCPCS | Performed by: FAMILY MEDICINE

## 2023-06-26 NOTE — PROGRESS NOTES
Sleep Medicine Clinic Rooming Note    Patient was identified appropriately by name and date of birth.    Medication Reconciliation: complete    Chief complaint: compliance visit      Height: 69 ft/inches  Weight: 210 lbs  SpO2: 97  HR: 73  BP: 100/60  Neck circumference: 15 inches     [place] Sleepiness Scale    How likely are you to doze off or fall asleep in the following situations, in contrast to just feeling tired?    This refers to your usual way of life recently.    Even if you haven't done some of these things recently, try to figure out how they would have affected you.    Use the following scale to choose the most appropriate number for each situation:  0 = NO CHANCE of dozing  1 = SLIGHT CHANCE of dozing  2 = MODERATE CHANCE of dozing  3 = HIGH CHANCE of dozing    Sitting and Readin  Watching television: 1  Sitting inactive in a public place:0  Riding in car:1  Laying down to rest in the afternoon:2  Sitting and talking to someone:0  Sitting quietly after lunch without alcohol:0  In a car, stopped in traffic for a few minutes:0    Score: 4    DME needs: -    Additional Notes: -    '

## 2023-06-26 NOTE — PROGRESS NOTES
CPAP Compliance Visit:       Name: Junior Castellanos MRN# [id number]   Age: 62 year old YOB: 1960     Date of Consultation: June 26, 2023  Primary care provider: Mimi Bhatt    Compliance:   97 % of days with >4 hours of use.   0days/30 with no use   Average use: 7hours 3minutes per day   95%ile leak 8.6 L/min   CPAP 95% pressure 12.3 cm   AHI 0.3 events/hour   ELIZA 0.1  PB 0%     Impression:   Patient is compliant with CPAP therapy and using CPAP with no issues. Continue to use CPAP and follow up with sleep medicine as necessary.

## 2023-07-25 ENCOUNTER — HOSPITAL ENCOUNTER (EMERGENCY)
Facility: HOSPITAL | Age: 63
Discharge: HOME | End: 2023-07-25
Attending: PHYSICIAN ASSISTANT | Admitting: PHYSICIAN ASSISTANT
Payer: COMMERCIAL

## 2023-07-25 ENCOUNTER — APPOINTMENT (OUTPATIENT)
Dept: GENERAL RADIOLOGY | Facility: HOSPITAL | Age: 63
End: 2023-07-25
Attending: PHYSICIAN ASSISTANT
Payer: COMMERCIAL

## 2023-07-25 VITALS
OXYGEN SATURATION: 96 % | TEMPERATURE: 99.1 F | HEART RATE: 82 BPM | RESPIRATION RATE: 16 BRPM | SYSTOLIC BLOOD PRESSURE: 121 MMHG | DIASTOLIC BLOOD PRESSURE: 70 MMHG

## 2023-07-25 DIAGNOSIS — M54.12 CERVICAL RADICULOPATHY: ICD-10-CM

## 2023-07-25 PROCEDURE — G0463 HOSPITAL OUTPT CLINIC VISIT: HCPCS | Mod: 25

## 2023-07-25 PROCEDURE — 71046 X-RAY EXAM CHEST 2 VIEWS: CPT

## 2023-07-25 PROCEDURE — 99213 OFFICE O/P EST LOW 20 MIN: CPT | Performed by: PHYSICIAN ASSISTANT

## 2023-07-25 RX ORDER — TIZANIDINE 2 MG/1
2 TABLET ORAL 2 TIMES DAILY PRN
Qty: 10 TABLET | Refills: 0 | Status: SHIPPED | OUTPATIENT
Start: 2023-07-25 | End: 2023-08-30

## 2023-07-25 ASSESSMENT — ENCOUNTER SYMPTOMS
PSYCHIATRIC NEGATIVE: 1
CONSTITUTIONAL NEGATIVE: 1
FEVER: 0
NUMBNESS: 1
RESPIRATORY NEGATIVE: 1

## 2023-07-25 ASSESSMENT — ACTIVITIES OF DAILY LIVING (ADL): ADLS_ACUITY_SCORE: 33

## 2023-07-25 NOTE — DISCHARGE INSTRUCTIONS
Voltaren topically (especially the neck and elbow).  Trial of voltaren topically.   Gentle stretches as long as they don't hurt.   Back here/ER with severe pain, fevers, aches/joint pains all over.

## 2023-07-25 NOTE — ED TRIAGE NOTES
Pt presents with c/o right shoulder pain  Has had pain intermittently for a few weeks.   States that it does radiate up the neck and some time over his right del toro.  Today it radiated down his arm making his hand numb and his elbow hurt.  His hand isn't numb any more. But the elbow pain is still there.     No otc meds taken

## 2023-07-25 NOTE — ED PROVIDER NOTES
History     Chief Complaint   Patient presents with    Arm Pain    Chest Wall Pain     HPI  Junior Castellanos is a 62 year old male with Hx DM who presents with right arm pain, anterior chest/shoulder pain, right elbow pain, for about 1 week. He developed tingling/numbness in the fingers today, prompting visit. Sx resolved prior to being roomed. He has only taken baby ASA today. There are no particular positions that make pain better/worse. He can reproduce chest pain by pushing on chest wall. No shingles. No tick bite/fevers. He shoveled a 4x8 area of dirt just prior to onset, but no memorable injury.     Allergies:  No Known Allergies    Problem List:    Patient Active Problem List    Diagnosis Date Noted    Gastroesophageal reflux disease without esophagitis 11/16/2022     Priority: Medium    Screening for HIV (human immunodeficiency virus) 11/16/2022     Priority: Medium    Need for hepatitis C screening test 11/16/2022     Priority: Medium    JAGUAR (obstructive sleep apnea) 11/16/2022     Priority: Medium    ACP (advance care planning) 11/16/2022     Priority: Medium    Hypovitaminosis D 11/16/2022     Priority: Medium    Pneumonia due to COVID-19 virus 12/29/2021     Priority: Medium    Infection due to 2019 novel coronavirus 11/16/2021     Priority: Medium    Chronic respiratory failure with hypoxia (H) 11/16/2021     Priority: Medium    Hyperlipidemia LDL goal <100 01/17/2020     Priority: Medium    Mixed hyperlipidemia 01/17/2020     Priority: Medium    Type 2 diabetes mellitus with hyperglycemia, without long-term current use of insulin (H) 11/18/2019     Priority: Medium     Uncontrolled        JAGUAR on CPAP      Priority: Medium        Past Medical History:    Past Medical History:   Diagnosis Date    Concussion without loss of consciousness 1996    Diabetes mellitus, type 2 (H) 2019    Hyperlipidemia LDL goal <100     Obesity     JAGUAR on CPAP     Shingles 2019       Past Surgical History:    Past Surgical  History:   Procedure Laterality Date    COLONOSCOPY N/A 2016    due   Procedure: COLONOSCOPY;  Surgeon: Mayo Felix DO;  Location: [facility]       Family History:    Family History   Problem Relation Age of Onset    Other - See Comments Mother 40        plane crash    Low Back Problems Mother     Unknown/Adopted Father     Other - See Comments Brother         plane crash    Other - See Comments Brother         plane crash    Cancer Maternal Grandmother     Emphysema Maternal Grandfather     Unknown/Adopted Paternal Grandmother     Unknown/Adopted Paternal Grandfather        Social History:  Marital Status:   [2]  Social History     Tobacco Use    Smoking status: Former     Packs/day: 1.00     Years: 15.00     Pack years: 15.00     Types: Cigarettes     Start date: 1979     Quit date: 1994     Years since quittin.0    Smokeless tobacco: Never   Vaping Use    Vaping Use: Never used   Substance Use Topics    Alcohol use: No    Drug use: No        Medications:    diclofenac (VOLTAREN) 1 % topical gel  tiZANidine (ZANAFLEX) 2 MG tablet  aspirin 81 MG EC tablet  atorvastatin (LIPITOR) 10 MG tablet  CONTOUR NEXT TEST test strip  lactobacillus rhamnosus (GG) (CULTURELL) capsule  lisinopril (ZESTRIL) 2.5 MG tablet  metFORMIN (GLUCOPHAGE XR) 500 MG 24 hr tablet  Microlet Lancets MISC  predniSONE (DELTASONE) 20 MG tablet  UNABLE TO FIND  Vitamin D, Cholecalciferol, 10 MCG (400 UNIT) TABS          Review of Systems   Constitutional: Negative.  Negative for fever.   Respiratory: Negative.     Cardiovascular:  Positive for chest pain (chest wall).   Skin: Negative.  Negative for rash.   Neurological:  Positive for numbness (resolved PTA).   Psychiatric/Behavioral: Negative.         Physical Exam   BP: 121/70  Pulse: 82  Temp: 99.1  F (37.3  C)  Resp: 16  SpO2: 96 %      Physical Exam  Vitals and nursing note reviewed.   Constitutional:       General: He is not in acute distress.     Appearance:  He is not toxic-appearing.   Cardiovascular:      Rate and Rhythm: Normal rate.   Pulmonary:      Effort: Pulmonary effort is normal.   Chest:          Comments: TTP as indicated by X  Musculoskeletal:      Comments: > pain into shoulder with fwd flex neck.     No change in pain with shoulder ROM/NFL sign/reach-across.     Nontender elbow.    Skin:     General: Skin is warm and dry.   Neurological:      Mental Status: He is alert and oriented to person, place, and time.         ED Course         Results for orders placed or performed during the hospital encounter of 07/25/23 (from the past 24 hour(s))   Chest XR,  PA & LAT    Narrative    Exam:  XR CHEST 2 VIEWS    HISTORY: anterior chest wall pain.    COMPARISON:  2/11/2023, 12/30/2021, 11/16/2021    FINDINGS:     The cardiomediastinal contours are normal.      Bibasilar streaky opacities, similar to the prior exam. No new focal  consolidation. No pleural effusion or pneumothorax.      No acute osseous abnormality.       Impression    IMPRESSION:      Bibasilar streaky opacities which are unchanged and may be due to  atelectasis or scarring. No new focal consolidation.      NURIA MORALES MD         SYSTEM ID:  [id number]       Medications - No data to display    Assessments & Plan (with Medical Decision Making)     I have reviewed the nursing notes.  I have reviewed the findings, diagnosis, plan and need for follow up with the patient.    Discharge Medication List as of 7/25/2023  4:17 PM        START taking these medications    Details   diclofenac (VOLTAREN) 1 % topical gel Apply 4 g topically 4 times daily, Disp-100 g, R-0, E-Prescribe             Final diagnoses:   Cervical radiculopathy   Chest pain reproducible with palpation, possibly related to shoveling prior to onset. His most recent Sx of down the right arm consistent with radiculopathy. His numbness/tingling in the fingers which have since resolved and he could reproduce pain into the elbow with ROM of  the neck. He did have imaging done of [facility] last month which revealed decreased height of the C4-C5 C5-C6 and C6-C7 discs with degenerative osteophytes. We discussed Tx options, and he will trial voltaren in addition to zanaflex and mindful ROM/stretch. Discussed need to be seen prior to FP visit with any worsening, persistent CP, fevers.     7/25/2023   [facility]       Jelani Chavez PA  07/25/23 0273

## 2023-07-25 NOTE — ED TRIAGE NOTES
At 1000 this morning had some shoulder pain. Ongoing for ab few weeks.  Not sure if its a pulled muscle.  About half hour after the jolt of pain this moring hand felt like it went numb and pain in the elbow.   Few days ago had some neck pain in the right side  reports when he takes a deep breath gets a pain in the right chest area.

## 2023-08-28 DIAGNOSIS — M54.12 CERVICAL RADICULOPATHY: Primary | ICD-10-CM

## 2023-08-29 NOTE — TELEPHONE ENCOUNTER
Zanaflex - ED provider      Last Written Prescription Date:  7.25.23  Last Fill Quantity: #10,   # refills: 0  Last Office Visit: 2.16.23  Future Office visit:    Next 5 appointments (look out 90 days)      Oct 04, 2023  3:45 PM  (Arrive by 3:30 PM)  PHYSICAL with Mimi Bhatt MD  [facility] [place] ([facility] [place] ) [address]  [place] [address]  [phone]             Routing refill request to provider for review/approval because:  Drug not on the [facility], [facility] or [facility] refill protocol or controlled substance

## 2023-08-30 RX ORDER — TIZANIDINE 2 MG/1
2 TABLET ORAL 2 TIMES DAILY PRN
Qty: 40 TABLET | Refills: 1 | Status: SHIPPED | OUTPATIENT
Start: 2023-08-30

## 2023-09-05 DIAGNOSIS — R73.03 PREDIABETES: ICD-10-CM

## 2023-09-05 NOTE — TELEPHONE ENCOUNTER
Test strips      Last Written Prescription Date:  05/30/23  Last Fill Quantity: 100,   # refills: 1  Last Office Visit: 02/16/23  Future Office visit:    Next 5 appointments (look out 90 days)      Oct 04, 2023  3:45 PM  (Arrive by 3:30 PM)  PHYSICAL with Mimi Bhatt MD  [facility] [place] ([facility] [place] ) [address]  [place] [address]  [phone]

## 2023-09-11 DIAGNOSIS — R73.03 PREDIABETES: ICD-10-CM

## 2023-09-13 NOTE — TELEPHONE ENCOUNTER
metFORMIN (GLUCOPHAGE XR) 500 MG 24 hr tablet 60 tablet 0 8/15/2023     Last Office Visit: 02/16/23     Future Office visit:    Next 5 appointments (look out 90 days)      Oct 04, 2023  3:45 PM  (Arrive by 3:30 PM)  PHYSICAL with Mimi Bhatt MD  [facility] [place] ([facility] [place] ) [address]  [place] [address]  [phone]             Routing refill request to provider for review/approval because:

## 2023-09-14 RX ORDER — METFORMIN HCL 500 MG
TABLET, EXTENDED RELEASE 24 HR ORAL
Qty: 60 TABLET | Refills: 11 | Status: SHIPPED | OUTPATIENT
Start: 2023-09-14 | End: 2024-02-06

## 2023-10-03 ASSESSMENT — ENCOUNTER SYMPTOMS
FEVER: 0
NAUSEA: 0
HEARTBURN: 0
HEMATURIA: 0
CHILLS: 0
HEMATOCHEZIA: 0
SORE THROAT: 0
COUGH: 0
ABDOMINAL PAIN: 0
PALPITATIONS: 0
CONSTIPATION: 0
WEAKNESS: 0
PARESTHESIAS: 0
NERVOUS/ANXIOUS: 0
EYE PAIN: 0
DYSURIA: 0
FREQUENCY: 0
SHORTNESS OF BREATH: 0
DIARRHEA: 0
MYALGIAS: 0
DIZZINESS: 0
HEADACHES: 0
JOINT SWELLING: 0
ARTHRALGIAS: 1

## 2023-10-04 ENCOUNTER — LAB (OUTPATIENT)
Dept: LAB | Facility: OTHER | Age: 63
End: 2023-10-04
Attending: FAMILY MEDICINE
Payer: COMMERCIAL

## 2023-10-04 ENCOUNTER — OFFICE VISIT (OUTPATIENT)
Dept: FAMILY MEDICINE | Facility: OTHER | Age: 63
End: 2023-10-04
Attending: FAMILY MEDICINE
Payer: COMMERCIAL

## 2023-10-04 VITALS
WEIGHT: 207.4 LBS | TEMPERATURE: 97.9 F | HEART RATE: 80 BPM | SYSTOLIC BLOOD PRESSURE: 110 MMHG | HEIGHT: 69 IN | BODY MASS INDEX: 30.72 KG/M2 | DIASTOLIC BLOOD PRESSURE: 70 MMHG | OXYGEN SATURATION: 96 % | RESPIRATION RATE: 19 BRPM

## 2023-10-04 DIAGNOSIS — Z00.00 ANNUAL PHYSICAL EXAM: Primary | ICD-10-CM

## 2023-10-04 DIAGNOSIS — E78.2 MIXED HYPERLIPIDEMIA: ICD-10-CM

## 2023-10-04 DIAGNOSIS — E78.5 HYPERLIPIDEMIA LDL GOAL <100: ICD-10-CM

## 2023-10-04 DIAGNOSIS — R73.03 PREDIABETES: ICD-10-CM

## 2023-10-04 DIAGNOSIS — E55.9 HYPOVITAMINOSIS D: ICD-10-CM

## 2023-10-04 DIAGNOSIS — Z12.5 SCREENING FOR PROSTATE CANCER: ICD-10-CM

## 2023-10-04 LAB
ALBUMIN SERPL BCG-MCNC: 4.7 G/DL (ref 3.5–5.2)
ALP SERPL-CCNC: 56 U/L (ref 40–129)
ALT SERPL W P-5'-P-CCNC: 28 U/L (ref 0–70)
ANION GAP SERPL CALCULATED.3IONS-SCNC: 10 MMOL/L (ref 7–15)
AST SERPL W P-5'-P-CCNC: 19 U/L (ref 0–45)
BILIRUB SERPL-MCNC: 0.3 MG/DL
BUN SERPL-MCNC: 14.6 MG/DL (ref 8–23)
CALCIUM SERPL-MCNC: 9.3 MG/DL (ref 8.8–10.2)
CHLORIDE SERPL-SCNC: 104 MMOL/L (ref 98–107)
CHOLEST SERPL-MCNC: 102 MG/DL
CREAT SERPL-MCNC: 0.92 MG/DL (ref 0.67–1.17)
DEPRECATED HCO3 PLAS-SCNC: 25 MMOL/L (ref 22–29)
EGFRCR SERPLBLD CKD-EPI 2021: >90 ML/MIN/1.73M2
EST. AVERAGE GLUCOSE BLD GHB EST-MCNC: 151 MG/DL
GLUCOSE SERPL-MCNC: 184 MG/DL (ref 70–99)
HBA1C MFR BLD: 6.9 %
HDLC SERPL-MCNC: 41 MG/DL
HOLD SPECIMEN: NORMAL
HOLD SPECIMEN: NORMAL
LDLC SERPL CALC-MCNC: 46 MG/DL
NONHDLC SERPL-MCNC: 61 MG/DL
POTASSIUM SERPL-SCNC: 4.5 MMOL/L (ref 3.4–5.3)
PROT SERPL-MCNC: 7 G/DL (ref 6.4–8.3)
PSA SERPL DL<=0.01 NG/ML-MCNC: 0.73 NG/ML (ref 0–4.5)
SODIUM SERPL-SCNC: 139 MMOL/L (ref 135–145)
TRIGL SERPL-MCNC: 76 MG/DL

## 2023-10-04 PROCEDURE — 83036 HEMOGLOBIN GLYCOSYLATED A1C: CPT | Mod: ZL | Performed by: FAMILY MEDICINE

## 2023-10-04 PROCEDURE — 82310 ASSAY OF CALCIUM: CPT | Mod: ZL | Performed by: FAMILY MEDICINE

## 2023-10-04 PROCEDURE — 99396 PREV VISIT EST AGE 40-64: CPT | Performed by: FAMILY MEDICINE

## 2023-10-04 PROCEDURE — G0103 PSA SCREENING: HCPCS | Mod: ZL | Performed by: FAMILY MEDICINE

## 2023-10-04 PROCEDURE — 80061 LIPID PANEL: CPT | Mod: ZL | Performed by: FAMILY MEDICINE

## 2023-10-04 PROCEDURE — G0463 HOSPITAL OUTPT CLINIC VISIT: HCPCS

## 2023-10-04 PROCEDURE — 36415 COLL VENOUS BLD VENIPUNCTURE: CPT | Mod: ZL | Performed by: FAMILY MEDICINE

## 2023-10-04 PROCEDURE — 36415 COLL VENOUS BLD VENIPUNCTURE: CPT | Mod: ZL

## 2023-10-04 PROCEDURE — 99213 OFFICE O/P EST LOW 20 MIN: CPT | Mod: 25 | Performed by: FAMILY MEDICINE

## 2023-10-04 PROCEDURE — 82374 ASSAY BLOOD CARBON DIOXIDE: CPT | Mod: ZL | Performed by: FAMILY MEDICINE

## 2023-10-04 SDOH — HEALTH STABILITY: PHYSICAL HEALTH: ON AVERAGE, HOW MANY DAYS PER WEEK DO YOU ENGAGE IN MODERATE TO STRENUOUS EXERCISE (LIKE A BRISK WALK)?: 0 DAYS

## 2023-10-04 SDOH — HEALTH STABILITY: PHYSICAL HEALTH: ON AVERAGE, HOW MANY MINUTES DO YOU ENGAGE IN EXERCISE AT THIS LEVEL?: 0 MIN

## 2023-10-04 ASSESSMENT — PATIENT HEALTH QUESTIONNAIRE - PHQ9
10. IF YOU CHECKED OFF ANY PROBLEMS, HOW DIFFICULT HAVE THESE PROBLEMS MADE IT FOR YOU TO DO YOUR WORK, TAKE CARE OF THINGS AT HOME, OR GET ALONG WITH OTHER PEOPLE: NOT DIFFICULT AT ALL
SUM OF ALL RESPONSES TO PHQ QUESTIONS 1-9: 2
SUM OF ALL RESPONSES TO PHQ QUESTIONS 1-9: 2

## 2023-10-04 ASSESSMENT — ANXIETY QUESTIONNAIRES
7. FEELING AFRAID AS IF SOMETHING AWFUL MIGHT HAPPEN: NOT AT ALL
GAD7 TOTAL SCORE: 0
2. NOT BEING ABLE TO STOP OR CONTROL WORRYING: NOT AT ALL
IF YOU CHECKED OFF ANY PROBLEMS ON THIS QUESTIONNAIRE, HOW DIFFICULT HAVE THESE PROBLEMS MADE IT FOR YOU TO DO YOUR WORK, TAKE CARE OF THINGS AT HOME, OR GET ALONG WITH OTHER PEOPLE: NOT DIFFICULT AT ALL
GAD7 TOTAL SCORE: 0
5. BEING SO RESTLESS THAT IT IS HARD TO SIT STILL: NOT AT ALL
1. FEELING NERVOUS, ANXIOUS, OR ON EDGE: NOT AT ALL
3. WORRYING TOO MUCH ABOUT DIFFERENT THINGS: NOT AT ALL
6. BECOMING EASILY ANNOYED OR IRRITABLE: NOT AT ALL
4. TROUBLE RELAXING: NOT AT ALL

## 2023-10-04 ASSESSMENT — ENCOUNTER SYMPTOMS
HEARTBURN: 0
JOINT SWELLING: 0
FREQUENCY: 0
EYE PAIN: 0
CHILLS: 0
NAUSEA: 0
PALPITATIONS: 0
DYSURIA: 0
MYALGIAS: 0
HEMATOCHEZIA: 0
FEVER: 0
NERVOUS/ANXIOUS: 0
PARESTHESIAS: 0
ABDOMINAL PAIN: 0
SORE THROAT: 0
COUGH: 0
CONSTIPATION: 0
DIZZINESS: 0
DIARRHEA: 0
SHORTNESS OF BREATH: 0
HEMATURIA: 0
HEADACHES: 0
ARTHRALGIAS: 1
WEAKNESS: 0

## 2023-10-04 ASSESSMENT — LIFESTYLE VARIABLES
HOW MANY STANDARD DRINKS CONTAINING ALCOHOL DO YOU HAVE ON A TYPICAL DAY: PATIENT DOES NOT DRINK
HOW OFTEN DO YOU HAVE SIX OR MORE DRINKS ON ONE OCCASION: NEVER
AUDIT-C TOTAL SCORE: 0
SKIP TO QUESTIONS 9-10: 1
HOW OFTEN DO YOU HAVE A DRINK CONTAINING ALCOHOL: NEVER

## 2023-10-04 ASSESSMENT — PAIN SCALES - GENERAL: PAINLEVEL: NO PAIN (1)

## 2023-10-04 NOTE — PROGRESS NOTES
SUBJECTIVE:   CC: Ward is an 62 year old who presents for preventative health visit.       10/4/2023     3:23 PM   Additional Questions   Roomed by ADRIANNA Frye   Accompanied by wife       Healthy Habits:     Getting at least 3 servings of Calcium per day:  Yes    Bi-annual eye exam:  Yes    Dental care twice a year:  Yes    Sleep apnea or symptoms of sleep apnea:  Sleep apnea    Diet:  Diabetic and Carbohydrate counting    Frequency of exercise:  None    Taking medications regularly:  Yes    Medication side effects:  None    Additional concerns today:  No        Diabetes Follow-up    How often are you checking your blood sugar? Two times daily  Blood sugar testing frequency justification:  Uncontrolled diabetes  What time of day are you checking your blood sugars (select all that apply)?  Before and after meals  Have you had any blood sugars above 200?  No  Have you had any blood sugars below 70?  No  What symptoms do you notice when your blood sugar is low?  None  What concerns do you have today about your diabetes? None   Do you have any of these symptoms? (Select all that apply)  Numbness in feet      BP Readings from Last 2 Encounters:   10/04/23 110/70   23 121/70     Hemoglobin A1C (%)   Date Value   2023 6.3 (H)   2022 6.8 (H)   2022 6.7 (A)   2020 7.8 (A)     LDL Cholesterol Calculated (mg/dL)   Date Value   2022 48   2019 70             Hyperlipidemia Follow-Up    Are you regularly taking any medication or supplement to lower your cholesterol?   Yes- lipitor  Are you having muscle aches or other side effects that you think could be caused by your cholesterol lowering medication?  No            Social History     Tobacco Use    Smoking status: Former     Packs/day: 1.00     Years: 15.00     Pack years: 15.00     Types: Cigarettes     Start date: 1979     Quit date: 1994     Years since quittin.2     Passive exposure: Never    Smokeless tobacco: Never    Substance Use Topics    Alcohol use: No             10/3/2023     1:37 PM   Alcohol Use   Prescreen: >3 drinks/day or >7 drinks/week? No   Last PSA: No results found for: PSA    Reviewed orders with patient. Reviewed health maintenance and updated orders accordingly - Yes  Lab work is in process  Labs reviewed in EPIC  BP Readings from Last 3 Encounters:   10/04/23 110/70   23 121/70   23 119/76    Wt Readings from Last 3 Encounters:   10/04/23 94.1 kg (207 lb 6.4 oz)   23 93 kg (205 lb)   23 94.3 kg (208 lb)                  Patient Active Problem List   Diagnosis    JAGUAR on CPAP    Type 2 diabetes mellitus with hyperglycemia, without long-term current use of insulin (H)    Hyperlipidemia LDL goal <100    Infection due to 2019 novel coronavirus    Chronic respiratory failure with hypoxia (H)    Mixed hyperlipidemia    Pneumonia due to COVID-19 virus    Gastroesophageal reflux disease without esophagitis    Screening for HIV (human immunodeficiency virus)    Need for hepatitis C screening test    JAGUAR (obstructive sleep apnea)    ACP (advance care planning)    Hypovitaminosis D     Past Surgical History:   Procedure Laterality Date    COLONOSCOPY N/A 2016    due   Procedure: COLONOSCOPY;  Surgeon: Mayo Felix DO;  Location: [facility]       Social History     Tobacco Use    Smoking status: Former     Packs/day: 1.00     Years: 15.00     Pack years: 15.00     Types: Cigarettes     Start date: 1979     Quit date: 1994     Years since quittin.2     Passive exposure: Never    Smokeless tobacco: Never   Substance Use Topics    Alcohol use: No     Family History   Problem Relation Age of Onset    Other - See Comments Mother 40        plane crash    Low Back Problems Mother     Unknown/Adopted Father     Other - See Comments Brother         plane crash    Other - See Comments Brother         plane crash    Cancer Maternal Grandmother     Emphysema Maternal Grandfather      Unknown/Adopted Paternal Grandmother     Unknown/Adopted Paternal Grandfather          Current Outpatient Medications   Medication Sig Dispense Refill    aspirin 81 MG EC tablet Take 81 mg by mouth daily      atorvastatin (LIPITOR) 10 MG tablet Take 1 tablet (10 mg) by mouth daily 90 tablet 3    blood glucose (CONTOUR NEXT TEST) test strip USE TO TEST BLOOD SUGAR TWICE DAILY 100 strip 3    diclofenac (VOLTAREN) 1 % topical gel Apply 4 g topically 4 times daily 100 g 0    lactobacillus rhamnosus (GG) (CULTURELL) capsule Take 1 capsule by mouth 3 times daily      lisinopril (ZESTRIL) 2.5 MG tablet TAKE 1 TABLET BY MOUTH DAILY 30 tablet 7    metFORMIN (GLUCOPHAGE XR) 500 MG 24 hr tablet TAKE 1 TABLET BY MOUTH 2 TIMES DAILY (WITH MEALS) 60 tablet 11    Microlet Lancets MISC USE TO TEST 2 TIMES DAILY 100 each 3    tiZANidine (ZANAFLEX) 2 MG tablet TAKE 1 TABLET BY MOUTH TWICE DAILY AS NEEDED FOR MUSCLE SPASMS 40 tablet 1    UNABLE TO FIND MEDICATION NAME: Multivitamin plant base liquid, 2 Tbsp daily      Vitamin D, Cholecalciferol, 10 MCG (400 UNIT) TABS Take 800 Units by mouth daily      predniSONE (DELTASONE) 20 MG tablet Take two tablets (= 40mg) each day for 5 (five) days 10 tablet 0     No Known Allergies  Recent Labs   Lab Test 02/16/23  0953 02/11/23  2325 11/16/22  1104 07/27/22  1328 07/25/22  1120 11/19/21  0527 11/18/21  0650 11/15/21  2357 04/03/20  1922 01/17/20  0000 11/18/19  0000 11/07/19  1717   A1C 6.3*  --  6.8*  --  6.7*  --   --    < >  --    < >  --  13.3*   LDL  --   --  48  --   --   --   --   --   --   --  70  --    HDL  --   --  39*  --   --   --   --   --   --   --  29*  --    TRIG  --   --  89  --   --   --   --   --   --   --  224*  --    ALT  --  46 45  --   --   --  41   < > 40  --   --   --    CR  --  0.86 0.82   < >  --    < > 0.73   < > 0.76  --   --  0.70   GFRESTIMATED  --  >90 >90   < >  --    < > >90   < > >90  --   --  >90   GFRESTBLACK  --   --   --   --   --   --   --   --  >90   --   --  >90   POTASSIUM  --  4.2 3.8  --   --    < > 4.5   < > 3.9  --   --  4.0   TSH  --   --  3.32  --   --   --   --   --  1.19  --   --   --     < > = values in this interval not displayed.        Reviewed and updated as needed this visit by clinical staff   Tobacco  Allergies  Meds              Reviewed and updated as needed this visit by Provider                 Past Medical History:   Diagnosis Date    Concussion without loss of consciousness 1996    on ski lift -- had HAs, dizziness, memory troubles, no driving for 6 mos    Diabetes mellitus, type 2 (H) 2019    Hyperlipidemia LDL goal <100     Obesity     JAGUAR on CPAP     Pneumonia due to 2019 novel coronavirus 11/2021    was hospitalized for a week    Shingles 2019      Past Surgical History:   Procedure Laterality Date    COLONOSCOPY N/A 05/20/2016    due 2026  Procedure: COLONOSCOPY;  Surgeon: Mayo Felix DO;  Location: [facility]     OB History   No obstetric history on file.       Review of Systems   Constitutional:  Negative for chills and fever.   HENT:  Negative for congestion, ear pain, hearing loss and sore throat.    Eyes:  Negative for pain and visual disturbance.   Respiratory:  Negative for cough and shortness of breath.    Cardiovascular:  Positive for chest pain. Negative for palpitations and peripheral edema.   Gastrointestinal:  Negative for abdominal pain, constipation, diarrhea, heartburn, hematochezia and nausea.   Genitourinary:  Positive for impotence. Negative for dysuria, frequency, genital sores, hematuria, penile discharge and urgency.   Musculoskeletal:  Positive for arthralgias. Negative for joint swelling and myalgias.   Skin:  Negative for rash.   Neurological:  Negative for dizziness, weakness, headaches and paresthesias.   Psychiatric/Behavioral:  Negative for mood changes. The patient is not nervous/anxious.        OBJECTIVE:   /70 (BP Location: Left arm, Patient Position: Sitting, Cuff Size: Adult Regular)    "Pulse 80   Temp 97.9  F (36.6  C) (Tympanic)   Resp 19   Ht 1.753 m (5' 9\")   Wt 94.1 kg (207 lb 6.4 oz)   SpO2 96%   BMI 30.63 kg/m      Physical Exam  GENERAL: healthy, alert and no distress  EYES: Eyes grossly normal to inspection, PERRL and conjunctivae and sclerae normal  HENT: ear canals and TM's normal, nose and mouth without ulcers or lesions  NECK: no adenopathy, no asymmetry, masses, or scars and thyroid normal to palpation  RESP: lungs clear to auscultation - no rales, rhonchi or wheezes  CV: regular rate and rhythm, normal S1 S2, no S3 or S4, no murmur, click or rub, no peripheral edema and peripheral pulses strong  ABDOMEN: soft, nontender, no hepatosplenomegaly, no masses and bowel sounds normal  MS: no gross musculoskeletal defects noted, no edema  SKIN: no suspicious lesions or rashes  NEURO: Normal strength and tone, mentation intact and speech normal  PSYCH: mentation appears normal, affect normal/bright    Diagnostic Test Results:  Labs reviewed in Epic  Results for orders placed or performed in visit on 10/04/23   Extra Tube     Status: None (In process)    Narrative    The following orders were created for panel order Extra Tube.  Procedure                               Abnormality         Status                     ---------                               -----------         ------                     Extra Green Top (Lithium...[[id number]]                      In process                 Extra Purple Top Tube[[id number]]                            In process                   Please view results for these tests on the individual orders.       ASSESSMENT/PLAN:   (Z00.00) Annual physical exam  (primary encounter diagnosis)  Comment:   Plan: follow-up 1 year    (E11.65) Type 2 diabetes mellitus with hyperglycemia, without long-term current use of insulin (H)  Comment:   Plan: Comprehensive metabolic panel, Hemoglobin A1c        Follow-up 4 mos    (E78.2) Mixed hyperlipidemia  Comment:   Plan: " "Lipid Profile (Chol, Trig, HDL, LDL calc)        The ASCVD Risk score (Therese TOLBERT, et al., 2019) failed to calculate for the following reasons:    The valid total cholesterol range is 130 to 320 mg/dL    (E55.9) Hypovitaminosis D  Comment:   Plan: due    (Z12.5) Screening for prostate cancer  Comment:   Plan: PSA, screen        due    COUNSELING:   Reviewed preventive health counseling, as reflected in patient instructions  Special attention given to:        Regular exercise       Healthy diet/nutrition       Vision screening       Hearing screening       Prostate cancer screening      BMI:   Estimated body mass index is 30.63 kg/m  as calculated from the following:    Height as of this encounter: 1.753 m (5' 9\").    Weight as of this encounter: 94.1 kg (207 lb 6.4 oz).   Weight management plan: Discussed healthy diet and exercise guidelines      He reports that he quit smoking about 29 years ago. His smoking use included cigarettes. He started smoking about 44 years ago. He has a 15.00 pack-year smoking history. He has never been exposed to tobacco smoke. He has never used smokeless tobacco.            Mimi Bhatt MD  [facility] - [place]BINGAnswers submitted by the patient for this visit:  Patient Health Questionnaire (Submitted on 10/4/2023)  If you checked off any problems, how difficult have these problems made it for you to do your work, take care of things at home, or get along with other people?: Not difficult at all  PHQ9 TOTAL SCORE: 2  AMANDA-7 (Submitted on 10/4/2023)  AMANDA 7 TOTAL SCORE: 0    "

## 2023-10-04 NOTE — COMMUNITY RESOURCES LIST (ENGLISH)
10/04/2023   [facility] - Outpatient Clinics  N/A  For additional resource needs, please contact your health insurance member services or your primary care team.  Phone: [phone]   Email: N/A   Address: [address] [place] [address]   Hours: N/A        Exercise and Recreation       Gym or workout facility  1  [employer] [facility] Distance: 21.52 miles      In-Person   [address] [place] [address]  Language: English  Hours: Mon - Sun Open 24 Hours  Fees: Insurance, Self Pay, Sliding Fee   Phone: [phone] Email: [email or web address] Website: [email or web address]          Important Numbers & Websites       [address]org  Poison Control   (178) 203-8540 [place]po[place]  Suicide and Crisis Lifeline   [address] Centerline.org  Childhelp [place] Child Abuse Hotline   994.118.4033 Childhelphotline.org  National Sexual Assault Hotline   (665) 241-9348 (HOPE) Rainn.org  National Runaway Safeline   (218) 151-2659 (RUNAWAY) [facility]  Pregnancy & Postpartum Support [place]   Call/text 701-593-9335 Ppsupport[place].org  Substance Abuse National Helpline ([facility]   940-716-HELP (1251) Findtreatment.gov  Emergency Services   911

## 2023-12-11 DIAGNOSIS — E78.2 MIXED HYPERLIPIDEMIA: ICD-10-CM

## 2023-12-11 NOTE — TELEPHONE ENCOUNTER
LIPITOR      Last Written Prescription Date:  11-16-22  Last Fill Quantity: 90,   # refills: 3  Last Office Visit: 10-4-23  Future Office visit:    Next 5 appointments (look out 90 days)      Feb 28, 2024 11:30 AM  (Arrive by 11:15 AM)  SHORT with Mimi Bhatt MD  [facility] ([facility] [place] ) [address]  [place] [address]  [phone]             Routing refill request to provider for review/approval because:  Drug not on the [facility], [facility] or [facility] refill protocol or controlled substance

## 2023-12-13 DIAGNOSIS — R73.03 PREDIABETES: ICD-10-CM

## 2023-12-13 RX ORDER — ATORVASTATIN CALCIUM 10 MG/1
10 TABLET, FILM COATED ORAL DAILY
Qty: 90 TABLET | Refills: 2 | Status: SHIPPED | OUTPATIENT
Start: 2023-12-13

## 2023-12-13 NOTE — TELEPHONE ENCOUNTER
Lancets      Last Written Prescription Date:  05/04/23  Last Fill Quantity: 100,   # refills: 3  Last Office Visit: 10/04/23  Future Office visit:    Next 5 appointments (look out 90 days)      Feb 28, 2024 11:30 AM  (Arrive by 11:15 AM)  SHORT with Mimi Bhatt MD  [facility] [place] ([facility] [place] ) [address]  [place] [address]  [phone]

## 2023-12-15 RX ORDER — LANCETS
EACH MISCELLANEOUS
Qty: 100 EACH | Refills: 3 | Status: SHIPPED | OUTPATIENT
Start: 2023-12-15 | End: 2024-02-06

## 2024-02-02 ENCOUNTER — MYC MEDICAL ADVICE (OUTPATIENT)
Dept: PULMONOLOGY | Facility: OTHER | Age: 64
End: 2024-02-02

## 2024-02-02 ENCOUNTER — MYC MEDICAL ADVICE (OUTPATIENT)
Dept: FAMILY MEDICINE | Facility: OTHER | Age: 64
End: 2024-02-02

## 2024-02-02 DIAGNOSIS — R73.03 PREDIABETES: ICD-10-CM

## 2024-02-02 DIAGNOSIS — G47.10 HYPERSOMNIA: ICD-10-CM

## 2024-02-02 DIAGNOSIS — G47.33 OSA (OBSTRUCTIVE SLEEP APNEA): Primary | ICD-10-CM

## 2024-02-05 ENCOUNTER — MYC MEDICAL ADVICE (OUTPATIENT)
Dept: FAMILY MEDICINE | Facility: OTHER | Age: 64
End: 2024-02-05

## 2024-02-05 ENCOUNTER — TELEPHONE (OUTPATIENT)
Dept: FAMILY MEDICINE | Facility: OTHER | Age: 64
End: 2024-02-05

## 2024-02-05 DIAGNOSIS — R73.03 PREDIABETES: ICD-10-CM

## 2024-02-05 RX ORDER — LISINOPRIL 2.5 MG/1
2.5 TABLET ORAL DAILY
Qty: 30 TABLET | Refills: 3 | Status: SHIPPED | OUTPATIENT
Start: 2024-02-05 | End: 2024-05-28

## 2024-02-05 NOTE — TELEPHONE ENCOUNTER
Reason for call:  Medication      Have you contacted your pharmacy? Yes   If patient has contacted Pharmacy and it has been over 72hrs, continue to #2  Medication metformin 500 mg, lisinopril   What Pharmacy do you use? Patient switched pharmacies from [facility] to [facility] needs these medications sent there.      (Please note that the turn-around-time for prescriptions is 72 business hours; I am sending your request at this time. SEND TO  [facility]  )

## 2024-02-05 NOTE — TELEPHONE ENCOUNTER
blood glucose (CONTOUR NEXT TEST) test strip    Last Written Prescription Date:  9-5-23  Last Fill Quantity: 100,   # refills: 3  Last Office Visit: 10-4-23  Future Office visit:    Next 5 appointments (look out 90 days)      Feb 28, 2024 11:30 AM  (Arrive by 11:15 AM)  SHORT with Mimi Bhatt MD  [facility] ([facility] ) [address]  [place] [address]  [phone]             Routing refill request to provider for review/approval because:  Drug not on the [facility], [facility] or  Health refill protocol or controlled substance

## 2024-02-05 NOTE — TELEPHONE ENCOUNTER
Lisinopril      Last Written Prescription Date:  6/16/23  Last Fill Quantity: 30,   # refills: 7  Last Office Visit: 10/4/23  Future Office visit:    Next 5 appointments (look out 90 days)      Feb 28, 2024 11:30 AM  (Arrive by 11:15 AM)  SHORT with Mimi Bhatt MD  [facility] [place] ([facility] [place] ) [address]  [place] [address]  [phone]               \

## 2024-02-06 DIAGNOSIS — R73.03 PREDIABETES: ICD-10-CM

## 2024-02-06 RX ORDER — METFORMIN HCL 500 MG
500 TABLET, EXTENDED RELEASE 24 HR ORAL 2 TIMES DAILY WITH MEALS
Qty: 60 TABLET | Refills: 1 | Status: SHIPPED | OUTPATIENT
Start: 2024-02-06

## 2024-02-06 NOTE — TELEPHONE ENCOUNTER
Pt want metformin sent to [facility] as they except his insurance.

## 2024-02-06 NOTE — TELEPHONE ENCOUNTER
Lancets       Last Written Prescription Date:  12/15/2023  Last Fill Quantity: 100,   # refills: 3  Last Office Visit: 10/04/2023  Future Office visit:    Next 5 appointments (look out 90 days)      Feb 28, 2024 11:30 AM  (Arrive by 11:15 AM)  SHORT with Mimi Bhatt MD  [facility] [place] ([facility] [place] ) [address]  [place] [address]  [phone]

## 2024-02-07 RX ORDER — LANCETS
EACH MISCELLANEOUS
Qty: 100 EACH | Refills: 3 | Status: SHIPPED | OUTPATIENT
Start: 2024-02-07 | End: 2024-02-08

## 2024-02-08 RX ORDER — LANCETS
EACH MISCELLANEOUS
Qty: 100 EACH | Refills: 3 | Status: SHIPPED | OUTPATIENT
Start: 2024-02-08

## 2024-02-08 NOTE — TELEPHONE ENCOUNTER
Medicare only allows once daily testing for non-insulin dependent patients.  New RXs pended.  Please review and sign if appropriate.

## 2024-02-16 ENCOUNTER — TELEPHONE (OUTPATIENT)
Dept: FAMILY MEDICINE | Facility: OTHER | Age: 64
End: 2024-02-16

## 2024-02-16 DIAGNOSIS — R73.03 PREDIABETES: Primary | ICD-10-CM

## 2024-02-24 ENCOUNTER — HEALTH MAINTENANCE LETTER (OUTPATIENT)
Age: 64
End: 2024-02-24

## 2024-02-28 ENCOUNTER — LAB (OUTPATIENT)
Dept: LAB | Facility: OTHER | Age: 64
End: 2024-02-28
Attending: NURSE PRACTITIONER
Payer: MEDICARE

## 2024-02-28 ENCOUNTER — HOSPITAL ENCOUNTER (OUTPATIENT)
Dept: EDUCATION SERVICES | Facility: HOSPITAL | Age: 64
Discharge: HOME | End: 2024-02-28
Attending: NURSE PRACTITIONER
Payer: MEDICARE

## 2024-02-28 VITALS
BODY MASS INDEX: 29.85 KG/M2 | HEART RATE: 93 BPM | RESPIRATION RATE: 16 BRPM | SYSTOLIC BLOOD PRESSURE: 107 MMHG | DIASTOLIC BLOOD PRESSURE: 66 MMHG | HEIGHT: 70 IN | WEIGHT: 208.5 LBS | OXYGEN SATURATION: 95 %

## 2024-02-28 DIAGNOSIS — R73.03 PREDIABETES: Primary | ICD-10-CM

## 2024-02-28 LAB
ALBUMIN SERPL BCG-MCNC: 4.7 G/DL (ref 3.5–5.2)
ALP SERPL-CCNC: 64 U/L (ref 40–150)
ALT SERPL W P-5'-P-CCNC: 24 U/L (ref 0–70)
ANION GAP SERPL CALCULATED.3IONS-SCNC: 12 MMOL/L (ref 7–15)
AST SERPL W P-5'-P-CCNC: 17 U/L (ref 0–45)
BILIRUB SERPL-MCNC: 0.4 MG/DL
BUN SERPL-MCNC: 17.9 MG/DL (ref 8–23)
CALCIUM SERPL-MCNC: 9.5 MG/DL (ref 8.8–10.2)
CHLORIDE SERPL-SCNC: 102 MMOL/L (ref 98–107)
CHOLEST SERPL-MCNC: 86 MG/DL
CREAT SERPL-MCNC: 0.85 MG/DL (ref 0.67–1.17)
DEPRECATED HCO3 PLAS-SCNC: 23 MMOL/L (ref 22–29)
EGFRCR SERPLBLD CKD-EPI 2021: >90 ML/MIN/1.73M2
EST. AVERAGE GLUCOSE BLD GHB EST-MCNC: 154 MG/DL
FASTING STATUS PATIENT QL REPORTED: YES
GLUCOSE SERPL-MCNC: 151 MG/DL (ref 70–99)
HBA1C MFR BLD: 7 %
HDLC SERPL-MCNC: 30 MG/DL
LDLC SERPL CALC-MCNC: 35 MG/DL
NONHDLC SERPL-MCNC: 56 MG/DL
POTASSIUM SERPL-SCNC: 4.3 MMOL/L (ref 3.4–5.3)
PROT SERPL-MCNC: 7.3 G/DL (ref 6.4–8.3)
SODIUM SERPL-SCNC: 137 MMOL/L (ref 135–145)
TRIGL SERPL-MCNC: 104 MG/DL

## 2024-02-28 PROCEDURE — 80053 COMPREHEN METABOLIC PANEL: CPT | Mod: ZL

## 2024-02-28 PROCEDURE — G0108 DIAB MANAGE TRN  PER INDIV: HCPCS

## 2024-02-28 PROCEDURE — 83036 HEMOGLOBIN GLYCOSYLATED A1C: CPT | Mod: ZL

## 2024-02-28 PROCEDURE — 80061 LIPID PANEL: CPT | Mod: ZL

## 2024-02-28 PROCEDURE — 36415 COLL VENOUS BLD VENIPUNCTURE: CPT | Mod: ZL

## 2024-02-28 NOTE — PROGRESS NOTES
Diabetes Self-Management Education & Support    Presents for: Individual review    Type of Service: In Person Visit    ASSESSMENT:  Ward, 63 year old, returns to [facility] for follow up/annual. Labs per PCP prior to visit completed, pending. Review upon resulted.     Pt will have a new policy with [employer]. Discussed with patient, [facility] does not accept [employer] coverage. Options: need to follow up with his  and change ASAP today/tomorrow. After that, he will no longer be covered at this facility.      Neuropathy sx/change: noticing a little more tingling in feet. Sometimes pain into calf. Has PCP visit scheduled March.      A1C:  7.0 today. Target A1C: <7.5 Previous A1C: 6.9   Meter Report: 2 week average 146. 113-181. Testing twice daily.   FBG range: 123-176  PP: 113-181.      Discussed Medicare will cover 1 test strip per day. Pt plans to buy additional OOP.      Metformin  mg one tab twice daily.  Will wait for A1C result, follow up call once available to review with patient.   May benefit from increasing Metformin to 1 tab in am, 2 tabs with evening meal.     No significant changes in eating habits. Usually eats 2 meals/day. 1-2 handfuls of peanut as snack.   Limited exercise. Encouraged to increase activity. Has treadmill considering for use. Weather affects activity. Spring is coming, looking forward for more activity.     BP meeting healthy target. Statin use. ASA use.   Tobacco use- former.  Foot exam due, denies new concerns.    Eye exam- due March.  [facility].    PCP follow up- scheduled 3/13/2024.       PCP: Dr Bhatt   Insurance Coverage: Medicare/Medicaid. Changing to [employer] 3/1/2024- advised will need to change asap or will need to est care with another provider.        Labs: CMP, Lipids within the last year.   Microalbumin recommended.    Noninvasive Liver Fibrosis Assessment:  defer to pcp for further recommendations.   Computed FIB-4 Calculation unavailable. One  or more values for this score either were not found within the given timeframe or did not fit some other criterion.      No Known Allergies     Wt Readings from Last 10 Encounters:   02/28/24 94.6 kg (208 lb 8 oz)   10/04/23 94.1 kg (207 lb 6.4 oz)   03/27/23 93 kg (205 lb)   02/28/23 94.3 kg (208 lb)   02/16/23 93.4 kg (206 lb)   11/16/22 90.7 kg (200 lb)   08/29/22 91.6 kg (202 lb)   08/02/22 93.4 kg (206 lb)   07/27/22 93.4 kg (206 lb)   07/25/22 93.4 kg (206 lb)     Patient's most recent   Lab Results   Component Value Date    A1C 7.0 02/28/2024    A1C 6.7 07/25/2022     is meeting goal of  <7.5    Diabetes knowledge and skills assessment:   Patient is knowledgeable in diabetes management concepts related to: Healthy Eating, Monitoring, and Taking Medication  Continue education and support with diabetes management concepts as indicated.  Based on learning assessment above, most appropriate setting for further diabetes education would be: Individual setting.      PLAN  Continue current course Metformin  mg one tab twice daily.  Continue BG checks- aware Medicare will cover 1 strip/day. Plans to buy additional OOP.   Work on activity.   Insurance policy- will look into switching today to other option (not [employer]).   See PCP as scheduled in March.   Suggest Microalbumin with PCP visit   Annual visit or sooner if desired.     See Care Plan for co-developed, patient-state behavior change goals.  AVS provided for patient today.    Education Materials Provided:  ADA Managing Diabetes Day to Day handout.     SUBJECTIVE/OBJECTIVE:  Presents for: Individual review  Accompanied by: Self  Diabetes education in the past 24mo: Yes  Focus of Visit: Diabetes Pathophysiology, Monitoring, Taking Medication  Diabetes type: Type 2  Disease course: Stable  How confident are you filling out medical forms by yourself:: Quite a bit  Diabetes management related comments/concerns: none today. annual visit.  Transportation concerns:  "No  Difficulty affording diabetes medication?: No  Difficulty affording diabetes testing supplies?: No  Other concerns:: Other (insurance issue.)  Cultural Influences/Ethnic Background:  Not  or     Diabetes Symptoms & Complications:  Diabetes Related Symptoms: Neuropathy (feet. sometimes feels in calves.)  Weight trend: Stable  Symptom course: Stable  Disease course: Stable  Complications assessed today?: Yes  Autonomic neuropathy: No  CVA: No  Heart disease: No  Nephropathy: No  Peripheral neuropathy: No  Peripheral Vascular Disease: No  Retinopathy: No    Patient Problem List and Family Medical History reviewed for relevant medical history, current medical status, and diabetes risk factors.    Vitals:  /66   Pulse 93   Resp 16   Ht 1.778 m (5' 10\")   Wt 94.6 kg (208 lb 8 oz)   SpO2 95%   BMI 29.92 kg/m    Estimated body mass index is 29.92 kg/m  as calculated from the following:    Height as of this encounter: 1.778 m (5' 10\").    Weight as of this encounter: 94.6 kg (208 lb 8 oz).   Last 3 BP:   BP Readings from Last 3 Encounters:   02/28/24 107/66   10/04/23 110/70   07/25/23 121/70       History   Smoking Status    Former    Packs/day: 1.00    Years: 15.00    Types: Cigarettes    Start date: 4/20/1979    Quit date: 7/1/1994   Smokeless Tobacco    Never       Labs:  Lab Results   Component Value Date    A1C 7.0 02/28/2024    A1C 6.7 07/25/2022     Lab Results   Component Value Date     02/28/2024     11/18/2021     04/03/2020     Lab Results   Component Value Date    LDL 35 02/28/2024    LDL 70 11/18/2019     HDL Cholesterol   Date Value Ref Range Status   11/18/2019 29 (L) 40 - 60 mg/dL Final     Direct Measure HDL   Date Value Ref Range Status   02/28/2024 30 (L) >=40 mg/dL Final   ]  GFR Estimate   Date Value Ref Range Status   02/28/2024 >90 >60 mL/min/1.73m2 Final   04/03/2020 >90 >60 mL/min/[1.73_m2] Final     Comment:     Non  GFR " "Calc  Starting 12/18/2018, serum creatinine based estimated GFR (eGFR) will be   calculated using the Chronic Kidney Disease Epidemiology Collaboration   (CKD-EPI) equation.       GFR, ESTIMATED POCT   Date Value Ref Range Status   07/27/2022 >60 >60 mL/min/1.73m2 Final     GFR Estimate If Black   Date Value Ref Range Status   04/03/2020 >90 >60 mL/min/[1.73_m2] Final     Comment:      GFR Calc  Starting 12/18/2018, serum creatinine based estimated GFR (eGFR) will be   calculated using the Chronic Kidney Disease Epidemiology Collaboration   (CKD-EPI) equation.       Lab Results   Component Value Date    CR 0.85 02/28/2024    CR 0.76 04/03/2020     No results found for: \"MICROALBUMIN\"    Healthy Eating:  Healthy Eating Assessed Today: Yes  Cultural/Anabaptism diet restrictions?: No  Do you have any food allergies or intolerances?: No  Meal planning/habits: Carb counting, Keeps food records  Who cooks/prepares meals for you?: Self  Who purchases food in  your home?: Self  How many times a week on average do you eat food made away from home (restaurant/take-out)?: 0  Meals include: Breakfast, Dinner  Breakfast: around 1030-11 am. lina salad with chicken.  Dinner: 630-7: hamburger with lettuce and tomato. or eggs. sometimes veg.  Snacks: peanuts. 1-2 handfuls.  Beverages: Water, Coffee    Being Active:  Being Active Assessed Today: Yes  Exercise:: Currently not exercising (dances with niece in afternoons sometimes.)  Barrier to exercise: Other (motivation/weather.)    Monitoring:  Monitoring Assessed Today: Yes  Did patient bring glucose meter to appointment? : Yes  Blood Glucose Meter: ContourNext  Times checking blood sugar at home (number): 2  Times checking blood sugar at home (per): Day  Blood glucose trend: No change (stable.)    Taking Medications:  Diabetes Medication(s)       Biguanides       metFORMIN (GLUCOPHAGE XR) 500 MG 24 hr tablet Take 1 tablet (500 mg) by mouth 2 times daily (with " meals)            Taking Medication Assessed Today: Yes  Current Treatments: Diet, Oral Medication (taken by mouth) (Metformin  mg one tab twice daily.)  Problems taking diabetes medications regularly?: No  Diabetes medication side effects?: No    Problem Solving:  Problem Solving Assessed Today: Yes  Is the patient at risk for hypoglycemia?: No  Patient carries a carbohydrate source: No  Is the patient at risk for DKA?: No    Hypoglycemia Symptoms  Hypoglycemia: None    Hypoglycemia Complications  Hypoglycemia Complications: None    Reducing Risks:  Reducing Risks Assessed Today: Yes  Diabetes Risks: Age over 45 years, Sedentary Lifestyle  CAD Risks: Diabetes Mellitus, Male sex, Obesity, Sedentary lifestyle  Has dilated eye exam at least once a year?: Yes (due in march)  Sees dentist every 6 months?: Yes  Feet checked by healthcare provider in the last year?: No    Healthy Coping:  Healthy Coping Assessed Today: Yes  Emotional response to diabetes: Ready to learn, Confidence diabetes can be controlled, Concern for health and well-being  Stage of change: MAINTENANCE (Working to maintain change, with risk of relapse)  Patient Activation Measure Survey Score:       No data to display              Time Spent: 30 minutes  Encounter Type: Individual    Any diabetes medication dose changes were made via the CDE Protocol per the patient's primary care provider. A copy of this encounter was shared with the provider.    Karissa Akins RN Diabetes Educator,  [phone]  2/28/2024 at 10:17 AM

## 2024-02-28 NOTE — PATIENT INSTRUCTIONS
Continue current plan- Metformin  mg one tab twice daily.   Medicare will cover 1 test strip per day, okay to buy additional out of pocket.   Brand of meter Medicare covers varies by policy.     Encourage activity: increase as able. Adult target 150 minutes /week.   Foot exam due with PCP.   Eye exam due in March.     Return- annual visit. Scheduled for 2/28/2025 at 9 am.     Please call or send a mychart if you have any questions/concerns!  [phone].    Thank you for coming in today!   Karissa Akins, LUCITA  Diabetes Education

## 2024-03-22 ENCOUNTER — TRANSFERRED RECORDS (OUTPATIENT)
Dept: HEALTH INFORMATION MANAGEMENT | Facility: CLINIC | Age: 64
End: 2024-03-22

## 2024-03-22 LAB — RETINOPATHY: NEGATIVE

## 2024-04-12 ENCOUNTER — OFFICE VISIT (OUTPATIENT)
Dept: FAMILY MEDICINE | Facility: OTHER | Age: 64
End: 2024-04-12
Attending: FAMILY MEDICINE
Payer: COMMERCIAL

## 2024-04-12 ENCOUNTER — LAB (OUTPATIENT)
Dept: LAB | Facility: OTHER | Age: 64
End: 2024-04-12
Payer: COMMERCIAL

## 2024-04-12 VITALS
DIASTOLIC BLOOD PRESSURE: 67 MMHG | RESPIRATION RATE: 16 BRPM | WEIGHT: 193.56 LBS | HEIGHT: 70 IN | BODY MASS INDEX: 27.71 KG/M2 | OXYGEN SATURATION: 97 % | HEART RATE: 95 BPM | SYSTOLIC BLOOD PRESSURE: 107 MMHG | TEMPERATURE: 97.8 F

## 2024-04-12 DIAGNOSIS — Z13.6 SCREENING FOR AAA (ABDOMINAL AORTIC ANEURYSM): ICD-10-CM

## 2024-04-12 DIAGNOSIS — G47.33 OSA ON CPAP: ICD-10-CM

## 2024-04-12 DIAGNOSIS — Z87.891 HISTORY OF SMOKING: ICD-10-CM

## 2024-04-12 DIAGNOSIS — R73.03 PREDIABETES: Primary | ICD-10-CM

## 2024-04-12 DIAGNOSIS — R73.03 PREDIABETES: ICD-10-CM

## 2024-04-12 DIAGNOSIS — K21.9 GASTROESOPHAGEAL REFLUX DISEASE WITHOUT ESOPHAGITIS: ICD-10-CM

## 2024-04-12 DIAGNOSIS — E78.2 MIXED HYPERLIPIDEMIA: ICD-10-CM

## 2024-04-12 DIAGNOSIS — E55.9 HYPOVITAMINOSIS D: ICD-10-CM

## 2024-04-12 PROBLEM — E78.5 HYPERLIPIDEMIA ASSOCIATED WITH TYPE 2 DIABETES MELLITUS (H): Status: RESOLVED | Noted: 2024-04-12 | Resolved: 2024-04-12

## 2024-04-12 PROBLEM — E78.5 HYPERLIPIDEMIA ASSOCIATED WITH TYPE 2 DIABETES MELLITUS (H): Status: ACTIVE | Noted: 2024-04-12

## 2024-04-12 PROBLEM — E11.69 HYPERLIPIDEMIA ASSOCIATED WITH TYPE 2 DIABETES MELLITUS (H): Status: RESOLVED | Noted: 2024-04-12 | Resolved: 2024-04-12

## 2024-04-12 PROBLEM — J96.11 CHRONIC RESPIRATORY FAILURE WITH HYPOXIA (H): Status: RESOLVED | Noted: 2021-11-16 | Resolved: 2024-04-12

## 2024-04-12 PROBLEM — E11.69 HYPERLIPIDEMIA ASSOCIATED WITH TYPE 2 DIABETES MELLITUS (H): Status: ACTIVE | Noted: 2024-04-12

## 2024-04-12 LAB
CREAT UR-MCNC: 43.4 MG/DL
EST. AVERAGE GLUCOSE BLD GHB EST-MCNC: 131 MG/DL
HBA1C MFR BLD: 6.2 %
MICROALBUMIN UR-MCNC: <12 MG/L
MICROALBUMIN/CREAT UR: NORMAL MG/G{CREAT}
TSH SERPL DL<=0.005 MIU/L-ACNC: 2.69 UIU/ML (ref 0.3–4.2)

## 2024-04-12 PROCEDURE — 82570 ASSAY OF URINE CREATININE: CPT | Mod: ZL

## 2024-04-12 PROCEDURE — 83036 HEMOGLOBIN GLYCOSYLATED A1C: CPT | Mod: ZL

## 2024-04-12 PROCEDURE — G0463 HOSPITAL OUTPT CLINIC VISIT: HCPCS

## 2024-04-12 PROCEDURE — 99207 PR FOOT EXAM NO CHARGE: CPT | Performed by: FAMILY MEDICINE

## 2024-04-12 PROCEDURE — 99214 OFFICE O/P EST MOD 30 MIN: CPT | Performed by: FAMILY MEDICINE

## 2024-04-12 PROCEDURE — 36415 COLL VENOUS BLD VENIPUNCTURE: CPT | Mod: ZL

## 2024-04-12 PROCEDURE — 84443 ASSAY THYROID STIM HORMONE: CPT | Mod: ZL

## 2024-04-12 ASSESSMENT — PAIN SCALES - GENERAL: PAINLEVEL: NO PAIN (0)

## 2024-04-12 NOTE — PROGRESS NOTES
"  Assessment & Plan     Type 2 diabetes mellitus with hyperglycemia, without long-term current use of insulin (H)  Follow-up 3 mos  - Hemoglobin A1c; Future  - Albumin Random Urine Quantitative with Creat Ratio; Future  - IA FOOT EXAM NO CHARGE  - TSH with free T4 reflex; Future    Mixed hyperlipidemia  The ASCVD Risk score (Therese TOLBERT, et al., 2019) failed to calculate for the following reasons:    The valid total cholesterol range is 130 to 320 mg/dL  Continue statin    Gastroesophageal reflux disease without esophagitis  stable    Hypovitaminosis D  stable    JAGUAR on CPAP  Still using    Screening for AAA (abdominal aortic aneurysm)  due  - Abdominal Aortic Aneurysm Screening/Tracking  - US Abdominal Aorta Imaging; Future    History of smoking  due  - Abdominal Aortic Aneurysm Screening/Tracking  - US Abdominal Aorta Imaging; Future      BMI  Estimated body mass index is 27.77 kg/m  as calculated from the following:    Height as of this encounter: 1.778 m (5' 10\").    Weight as of this encounter: 87.8 kg (193 lb 9 oz).     Blood sugar testing frequency justification:  Adjustment of medication(s)    Patient was agreeable to this plan and had no further questions.  There are no Patient Instructions on file for this visit.    No follow-ups on file.    Chandni Hopper is a 63 year old, presenting for the following health issues:  Diabetes, Lipids, and Gastrophageal Reflux        4/12/2024     2:57 PM   Additional Questions   Roomed by Mica Guido   Accompanied by Wife         4/12/2024     2:57 PM   Patient Reported Additional Medications   Patient reports taking the following new medications None     History of Present Illness       Diabetes:   He presents for follow up of diabetes.  He is checking home blood glucose two times daily.   He checks blood glucose before meals and at bedtime.  Blood glucose is never over 200 and never under 70.     He has no concerns regarding his diabetes at this time.  He is " "having numbness in feet.            He eats 2-3 servings of fruits and vegetables daily.He consumes 0 sweetened beverage(s) daily.He exercises with enough effort to increase his heart rate 10 to 19 minutes per day.  He exercises with enough effort to increase his heart rate 5 days per week.   He is taking medications regularly.   Exercising 7 days a week 10-20 min  Or dancing with his niece  His blood sugars are getting lower 'for him'  but not actual low blood sugars    Diabetes Follow-up    How often are you checking your blood sugar? Two times daily  Blood sugar testing frequency justification:   Unknown  What time of day are you checking your blood sugars (select all that apply)?  Before meals and At bedtime  Have you had any blood sugars above 200?  No  Have you had any blood sugars below 70?  No  What symptoms do you notice when your blood sugar is low?  None  What concerns do you have today about your diabetes? None   Do you have any of these symptoms? (Select all that apply)  Numbness in feet      BP Readings from Last 2 Encounters:   04/12/24 107/67   02/28/24 107/66     Hemoglobin A1C (%)   Date Value   02/28/2024 7.0 (H)   10/04/2023 6.9 (H)   07/25/2022 6.7 (A)   01/17/2020 7.8 (A)     LDL Cholesterol Calculated (mg/dL)   Date Value   02/28/2024 35   10/04/2023 46   11/18/2019 70         Hyperlipidemia Follow-Up    Are you regularly taking any medication or supplement to lower your cholesterol?   Yes- atorvastatin  Are you having muscle aches or other side effects that you think could be caused by your cholesterol lowering medication?  No      Review of Systems  Constitutional, HEENT, cardiovascular, pulmonary, GI, , musculoskeletal, neuro, skin, endocrine and psych systems are negative, except as otherwise noted.      Objective    /67 (BP Location: Right arm, Patient Position: Sitting, Cuff Size: Adult Large)   Pulse 95   Temp 97.8  F (36.6  C) (Tympanic)   Resp 16   Ht 1.778 m (5' 10\")   " Wt 87.8 kg (193 lb 9 oz)   SpO2 97%   BMI 27.77 kg/m    Body mass index is 27.77 kg/m .  Physical Exam   GENERAL: alert and no distress  NECK: no adenopathy, no asymmetry, masses, or scars  RESP: lungs clear to auscultation - no rales, rhonchi or wheezes  CV: regular rate and rhythm, normal S1 S2, no S3 or S4, no murmur, click or rub, no peripheral edema  ABDOMEN: soft, nontender, no hepatosplenomegaly, no masses and bowel sounds normal  MS: no gross musculoskeletal defects noted, no edema  PSYCH: mentation appears normal, affect normal/bright    Results for orders placed or performed in visit on 04/12/24   Albumin Random Urine Quantitative with Creat Ratio     Status: None   Result Value Ref Range    Creatinine Urine mg/dL 43.4 mg/dL    Albumin Urine mg/L <12.0 mg/L    Albumin Urine mg/g Cr           Signed Electronically by: Mimi Bhatt MD

## 2024-04-17 ENCOUNTER — HOSPITAL ENCOUNTER (OUTPATIENT)
Dept: ULTRASOUND IMAGING | Facility: HOSPITAL | Age: 64
Discharge: HOME | End: 2024-04-17
Attending: FAMILY MEDICINE | Admitting: FAMILY MEDICINE
Payer: COMMERCIAL

## 2024-04-17 DIAGNOSIS — Z13.6 SCREENING FOR AAA (ABDOMINAL AORTIC ANEURYSM): ICD-10-CM

## 2024-04-17 DIAGNOSIS — Z87.891 HISTORY OF SMOKING: ICD-10-CM

## 2024-04-17 DIAGNOSIS — K86.9 LESION OF PANCREAS: Primary | ICD-10-CM

## 2024-04-17 PROCEDURE — 76706 US ABDL AORTA SCREEN AAA: CPT

## 2024-04-19 ENCOUNTER — DOCUMENTATION ONLY (OUTPATIENT)
Dept: INTERVENTIONAL RADIOLOGY/VASCULAR | Facility: HOSPITAL | Age: 64
End: 2024-04-19

## 2024-04-19 ENCOUNTER — OFFICE VISIT (OUTPATIENT)
Dept: FAMILY MEDICINE | Facility: OTHER | Age: 64
End: 2024-04-19
Attending: FAMILY MEDICINE
Payer: COMMERCIAL

## 2024-04-19 ENCOUNTER — HOSPITAL ENCOUNTER (OUTPATIENT)
Dept: CT IMAGING | Facility: HOSPITAL | Age: 64
Discharge: HOME | End: 2024-04-19
Attending: FAMILY MEDICINE
Payer: COMMERCIAL

## 2024-04-19 VITALS
WEIGHT: 193.56 LBS | TEMPERATURE: 98.4 F | SYSTOLIC BLOOD PRESSURE: 114 MMHG | OXYGEN SATURATION: 95 % | HEIGHT: 70 IN | HEART RATE: 79 BPM | BODY MASS INDEX: 27.71 KG/M2 | DIASTOLIC BLOOD PRESSURE: 69 MMHG

## 2024-04-19 DIAGNOSIS — K86.9 LESION OF PANCREAS: ICD-10-CM

## 2024-04-19 DIAGNOSIS — K86.89 PANCREATIC MASS: Primary | ICD-10-CM

## 2024-04-19 PROCEDURE — 99214 OFFICE O/P EST MOD 30 MIN: CPT | Performed by: FAMILY MEDICINE

## 2024-04-19 PROCEDURE — G0463 HOSPITAL OUTPT CLINIC VISIT: HCPCS | Mod: 25 | Performed by: FAMILY MEDICINE

## 2024-04-19 PROCEDURE — 74160 CT ABDOMEN W/CONTRAST: CPT

## 2024-04-19 PROCEDURE — 250N000011 HC RX IP 250 OP 636: Performed by: RADIOLOGY

## 2024-04-19 RX ORDER — IOPAMIDOL 755 MG/ML
95 INJECTION, SOLUTION INTRAVASCULAR ONCE
Status: COMPLETED | OUTPATIENT
Start: 2024-04-19 | End: 2024-04-19

## 2024-04-19 RX ADMIN — IOPAMIDOL 95 ML: 755 INJECTION, SOLUTION INTRAVENOUS at 08:33

## 2024-04-19 ASSESSMENT — PAIN SCALES - GENERAL: PAINLEVEL: NO PAIN (0)

## 2024-04-19 NOTE — PROGRESS NOTES
Assessment & Plan     Pancreatic mass  Will get MRI and plan for bx if needed  - MR Pancreas wo & w Contrast; Future  - Lipase; Future  - Amylase; Future    Blood sugar testing frequency justification:  Patient modifying lifestyle changes (diet, exercise) with blood sugars    Patient was agreeable to this plan and had no further questions.  There are no Patient Instructions on file for this visit.    No follow-ups on file.    Chandni Hopper is a 63 year old, presenting for the following health issues:  Results        4/19/2024     4:17 PM   Additional Questions   Roomed by Mica Guido   Accompanied by Wife         4/19/2024     4:17 PM   Patient Reported Additional Medications   Patient reports taking the following new medications None     History of Present Illness       Diabetes:   He presents for follow up of diabetes.  He is checking home blood glucose two times daily.   He checks blood glucose before meals and at bedtime.  Blood glucose is never over 200 and never under 70.     He has no concerns regarding his diabetes at this time.  He is having numbness in feet.            He eats 2-3 servings of fruits and vegetables daily.He consumes 0 sweetened beverage(s) daily.He exercises with enough effort to increase his heart rate 10 to 19 minutes per day.  He exercises with enough effort to increase his heart rate 5 days per week.   He is taking medications regularly.       Concern - CT Results   Onset: 04/19/2024  Description: CT Pancreas results   Accompanying Signs & Symptoms: N/A  Previous history of similar problem: N/A   Therapies tried and outcome: None      Review of Systems  Constitutional, neuro, ENT, endocrine, pulmonary, cardiac, gastrointestinal, genitourinary, musculoskeletal, integument and psychiatric systems are negative, except as otherwise noted.      Objective    /69 (BP Location: Left arm, Patient Position: Sitting, Cuff Size: Adult Regular)   Pulse 79   Temp 98.4  F (36.9  C)  "(Tympanic)   Ht 1.778 m (5' 10\")   Wt 87.8 kg (193 lb 9 oz)   SpO2 95%   BMI 27.77 kg/m    Body mass index is 27.77 kg/m .  Physical Exam   GENERAL: alert and no distress  MS: no gross musculoskeletal defects noted, no edema  PSYCH: mentation appears normal, affect normal/bright    Results for orders placed or performed during the hospital encounter of 04/19/24   CT Pancreas w Contrast     Status: None    Narrative    CT scan of the abdomen with IV contrast    HISTORY: Pancreatic mass    TECHNIQUE: CT scan of the abdomen was performed both in the arterial  venous and delayed phases.    FINDINGS: There is a interstitial thickening seen in both lungs  advanced emphysematous changes are noted.    The liver is free of masses. There is no biliary ductal enlargement.  Some gallbladder wall calcifications are seen. Possible faintly  calcified gallstones are noted. Ultrasound confirmation is recommended    There is a small low-density lesion in the spleen which gradually  enhanced over the delayed images. This most likely represents a benign  splenic lesion.    There is a 1.6 cm in diameter enhancing mass seen in the pancreatic  head. This abuts the portal vein.    The adrenal glands are normal. There is a benign appearing cyst seen  in the right kidney. Left intrarenal calculi are seen. The periaortic  lymph nodes are normal in caliber.    No intraperitoneal abnormalities are seen in the upper abdomen.      Impression    IMPRESSION: 1.6 cm diameter enhancing mass in the pancreatic head this  is best seen on the arterial phase.    Differential diagnostic possibilities include: A pancreatic  neuroendocrine tumor, metastatic disease, pseudopapillary epithelial  neoplasm, acinar cell carcinoma.    Further evaluation with MR of the pancreas is recommended    Probable cholelithiasis with gallbladder wall calcifications    NEDA BARRON MD         SYSTEM ID:  [id number]           Signed Electronically by: Mimi Bhatt, " MD

## 2024-04-19 NOTE — PROGRESS NOTES
IV started with iStat for creatinine.  Creatinine @ 1.0.  Kaye in CT imaging notified.  20G left hand.

## 2024-04-25 ENCOUNTER — HOSPITAL ENCOUNTER (OUTPATIENT)
Dept: MRI IMAGING | Facility: HOSPITAL | Age: 64
Discharge: HOME | End: 2024-04-25
Attending: FAMILY MEDICINE | Admitting: FAMILY MEDICINE
Payer: COMMERCIAL

## 2024-04-25 DIAGNOSIS — K86.89 PANCREATIC MASS: ICD-10-CM

## 2024-04-25 PROCEDURE — A9585 GADOBUTROL INJECTION: HCPCS | Performed by: RADIOLOGY

## 2024-04-25 PROCEDURE — 74183 MRI ABD W/O CNTR FLWD CNTR: CPT

## 2024-04-25 PROCEDURE — 255N000002 HC RX 255 OP 636: Performed by: RADIOLOGY

## 2024-04-25 RX ORDER — GADOBUTROL 604.72 MG/ML
10 INJECTION INTRAVENOUS ONCE
Status: COMPLETED | OUTPATIENT
Start: 2024-04-25 | End: 2024-04-25

## 2024-04-25 RX ADMIN — GADOBUTROL 10 ML: 604.72 INJECTION INTRAVENOUS at 16:03

## 2024-04-26 ENCOUNTER — MYC MEDICAL ADVICE (OUTPATIENT)
Dept: FAMILY MEDICINE | Facility: OTHER | Age: 64
End: 2024-04-26

## 2024-04-26 DIAGNOSIS — K86.89 PANCREATIC MASS: Primary | ICD-10-CM

## 2024-04-26 NOTE — TELEPHONE ENCOUNTER
Ward ELKINS  Family Care Team 2 (supporting Mimi Bhatt MD)1 hour ago (2:48 PM)   apparently Prabha went to [facility]  so guess that is where I would go  thanks     they could not connect me through the phone     Ward      _______________________________________________    Dr. Bhatt~  I called the Pt but still not quite sure what type of referral to pend....  Sorry!    Thank you,   LC

## 2024-05-03 ENCOUNTER — TELEPHONE (OUTPATIENT)
Dept: FAMILY MEDICINE | Facility: OTHER | Age: 64
End: 2024-05-03

## 2024-05-03 DIAGNOSIS — K86.89 PANCREATIC MASS: Primary | ICD-10-CM

## 2024-05-03 NOTE — TELEPHONE ENCOUNTER
Cara from [facility] called back stating she was missing the referral, but got 22 pages.  I manually printed and resent the referral.

## 2024-05-03 NOTE — TELEPHONE ENCOUNTER
2:56 PM    Reason for Call: Phone Call    Description: [facility] Referral calling needs the G.[facility]. Referral refax they only received the cover letter that is marked Urgent and that is it. They need this refaxed to [phone]    Was an appointment offered for this call? No  If yes : Appointment type              Date    Preferred method for responding to this message: Telephone Call  What is your phone number ? [phone]    If we cannot reach you directly, may we leave a detailed response at the number you provided? Yes    Can this message wait until your PCP/provider returns, if available today? YES, No

## 2024-05-05 NOTE — PROGRESS NOTES
"Junior Castellanos is a 63 year old male who is being evaluated via a billable video visit.       The patient has been notified of following:      \"This video visit will be conducted via a call between you and your physician/provider. We have found that certain health care needs can be provided without the need for an in-person physical exam.  This service lets us provide the care you need with a video conversation.  If a prescription is necessary we can send it directly to your pharmacy.  If lab work is needed we can place an order for that and you can then stop by our lab to have the test done at a later time.     Video visits are billed at different rates depending on your insurance coverage.  Please reach out to your insurance provider with any questions.     If during the course of the call the physician/provider feels a video visit is not appropriate, you will not be charged for this service.\"     Patient has given verbal consent for Video visit? Yes  How would you like to obtain your AVS? Mail a copy  If you are dropped from the video visit, the video invite should be resent to: Text to cell phone: -  Will anyone else be joining your video visit? No  If patient encounters technical issues they should call [phone]      Video-Visit Details     Type of service:  Video Visit     Start Time:  1100  End Time:  1115    Originating Location (pt. Location): Home     Distant Location (provider location):  Off-site, [facility] [place]       Platform used for Video Visit: DocLanding    Virtual visit for annual follow-up for mild obstructive sleep apnea managed with CPAP.     A/P:     1.)  History of mild JAGUAR  - Presenting concerns of snoring, hypersomnia  - Weight appears stable  -Appears well controlled with excellent compliance on CPAP auto titrate 11-20 cm H2O.  - Continue CPAP on current settings.  - Follow-up in 1 year given insurance need for yearly updated CPAP supply prescription    The " longitudinal plan of care for the diagnosis(es)/condition(s) as documented were addressed during this visit. Due to the added complexity in care, I will continue to support Ward in the subsequent management and with ongoing continuity of care.      SUBJECTIVE:  Junior Castellanos is a 63 year old male.    Pertinent PMHx of JAGUAR, chronic respiratory failure, DM II, obesity.     Prior Sleep Testing:  3/15/2013 - PSG with weight 205 lbs, BMI 29.7.  AHI 10.3, baseline SpO2 95.9%, beth 81.6%.  PLM index 23.9.  Average HR 67.2.    3/27/2023 -he presents today with a desire to get a replacement CPAP machine.  His current CPAP is original from his previous sleep testing in 2013, and has been displaying the end of motor life warning for almost 2 years.  Otherwise, he feels very well rested and sleeps fantastically with his CPAP, feels he cannot sleep without it.  He denies any residual snoring or observed apnea.     CPAP download reviewed from February 17 through March 18, 2023 on auto titrate with settings of 11.6-20 cm H2O.  Average daily usage 6 hours and 51 minutes.  Pressure median 13.2 cm H2O, 95th percentile 16.3 cm H2O.  AHI 0.3.     A/P to continue CPAP, replacement device on current settings of CPAP auto-titrate 11.6 - 20 cm H2O.     6/26/2023 - He presents today for CPAP compliance follow-up after receiving his replacement CPAP.  He feels it is working very well, very comfortable and has no concerns today.     CPAP download from 5/27/2023 - 6/25/2023 on auto-titrate 11-20 cm H2O.  Used 30/30 days, average usage 7 hours 3 minutes.  Pressure median 11.1 cm H2O, 95th%ile 12.3 cm H2O.  AHI 0.3.    A/P to continue CPAP 11-20.    Today -in general, he feels that he continues to do very well with his CPAP and has no sleep concerns.  We did review the typical time frames replacements of the various pieces including filters, tubing, headgear, cushion, water chamber.    CPAP download reviewed from 4/6/2024 - 5/5/2024 on  auto-titrate 11-20 cm H2O.  Used 30/30 days, average usage 7 hours 12 minutes.  AHI 0.2.  Resmed Airsense 11 device.      Past medical history:    Patient Active Problem List    Diagnosis Date Noted    Screening for AAA (abdominal aortic aneurysm) 04/12/2024     Priority: Medium    History of smoking 04/12/2024     Priority: Medium    Screening for prostate cancer 10/04/2023     Priority: Medium    Gastroesophageal reflux disease without esophagitis 11/16/2022     Priority: Medium    Screening for HIV (human immunodeficiency virus) 11/16/2022     Priority: Medium    Need for hepatitis C screening test 11/16/2022     Priority: Medium    JAGUAR (obstructive sleep apnea) 11/16/2022     Priority: Medium    ACP (advance care planning) 11/16/2022     Priority: Medium    Hypovitaminosis D 11/16/2022     Priority: Medium    Pneumonia due to COVID-19 virus 12/29/2021     Priority: Medium    Infection due to 2019 novel coronavirus 11/16/2021     Priority: Medium    Hyperlipidemia LDL goal <100 01/17/2020     Priority: Medium    Mixed hyperlipidemia 01/17/2020     Priority: Medium    Type 2 diabetes mellitus with hyperglycemia, without long-term current use of insulin (H) 11/18/2019     Priority: Medium     Uncontrolled        JAGUAR on CPAP      Priority: Medium       10 point ROS of systems including Constitutional, Eyes, Respiratory, Cardiovascular, Gastroenterology, Genitourinary, Integumentary, Muscularskeletal, Psychiatric were all negative except for pertinent positives noted in my HPI.    Current Outpatient Medications   Medication Sig Dispense Refill    aspirin 81 MG EC tablet Take 81 mg by mouth daily      atorvastatin (LIPITOR) 10 MG tablet TAKE 1 TABLET BY MOUTH DAILY 90 tablet 2    blood glucose (CONTOUR NEXT TEST) test strip USE TO TEST BLOOD SUGAR ONE TIME DAILY 100 strip 3    diclofenac (VOLTAREN) 1 % topical gel Apply 4 g topically 4 times daily (Patient not taking: Reported on 4/12/2024) 100 g 0    lactobacillus  rhamnosus (GG) (CULTURELL) capsule Take 1 capsule by mouth 2 times daily      lisinopril (ZESTRIL) 2.5 MG tablet Take 1 tablet (2.5 mg) by mouth daily 30 tablet 3    metFORMIN (GLUCOPHAGE XR) 500 MG 24 hr tablet Take 1 tablet (500 mg) by mouth 2 times daily (with meals) 60 tablet 1    Microlet Lancets MISC USE TO TEST 1 TIME DAILY 100 each 3    tiZANidine (ZANAFLEX) 2 MG tablet TAKE 1 TABLET BY MOUTH TWICE DAILY AS NEEDED FOR MUSCLE SPASMS (Patient not taking: Reported on 4/12/2024) 40 tablet 1    UNABLE TO FIND MEDICATION NAME: Multivitamin plant base liquid, 2 Tbsp daily      Vitamin D, Cholecalciferol, 10 MCG (400 UNIT) TABS Take 800 Units by mouth daily         OBJECTIVE:  There were no vitals taken for this visit.    Physical Exam     ---  This note was written with the assistance of the Dragon voice-dictation technology software. The final document, although reviewed, may contain errors. For corrections, please contact the office.    Total time spent preparing to see the patient, review of chart, obtaining history and physical examination, review of sleep testing, review of treatment options, education, discussion with patient and documenting in Epic / EMR was 20 minutes.  All time involved was spent on the day of service for the patient (the same day as the patient's appointment).    Bo Armenta MD    Sleep Medicine  [facility] [place], [place]  Main Office: [phone]  [facility] [place]  [address] [place] [address], [address]  Schedule visits: [phone]  Main Office: [phone]  Fax: [phone]

## 2024-05-06 ENCOUNTER — VIRTUAL VISIT (OUTPATIENT)
Dept: PULMONOLOGY | Facility: OTHER | Age: 64
End: 2024-05-06
Attending: FAMILY MEDICINE
Payer: COMMERCIAL

## 2024-05-06 VITALS — HEIGHT: 70 IN | BODY MASS INDEX: 28.06 KG/M2 | WEIGHT: 196 LBS

## 2024-05-06 DIAGNOSIS — G47.10 HYPERSOMNIA: ICD-10-CM

## 2024-05-06 DIAGNOSIS — G47.33 OSA (OBSTRUCTIVE SLEEP APNEA): Primary | ICD-10-CM

## 2024-05-06 PROCEDURE — G2211 COMPLEX E/M VISIT ADD ON: HCPCS | Mod: 95 | Performed by: FAMILY MEDICINE

## 2024-05-06 PROCEDURE — 99213 OFFICE O/P EST LOW 20 MIN: CPT | Mod: 95 | Performed by: FAMILY MEDICINE

## 2024-05-06 ASSESSMENT — PAIN SCALES - GENERAL: PAINLEVEL: NO PAIN (0)

## 2024-05-06 NOTE — PROGRESS NOTES
"Virtual Visit Details    Type of service:  Video Visit   Video Start Time: {video visit start/end time for provider to select:283999}  Video End Time:{video visit start/end time for provider to select:490838}    Originating Location (pt. Location): {video visit patient location:018284::\"Home\"}  {PROVIDER LOCATION On-site should be selected for visits conducted from your clinic location or adjoining [facility] hospital, academic office, or other nearby [facility] building. Off-site should be selected for all other provider locations, including home:977129}  Distant Location (provider location):  {virtual location provider:974925}  Platform used for Video Visit: {Virtual Visit Platforms:654608::\"[employer]\"}    "

## 2024-05-06 NOTE — NURSING NOTE
Is the patient currently in the state of [place]? YES    Visit mode:VIDEO    If the visit is dropped, the patient can be reconnected by: VIDEO VISIT: Text to cell phone:   Telephone Information:   Mobile [phone]       Will anyone else be joining the visit? NO  (If patient encounters technical issues they should call [phone])    How would you like to obtain your AVS? MyChart    Are changes needed to the allergy or medication list? No    Are refills needed on medications prescribed by this physician? NO    Reason for visit: RECHECK    Alvarez LANCASTER

## 2024-05-06 NOTE — TELEPHONE ENCOUNTER
Called Junior back. Had attempted x2 prior to today and LM. Spoke with Junior today. BG readings are ok after decreasing Metformin. Reports some diarrhea and bloating still. Will follow after next A1C.   Count Minor/Major Decisions Toward Mdm (Not Cosmetic)?: No Detail Level: Simple

## 2024-05-06 NOTE — TELEPHONE ENCOUNTER
[facility] staff called regarding GI referral. She states they have reviewed the referral and unable to get patient in in a timely manner. She states they are booked out until at least June, so they are recommending the patient be referred elsewhere if he needs to be seen right away.

## 2024-05-06 NOTE — PROGRESS NOTES
CPAP Compliance Visit:       Name: Junior Castellanos MRN# [id number]   Age: 63 year old YOB: 1960     Date of Consultation: May 6, 2024  Primary care provider: Mimi Bhatt    Compliance:   100 % of days with >4 hours of use.   0days/30 with no use   Average use: 7hours 14minutes per day   95%ile leak 21 L/min   CPAP 95% pressure 11.9 cm   AHI 0.2 events/hour   ELIZA 0  PB 0%     Impression:   Patient is compliant with CPAP therapy and using CPAP with no issues. Continue to use CPAP and follow up with sleep medicine as necessary.

## 2024-05-06 NOTE — TELEPHONE ENCOUNTER
I spoke with Jessica at [facility] and she will look to see all the pages were received and call me back only if they did not get it all.

## 2024-05-07 NOTE — TELEPHONE ENCOUNTER
Patient and spouse prefer closer, so they said ok to send to [facility].  Patient's wife did not think June was too far out either for [facility].

## 2024-05-07 NOTE — TELEPHONE ENCOUNTER
I called Leeanne and Jessica transferred me to [facility] in GI and she said she would contact the pt to schedule for next available-June.  Also, I sent the referral to [facility].

## 2024-05-13 ENCOUNTER — MYC MEDICAL ADVICE (OUTPATIENT)
Dept: FAMILY MEDICINE | Facility: OTHER | Age: 64
End: 2024-05-13

## 2024-05-26 DIAGNOSIS — R73.03 PREDIABETES: ICD-10-CM

## 2024-05-28 RX ORDER — LISINOPRIL 2.5 MG/1
2.5 TABLET ORAL DAILY
Qty: 60 TABLET | Refills: 5 | Status: SHIPPED | OUTPATIENT
Start: 2024-05-28

## 2024-06-17 NOTE — COMMUNITY RESOURCES LIST (ENGLISH)
11/16/2022   [facility]  N/A  For questions about this resource list or additional care needs, please contact your primary care clinic or care manager.  Phone: [phone]   Email: N/A   Address: [address] [place] [address]   Hours: N/A        Exercise and Recreation       Gym or workout facility  1  [facility] Distance: 21.38 miles      COVID-19 Status: Regular Operations   [address] [place] [address]  Language: English  Hours: Mon - Fri 5:00 AM - 7:00 PM , Sat 9:00 AM - 5:00 PM  Fees: Free, Self Pay   Phone: [phone]) [phone] Email: zeus@[employer] Website: http://www.[employer] ([employer])/     2  [employer] [facility] [place] Distance: 21.52 miles      COVID-19 Status: Regular Operations   [address] [place] [address]  Language: English  Hours: Mon - Sun Open 24 Hours  Fees: Insurance, Self Pay   Phone: [phone] Email: [place]@[employer] Website: https://www.[employer]/gyms/40/fmvkdnjt-rz-71988/          Important Numbers & Websites       Emergency Services   911  City Services   311  Poison Control   (140) 438-9013  Suicide Prevention Lifeline   (320) 606-6436 (TALK)  Child Abuse Hotline   (311) 980-1594 (4-A-Child)  Sexual Assault Hotline   (932) 457-5362 (HOPE)  National Runaway Safeline   (265) 821-3342 (RUNAWAY)  All-Options Talkline   (502) 130-3201  Substance Abuse Referral   (300) 583-6028 (HELP)     Quality 431: Preventive Care And Screening: Unhealthy Alcohol Use - Screening: Patient not identified as an unhealthy alcohol user when screened for unhealthy alcohol use using a systematic screening method Quality 47: Advance Care Plan: Advance Care Planning discussed and documented in the medical record; patient did not wish or was not able to name a surrogate decision maker or provide an advance care plan. Quality 226: Preventive Care And Screening: Tobacco Use: Screening And Cessation Intervention: Patient screened for tobacco use and is an ex/non-smoker Detail Level: Detailed

## 2024-08-08 NOTE — H&P
8/8/24 1450- I called and spoke with Milka regarding new orders listed below. She repeated orders back to me and verbalized understanding. Made f/u appt in 1 month.  _______________________________  Tomasa Coker, APRN - Mae De La Torre RN  Labs and CHF Clinic note reviewed  Vitals: 163/62, 61    Please have her start hydralazine 10 mg three times daily  Isordil 5 mg TID    Follow up in CHF clinic in 1 month  __________________________________    D. Negrito KRAMER.  
Error, please disregard.     
50 MG tablet Take 0.5 tablets by mouth in the morning and at bedtime  Jens Brito MD   allopurinol (ZYLOPRIM) 100 MG tablet TAKE ONE (1) TABLET BY MOUTH TWO TIMES DAILY.  Jens Brito MD   JARDIANCE 10 MG tablet TAKE 1 TABLET BY MOUTH EVERY DAY  Tiffanie Tineo PA-C   ENTRESTO  MG per tablet TAKE ONE (1) TABLET BY MOUTH TWO TIMES DAILY.  Tiffanie Tineo PA-C   bumetanide (BUMEX) 1 MG tablet TAKE ONE (1) TABLET DAILY.  Fatoumata Olea MD   metoprolol succinate (TOPROL XL) 100 MG extended release tablet Take 1 tablet by mouth in the morning and at bedtime  Raf Hendrickson MD   bumetanide (BUMEX) 0.5 MG tablet TAKE (2) TABLETS DAILY AND MAY INCREASE 2 TABS AS NEEDED. TAKE 2 B/C THESE ARE HALF STRENGTH  Raf Hendrickson MD   meloxicam (MOBIC) 7.5 MG tablet Take 1 tablet by mouth daily  Patient not taking: Reported on 8/8/2024  Provider, MD Catalina   albuterol sulfate HFA (VENTOLIN HFA) 108 (90 Base) MCG/ACT inhaler Inhale 2 puffs into the lungs 4 times daily as needed for Wheezing  Tiffanie Tineo PA-C   cetirizine (ZYRTEC) 10 MG tablet Take 1 tablet by mouth daily  Tiffanie Tineo PA-C   spironolactone (ALDACTONE) 25 MG tablet Take 0.5 tablets by mouth daily  Tiffanie Tineo PA-C   ASPIRIN LOW DOSE 81 MG EC tablet take 1 tablet by mouth every day  Tiffanie Tineo PA-C   Baclofen (LIORESAL) 5 MG tablet Take 1 tablet by mouth 3 times daily  Patient not taking: Reported on 4/29/2024  Tiffanie Tineo PA-C   albuterol sulfate HFA (PROVENTIL;VENTOLIN;PROAIR) 108 (90 Base) MCG/ACT inhaler Inhale 2 puffs into the lungs every 4 hours as needed for Wheezing  Tiffanie Tineo PA-C   ondansetron (ZOFRAN) 4 MG tablet Take 1 tablet by mouth every 12 hours as needed for Nausea or Vomiting  Tineo, Tiffanie M, PA-C   fluticasone (FLONASE) 50 MCG/ACT nasal spray 2 sprays by Each Nostril route daily  Tiffanie Tineo PA-C   fluticasone (FLOVENT HFA) 220 MCG/ACT inhaler 2 inh bid  Tiffanie Tineo PA-C   atorvastatin (LIPITOR) 40 MG tablet

## 2024-08-12 ENCOUNTER — OFFICE VISIT (OUTPATIENT)
Dept: FAMILY MEDICINE | Facility: OTHER | Age: 64
End: 2024-08-12
Attending: FAMILY MEDICINE
Payer: COMMERCIAL

## 2024-08-12 ENCOUNTER — LAB (OUTPATIENT)
Dept: LAB | Facility: OTHER | Age: 64
End: 2024-08-12
Attending: FAMILY MEDICINE
Payer: COMMERCIAL

## 2024-08-12 VITALS
WEIGHT: 188.6 LBS | SYSTOLIC BLOOD PRESSURE: 96 MMHG | OXYGEN SATURATION: 96 % | RESPIRATION RATE: 18 BRPM | HEART RATE: 55 BPM | BODY MASS INDEX: 27 KG/M2 | DIASTOLIC BLOOD PRESSURE: 64 MMHG | HEIGHT: 70 IN | TEMPERATURE: 97.4 F

## 2024-08-12 DIAGNOSIS — K86.89 PANCREATIC MASS: ICD-10-CM

## 2024-08-12 DIAGNOSIS — E55.9 HYPOVITAMINOSIS D: ICD-10-CM

## 2024-08-12 DIAGNOSIS — R73.03 PRE-DIABETES: Primary | ICD-10-CM

## 2024-08-12 DIAGNOSIS — E78.2 MIXED HYPERLIPIDEMIA: ICD-10-CM

## 2024-08-12 DIAGNOSIS — R73.03 PREDIABETES: ICD-10-CM

## 2024-08-12 DIAGNOSIS — L72.3 SEBACEOUS CYST: ICD-10-CM

## 2024-08-12 DIAGNOSIS — G47.33 OSA (OBSTRUCTIVE SLEEP APNEA): ICD-10-CM

## 2024-08-12 DIAGNOSIS — L21.9 SEBORRHEIC DERMATITIS: ICD-10-CM

## 2024-08-12 LAB
EST. AVERAGE GLUCOSE BLD GHB EST-MCNC: 120 MG/DL
HBA1C MFR BLD: 5.8 %

## 2024-08-12 PROCEDURE — 82306 VITAMIN D 25 HYDROXY: CPT | Mod: ZL

## 2024-08-12 PROCEDURE — 83036 HEMOGLOBIN GLYCOSYLATED A1C: CPT | Mod: ZL

## 2024-08-12 PROCEDURE — G0463 HOSPITAL OUTPT CLINIC VISIT: HCPCS

## 2024-08-12 PROCEDURE — 99214 OFFICE O/P EST MOD 30 MIN: CPT | Performed by: FAMILY MEDICINE

## 2024-08-12 PROCEDURE — 36415 COLL VENOUS BLD VENIPUNCTURE: CPT | Mod: ZL

## 2024-08-12 PROCEDURE — G2211 COMPLEX E/M VISIT ADD ON: HCPCS | Performed by: FAMILY MEDICINE

## 2024-08-12 ASSESSMENT — PAIN SCALES - GENERAL: PAINLEVEL: NO PAIN (0)

## 2024-08-12 NOTE — PROGRESS NOTES
Assessment & Plan     Pre-diabetes  No longer diabetic, he is continuing to work on weight loss  Continue metformin     Mixed hyperlipidemia  stable    Pancreatic mass  Stable, will have repeat ERCP in May 2025    JAGUAR (obstructive sleep apnea)  stable    Hypovitaminosis D  pending  - Vitamin D Deficiency; Future    Seborrheic dermatitis  Reviewed options otc and he is currently using tea tree oil which is helping    Sebaceous cyst  If not improving, would try keflex    Patient was agreeable to this plan and had no further questions.  There are no Patient Instructions on file for this visit.    No follow-ups on file.    Chandni Hopper is a 63 year old, presenting for the following health issues:  Diabetes        8/12/2024     2:02 PM   Additional Questions   Roomed by Damaris MARTIN   Accompanied by wife- Prabha     History of Present Illness       Diabetes:   He presents for follow up of diabetes.  He is checking home blood glucose two times daily.   He checks blood glucose before meals and at bedtime.  Blood glucose is never over 200 and never under 70.  When his blood glucose is low, the patient is asymptomatic for confusion, blurred vision, lethargy and reports not feeling dizzy, shaky, or weak.   He has no concerns regarding his diabetes at this time.  He is having numbness in feet.            He eats 2-3 servings of fruits and vegetables daily.He consumes 0 sweetened beverage(s) daily.He exercises with enough effort to increase his heart rate 10 to 19 minutes per day.  He exercises with enough effort to increase his heart rate 5 days per week.   He is taking medications regularly.       Diabetes Follow-up  How often are you checking your blood sugar? Two times daily  Blood sugar testing frequency justification:  n/a  What time of day are you checking your blood sugars (select all that apply)?  Before meals and At bedtime  Have you had any blood sugars above 200?  No  Have you had any blood sugars below 70?   "No  What symptoms do you notice when your blood sugar is low?  Lethargy, Blurred vision, and Confusion  What concerns do you have today about your diabetes? None   Do you have any of these symptoms? (Select all that apply)  Numbness in feet  BP Readings from Last 2 Encounters:   08/12/24 96/64   04/19/24 114/69     Hemoglobin A1C (%)   Date Value   08/12/2024 5.8 (H)   04/12/2024 6.2 (H)   07/25/2022 6.7 (A)   01/17/2020 7.8 (A)     LDL Cholesterol Calculated (mg/dL)   Date Value   02/28/2024 35   10/04/2023 46   11/18/2019 70         Hyperlipidemia Follow-Up  Are you regularly taking any medication or supplement to lower your cholesterol?   Yes- atorvastatin  Are you having muscle aches or other side effects that you think could be caused by your cholesterol lowering medication?  No      Review of Systems  Constitutional, HEENT, cardiovascular, pulmonary, GI, , musculoskeletal, neuro, skin, endocrine and psych systems are negative, except as otherwise noted.      Objective    BP 96/64 (BP Location: Left arm, Patient Position: Sitting, Cuff Size: Adult Regular)   Pulse 55   Temp 97.4  F (36.3  C) (Tympanic)   Resp 18   Ht 1.778 m (5' 10\")   Wt 85.5 kg (188 lb 9.6 oz)   SpO2 96%   BMI 27.06 kg/m    Body mass index is 27.06 kg/m .  Physical Exam   GENERAL: alert and no distress  NECK: no adenopathy, no asymmetry, masses, or scars  RESP: lungs clear to auscultation - no rales, rhonchi or wheezes  CV: regular rate and rhythm, normal S1 S2, no S3 or S4, no murmur, click or rub, no peripheral edema  ABDOMEN: soft, nontender, no hepatosplenomegaly, no masses and bowel sounds normal  MS: no gross musculoskeletal defects noted, no edema  SKIN: erythema - neck and scalp  PSYCH: mentation appears normal, affect normal/bright    Results for orders placed or performed in visit on 08/12/24   Hemoglobin A1c     Status: Abnormal   Result Value Ref Range    Estimated Average Glucose 120 mg/dL    Hemoglobin A1C 5.8 (H) <5.7 " %           Signed Electronically by: Mimi Bhatt MD

## 2024-08-12 NOTE — PROGRESS NOTES
The longitudinal plan of care for the diagnosis(es)/condition(s) as documented were addressed during this visit. Due to the added complexity in care, I will continue to support Ward in the subsequent management and with ongoing continuity of care.    Answers submitted by the patient for this visit:  Diabetes Visit (Submitted on 8/7/2024)  Chief Complaint: Chronic problems general questions HPI Form  Frequency of checking blood sugars:: two times daily  What time of day are you checking your blood sugars : before meals, at bedtime  Have you had any blood sugars above 200?: No  Have you had any blood sugars below 70?: No  Hypoglycemia symptoms:: none  Diabetic concerns:: none  Paraesthesia present:: numbness in feet  General Questionnaire (Submitted on 8/7/2024)  Chief Complaint: Chronic problems general questions HPI Form  How many servings of fruits and vegetables do you eat daily?: 2-3  On average, how many sweetened beverages do you drink each day (Examples: soda, juice, sweet tea, etc.  Do NOT count diet or artificially sweetened beverages)?: 0  How many minutes a day do you exercise enough to make your heart beat faster?: 10 to 19  How many days a week do you exercise enough to make your heart beat faster?: 5  How many days per week do you miss taking your medication?: 0

## 2024-08-13 LAB — VIT D+METAB SERPL-MCNC: 114 NG/ML (ref 20–50)

## 2024-08-16 PROBLEM — R73.03 PREDIABETES: Status: ACTIVE | Noted: 2019-11-18

## 2024-10-10 DIAGNOSIS — R73.03 PREDIABETES: ICD-10-CM

## 2024-10-10 RX ORDER — METFORMIN HYDROCHLORIDE 500 MG/1
500 TABLET, EXTENDED RELEASE ORAL 2 TIMES DAILY WITH MEALS
Qty: 180 TABLET | Refills: 2 | Status: SHIPPED | OUTPATIENT
Start: 2024-10-10

## 2024-10-10 NOTE — TELEPHONE ENCOUNTER
Metformin (Glucophage XR) 500 MG tablet    Last Written Prescription Date:  02/06/2024  Last Fill Quantity: 60,   # refills: 0  Last Office Visit: 08/12/2024

## 2024-12-19 ENCOUNTER — MYC MEDICAL ADVICE (OUTPATIENT)
Dept: FAMILY MEDICINE | Facility: OTHER | Age: 64
End: 2024-12-19

## 2024-12-23 DIAGNOSIS — R73.03 PREDIABETES: ICD-10-CM

## 2024-12-23 RX ORDER — LANCETS
EACH MISCELLANEOUS
Qty: 100 EACH | Refills: 3 | Status: SHIPPED | OUTPATIENT
Start: 2024-12-23

## 2024-12-23 NOTE — TELEPHONE ENCOUNTER
Microlet lancets       Last Written Prescription Date:  02/08/2024  Last Fill Quantity: 100,   # refills: 3  Last Office Visit: 08/12/2024  Future Office visit:    Next 5 appointments (look out 90 days)      Jan 29, 2025 10:30 AM  (Arrive by 10:15 AM)  Adult Preventative Visit with Mimi Bhatt MD  [facility] [place] ([facility] [place] ) [address]  [place] [address]  [phone]             Routing refill request to provider for review/approval because:  Diabetic Supplies Protocol Failed    Rerun Protocol (12/23/2024 6:13 AM)    Medication indicated for associated diagnosis

## 2024-12-28 DIAGNOSIS — E78.2 MIXED HYPERLIPIDEMIA: ICD-10-CM

## 2024-12-30 RX ORDER — ATORVASTATIN CALCIUM 10 MG/1
10 TABLET, FILM COATED ORAL DAILY
Qty: 30 TABLET | Refills: 0 | Status: SHIPPED | OUTPATIENT
Start: 2024-12-30

## 2025-01-26 SDOH — HEALTH STABILITY: PHYSICAL HEALTH: ON AVERAGE, HOW MANY MINUTES DO YOU ENGAGE IN EXERCISE AT THIS LEVEL?: 30 MIN

## 2025-01-26 SDOH — HEALTH STABILITY: PHYSICAL HEALTH: ON AVERAGE, HOW MANY DAYS PER WEEK DO YOU ENGAGE IN MODERATE TO STRENUOUS EXERCISE (LIKE A BRISK WALK)?: 7 DAYS

## 2025-01-26 ASSESSMENT — SOCIAL DETERMINANTS OF HEALTH (SDOH): HOW OFTEN DO YOU GET TOGETHER WITH FRIENDS OR RELATIVES?: NEVER

## 2025-01-29 ENCOUNTER — OFFICE VISIT (OUTPATIENT)
Dept: FAMILY MEDICINE | Facility: OTHER | Age: 65
End: 2025-01-29
Attending: FAMILY MEDICINE
Payer: COMMERCIAL

## 2025-01-29 ENCOUNTER — LAB (OUTPATIENT)
Dept: LAB | Facility: OTHER | Age: 65
End: 2025-01-29
Attending: FAMILY MEDICINE
Payer: COMMERCIAL

## 2025-01-29 VITALS
TEMPERATURE: 97.6 F | HEIGHT: 70 IN | OXYGEN SATURATION: 98 % | HEART RATE: 65 BPM | BODY MASS INDEX: 26.81 KG/M2 | WEIGHT: 187.25 LBS | RESPIRATION RATE: 16 BRPM

## 2025-01-29 DIAGNOSIS — E78.2 MIXED HYPERLIPIDEMIA: ICD-10-CM

## 2025-01-29 DIAGNOSIS — E55.9 HYPOVITAMINOSIS D: ICD-10-CM

## 2025-01-29 DIAGNOSIS — G47.33 OSA ON CPAP: ICD-10-CM

## 2025-01-29 DIAGNOSIS — R73.03 PREDIABETES: ICD-10-CM

## 2025-01-29 DIAGNOSIS — K86.89 PANCREATIC MASS: ICD-10-CM

## 2025-01-29 DIAGNOSIS — Z00.00 ANNUAL PHYSICAL EXAM: Primary | ICD-10-CM

## 2025-01-29 LAB
ALBUMIN SERPL BCG-MCNC: 4.8 G/DL (ref 3.5–5.2)
ALP SERPL-CCNC: 56 U/L (ref 40–150)
ALT SERPL W P-5'-P-CCNC: 22 U/L (ref 0–70)
ANION GAP SERPL CALCULATED.3IONS-SCNC: 11 MMOL/L (ref 7–15)
AST SERPL W P-5'-P-CCNC: 20 U/L (ref 0–45)
BILIRUB SERPL-MCNC: 0.4 MG/DL
BUN SERPL-MCNC: 19.4 MG/DL (ref 8–23)
CALCIUM SERPL-MCNC: 9.4 MG/DL (ref 8.8–10.4)
CHLORIDE SERPL-SCNC: 101 MMOL/L (ref 98–107)
CHOLEST SERPL-MCNC: 166 MG/DL
CREAT SERPL-MCNC: 0.91 MG/DL (ref 0.67–1.17)
CREAT UR-MCNC: 52.5 MG/DL
EGFRCR SERPLBLD CKD-EPI 2021: >90 ML/MIN/1.73M2
EST. AVERAGE GLUCOSE BLD GHB EST-MCNC: 117 MG/DL
FASTING STATUS PATIENT QL REPORTED: YES
FASTING STATUS PATIENT QL REPORTED: YES
GLUCOSE SERPL-MCNC: 144 MG/DL (ref 70–99)
HBA1C MFR BLD: 5.7 %
HCO3 SERPL-SCNC: 26 MMOL/L (ref 22–29)
HDLC SERPL-MCNC: 49 MG/DL
LDLC SERPL CALC-MCNC: 101 MG/DL
MICROALBUMIN UR-MCNC: <12 MG/L
MICROALBUMIN/CREAT UR: NORMAL MG/G{CREAT}
NONHDLC SERPL-MCNC: 117 MG/DL
POTASSIUM SERPL-SCNC: 4.2 MMOL/L (ref 3.4–5.3)
PROT SERPL-MCNC: 7.5 G/DL (ref 6.4–8.3)
SODIUM SERPL-SCNC: 138 MMOL/L (ref 135–145)
TRIGL SERPL-MCNC: 80 MG/DL

## 2025-01-29 PROCEDURE — 83036 HEMOGLOBIN GLYCOSYLATED A1C: CPT | Mod: ZL

## 2025-01-29 PROCEDURE — 82043 UR ALBUMIN QUANTITATIVE: CPT | Mod: ZL

## 2025-01-29 PROCEDURE — 82570 ASSAY OF URINE CREATININE: CPT | Mod: ZL

## 2025-01-29 PROCEDURE — 82040 ASSAY OF SERUM ALBUMIN: CPT | Mod: ZL

## 2025-01-29 PROCEDURE — 82306 VITAMIN D 25 HYDROXY: CPT | Mod: ZL

## 2025-01-29 PROCEDURE — 80061 LIPID PANEL: CPT | Mod: ZL

## 2025-01-29 PROCEDURE — 84132 ASSAY OF SERUM POTASSIUM: CPT | Mod: ZL

## 2025-01-29 SDOH — HEALTH STABILITY: PHYSICAL HEALTH: ON AVERAGE, HOW MANY DAYS PER WEEK DO YOU ENGAGE IN MODERATE TO STRENUOUS EXERCISE (LIKE A BRISK WALK)?: 7 DAYS

## 2025-01-29 SDOH — HEALTH STABILITY: PHYSICAL HEALTH: ON AVERAGE, HOW MANY MINUTES DO YOU ENGAGE IN EXERCISE AT THIS LEVEL?: 60 MIN

## 2025-01-29 ASSESSMENT — PAIN SCALES - GENERAL: PAINLEVEL_OUTOF10: MILD PAIN (1)

## 2025-01-29 ASSESSMENT — LIFESTYLE VARIABLES
SKIP TO QUESTIONS 9-10: 1
HOW OFTEN DO YOU HAVE SIX OR MORE DRINKS ON ONE OCCASION: NEVER
HOW MANY STANDARD DRINKS CONTAINING ALCOHOL DO YOU HAVE ON A TYPICAL DAY: PATIENT DOES NOT DRINK
AUDIT-C TOTAL SCORE: 0
HOW OFTEN DO YOU HAVE A DRINK CONTAINING ALCOHOL: NEVER
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
## 2025-01-30 PROBLEM — R73.03 PREDIABETES: Status: ACTIVE | Noted: 2019-11-18

## 2025-01-30 LAB — VIT D+METAB SERPL-MCNC: 116 NG/ML (ref 20–50)

## 2025-02-28 ENCOUNTER — HOSPITAL ENCOUNTER (OUTPATIENT)
Dept: EDUCATION SERVICES | Facility: HOSPITAL | Age: 65
Discharge: HOME | End: 2025-02-28
Attending: DIETITIAN, REGISTERED | Admitting: FAMILY MEDICINE
Payer: COMMERCIAL

## 2025-02-28 VITALS
WEIGHT: 197.9 LBS | SYSTOLIC BLOOD PRESSURE: 120 MMHG | RESPIRATION RATE: 16 BRPM | DIASTOLIC BLOOD PRESSURE: 72 MMHG | HEART RATE: 59 BPM | OXYGEN SATURATION: 96 % | HEIGHT: 70 IN | BODY MASS INDEX: 28.33 KG/M2

## 2025-02-28 DIAGNOSIS — R73.03 PREDIABETES: Primary | ICD-10-CM

## 2025-02-28 PROCEDURE — 999N000218 HC NO CHARGE DRC TIME CRITERIA NOT MET

## 2025-02-28 ASSESSMENT — PAIN SCALES - GENERAL: PAINLEVEL_OUTOF10: NO PAIN (0)

## 2025-02-28 NOTE — PROGRESS NOTES
Diabetes Self-Management Education & Support    Presents for: Individual review    Type of Service: In Person Visit    Assessment  Ward, 64 year old, returns to [facility] for follow up, here today with his wife. Denies concerns- feels things have been going well.   Since last visit, patient shares his PC removed/discontinued his DX of diabetes and has Prediabetes.   Continues to take Metformin as recommended. Does have some gi sx- but states is manageable at this time.     Fatigue-sometime.   Neuropathy in feet, calves and back of hands once in a while. Same/no change.      A1C:  5.7  1/29/2025    Target A1C: <7.5   Previous A1C: 5.8 August of 2024.      BG Targets:  FBG <130    PP  <180   Meter Report: Contour Nex EZ.    Testing twice daily- purchasing additional test strips.  .     CGM:  does not currently meet CGM criteria.     Metformin  mg one tab twice daily.     Consistent with eating patterns. Proteins. Veg. Mindful of carbs.   Sweets as a treat such as celebrations (birthdays).     Activity- using treadmill every day. Does stairs in home and walking around house- trying to be more active/less sitting.   25 minutes twice daily.     BP  meeting ADA target guideline. Statin use. ASA use. Tobacco use- former use.    Foot exam due- denies concerns.  Eye exam- current- [facility] in [facility].    PCP: Dr Bhatt    PCP follow up-  scheduled.   Insurance Coverage: [employer].    Refills needed: none today       Labs: CMP Microalbumin Lipids within the last year.     Noninvasive Liver Fibrosis Assessment:  defer to pcp for further recommendations.   Computed FIB-4 Calculation unavailable. One or more values for this score either were not found within the given timeframe or did not fit some other criterion.      No Known Allergies     Wt Readings from Last 10 Encounters:   02/28/25 89.8 kg (197 lb 14.4 oz)   01/29/25 84.9 kg (187 lb 4 oz)   08/12/24 85.5 kg (188 lb 9.6 oz)   05/06/24 88.9  kg (196 lb)   04/19/24 87.8 kg (193 lb 9 oz)   04/12/24 87.8 kg (193 lb 9 oz)   02/28/24 94.6 kg (208 lb 8 oz)   10/04/23 94.1 kg (207 lb 6.4 oz)   03/27/23 93 kg (205 lb)   02/28/23 94.3 kg (208 lb)     Patient's most recent   Lab Results   Component Value Date    A1C 5.7 01/29/2025    A1C 6.7 07/25/2022     is meeting goal of  <7.5    Diabetes knowledge and skills assessment:   Patient is knowledgeable in diabetes management concepts related to: Healthy Eating, Being Active, Monitoring, Taking Medication, Problem Solving, Reducing Risks, and Healthy Coping    Based on learning assessment above, most appropriate setting for further diabetes education would be: Individual setting.    Care Plan and Education Provided:  Healthy Eating: Balanced meals and Consistency in amount and timing of carbohydrate intake, Monitoring: Frequency of monitoring, Individual glucose targets, and Log and interpret results, Taking Medication: Side effects of prescribed medication(s), Problem Solving: Low glucose - causes, signs/symptoms, treatment and prevention and When to call a health care provider, Reducing Risks: Eye care, Foot care, and Goal for A1c, how it relates to glucose and how often to check.     Patient verbalized understanding of diabetes self-management education concepts discussed, opportunities for ongoing education and support, and recommendations provided today.    Plan    Follow up:  Will discharge from [facility] at this time.   Pt has been identified by PC as Prediabetes, no longer Diabetes diagnosis.   Pt will continue to follow up with his PC.     Follow-up:  Upcoming Diabetes Ed Appointments     Visit Type Date Time Department    DIABETES ED 2/28/2025  9:00 AM [facility]        See Care Plan for co-developed, patient-state behavior change goals.    Education Materials Provided:  No new materials provided today      Subjective/Objective  Ward is an 64 year old year old, presenting for the following diabetes education  "related to: Presents for: Individual review  Accompanied by: Self, Spouse  Diabetes education in the past 24mo: Yes  Focus of Visit: Diabetes Pathophysiology, Monitoring, Taking Medication  Diabetes type: Type 2  Date of diagnosis: 11/2019.  Disease course: Stable  How confident are you filling out medical forms by yourself:: Quite a bit  Diabetes management related comments/concerns: none today. annual visit.  Transportation concerns: No  Difficulty affording diabetes medication?: No  Difficulty affording diabetes testing supplies?: No  Other concerns:: Other (insurance issue.)  Cultural Influences/Ethnic Background:  Not  or     Diabetes Symptoms & Complications:  Diabetes Related Symptoms: Neuropathy, Fatigue  Weight trend: Stable  Symptom course: Stable  Disease course: Stable  Complications assessed today?: Yes  Autonomic neuropathy: No  CVA: No  Heart disease: No  Nephropathy: No  Peripheral neuropathy: No  Peripheral Vascular Disease: No  Retinopathy: No  Sexual dysfunction: No    Patient Problem List and Family Medical History reviewed for relevant medical history, current medical status, and diabetes risk factors.    Vitals:  /72   Pulse 59   Resp 16   Ht 1.765 m (5' 9.5\")   Wt 89.8 kg (197 lb 14.4 oz)   SpO2 96%   BMI 28.81 kg/m    Estimated body mass index is 28.81 kg/m  as calculated from the following:    Height as of this encounter: 1.765 m (5' 9.5\").    Weight as of this encounter: 89.8 kg (197 lb 14.4 oz).   Last 3 BP:   BP Readings from Last 3 Encounters:   02/28/25 120/72   08/12/24 96/64   04/19/24 114/69       History   Smoking Status    Former    Types: Cigarettes   Smokeless Tobacco    Never       Labs:  Lab Results   Component Value Date    A1C 5.7 01/29/2025    A1C 6.7 07/25/2022     Lab Results   Component Value Date     01/29/2025     11/18/2021     04/03/2020     Lab Results   Component Value Date     01/29/2025    LDL 70 11/18/2019 " "    HDL Cholesterol   Date Value Ref Range Status   11/18/2019 29 (L) 40 - 60 mg/dL Final     Direct Measure HDL   Date Value Ref Range Status   01/29/2025 49 >=40 mg/dL Final   ]  GFR Estimate   Date Value Ref Range Status   01/29/2025 >90 >60 mL/min/1.73m2 Final     Comment:     eGFR calculated using 2021 CKD-EPI equation.   04/03/2020 >90 >60 mL/min/[1.73_m2] Final     Comment:     Non  GFR Calc  Starting 12/18/2018, serum creatinine based estimated GFR (eGFR) will be   calculated using the Chronic Kidney Disease Epidemiology Collaboration   (CKD-EPI) equation.       GFR, ESTIMATED POCT   Date Value Ref Range Status   07/27/2022 >60 >60 mL/min/1.73m2 Final     GFR Estimate If Black   Date Value Ref Range Status   04/03/2020 >90 >60 mL/min/[1.73_m2] Final     Comment:      GFR Calc  Starting 12/18/2018, serum creatinine based estimated GFR (eGFR) will be   calculated using the Chronic Kidney Disease Epidemiology Collaboration   (CKD-EPI) equation.       Lab Results   Component Value Date    CR 0.91 01/29/2025    CR 0.76 04/03/2020     No results found for: \"MICROALBUMIN\"    Healthy Eating:  Healthy Eating Assessed Today: Yes  Cultural/Hoahaoism diet restrictions?: No  Do you have any food allergies or intolerances?: No  Meal planning/habits: Carb counting, Keeps food records  Who cooks/prepares meals for you?: Self  Who purchases food in  your home?: Self  How many times a week on average do you eat food made away from home (restaurant/take-out)?: 0  Meals include: Lunch, Dinner, Afternoon Snack  Breakfast: around 1030-11 am/ noon. lina salad with chicken. or leftovers.  Lunch: varies- eggs. a lot of times lina salad with chicken. oatmeal or cereal.  Dinner: 630-7: hamburger with lettuce and tomato. or eggs. sometimes veg.  low carb.  Snacks: peanuts. 1-2 handfuls.  Other: eats slightly more carb in am, less for dinner.  Beverages: Water, Tea, Coffee  Has patient met with a " dietitian in the past?: No    Being Active:  Being Active Assessed Today: Yes  Exercise:: Yes  Days per week of moderate to strenuous exercise (like a brisk walk): 7  On average, minutes per day of exercise at this level: 50  How intense was your typical exercise? : Moderate (like brisk walking)  Exercise Minutes per Week: 350  Barrier to exercise: Physical limitation    Monitoring:  Monitoring Assessed Today: Yes  Did patient bring glucose meter to appointment? : Yes  Blood Glucose Meter: ContourNext  Times checking blood sugar at home (number): 2  Times checking blood sugar at home (per): Day  Blood glucose trend: No change (stable.)    Taking Medications:  Diabetes Medication(s)       Biguanides       metFORMIN (GLUCOPHAGE XR) 500 MG 24 hr tablet Take 1 tablet (500 mg) by mouth 2 times daily (with meals).          Taking Medication Assessed Today: Yes  Current Treatments: Diet, Oral Medication (taken by mouth)  Problems taking diabetes medications regularly?: No  Diabetes medication side effects?: No    Problem Solving:  Problem Solving Assessed Today: Yes  Is the patient at risk for hypoglycemia?: No  Patient carries a carbohydrate source: Not Needed  Is the patient at risk for DKA?: No  Does patient have severe weather/disaster plan for diabetes management?: No  Does patient have sick day plan for diabetes management?: No  Hypoglycemia: None  Hypoglycemia Complications: None  Reducing Risks:  Reducing Risks Assessed Today: Yes  Diabetes Risks: Age over 45 years, Sedentary Lifestyle  CAD Risks: Diabetes Mellitus, Male sex, Obesity, Sedentary lifestyle  Has dilated eye exam at least once a year?: Yes  Sees dentist every 6 months?: No  Feet checked by healthcare provider in the last year?: No    Healthy Coping:  Healthy Coping Assessed Today: Yes  Emotional response to diabetes: Ready to learn, Confidence diabetes can be controlled, Concern for health and well-being  Informal Support system:: Family, Spouse  Stage  of change: MAINTENANCE (Working to maintain change, with risk of relapse)  Support resources: None    Karissa Akins RN  Time Spent: 15 minutes  Encounter Type: Individual    Any diabetes medication dose changes were made via the CDCES Standing Orders under the patient's referring provider.

## 2025-03-28 ENCOUNTER — TRANSFERRED RECORDS (OUTPATIENT)
Dept: HEALTH INFORMATION MANAGEMENT | Facility: CLINIC | Age: 65
End: 2025-03-28

## 2025-03-28 LAB — RETINOPATHY: NEGATIVE

## 2025-04-01 DIAGNOSIS — R73.03 PREDIABETES: ICD-10-CM

## 2025-04-01 RX ORDER — LANCETS
EACH MISCELLANEOUS
Qty: 100 EACH | Refills: 3 | Status: SHIPPED | OUTPATIENT
Start: 2025-04-01

## 2025-07-30 ENCOUNTER — RESULTS FOLLOW-UP (OUTPATIENT)
Dept: FAMILY MEDICINE | Facility: OTHER | Age: 65
End: 2025-07-30

## 2025-07-30 ENCOUNTER — LAB (OUTPATIENT)
Dept: LAB | Facility: OTHER | Age: 65
End: 2025-07-30
Payer: COMMERCIAL

## 2025-07-30 ENCOUNTER — OFFICE VISIT (OUTPATIENT)
Dept: FAMILY MEDICINE | Facility: OTHER | Age: 65
End: 2025-07-30
Attending: STUDENT IN AN ORGANIZED HEALTH CARE EDUCATION/TRAINING PROGRAM
Payer: COMMERCIAL

## 2025-07-30 VITALS
DIASTOLIC BLOOD PRESSURE: 67 MMHG | BODY MASS INDEX: 28.05 KG/M2 | OXYGEN SATURATION: 98 % | WEIGHT: 192.7 LBS | SYSTOLIC BLOOD PRESSURE: 112 MMHG | TEMPERATURE: 97.2 F | HEART RATE: 55 BPM

## 2025-07-30 DIAGNOSIS — R73.03 PREDIABETES: ICD-10-CM

## 2025-07-30 DIAGNOSIS — K86.89 PANCREATIC MASS: ICD-10-CM

## 2025-07-30 DIAGNOSIS — G47.33 OSA ON CPAP: ICD-10-CM

## 2025-07-30 DIAGNOSIS — E67.3 HYPERVITAMINOSIS D: ICD-10-CM

## 2025-07-30 DIAGNOSIS — R73.03 PREDIABETES: Primary | ICD-10-CM

## 2025-07-30 DIAGNOSIS — E78.2 MIXED HYPERLIPIDEMIA: ICD-10-CM

## 2025-07-30 DIAGNOSIS — R73.03 PRE-DIABETES: Primary | ICD-10-CM

## 2025-07-30 LAB
ALBUMIN SERPL BCG-MCNC: 4.5 G/DL (ref 3.5–5.2)
ALP SERPL-CCNC: 55 U/L (ref 40–150)
ALT SERPL W P-5'-P-CCNC: 27 U/L (ref 0–70)
ANION GAP SERPL CALCULATED.3IONS-SCNC: 10 MMOL/L (ref 7–15)
AST SERPL W P-5'-P-CCNC: 22 U/L (ref 0–45)
BILIRUB SERPL-MCNC: 0.4 MG/DL
BUN SERPL-MCNC: 21.5 MG/DL (ref 8–23)
CALCIUM SERPL-MCNC: 9.1 MG/DL (ref 8.8–10.4)
CHLORIDE SERPL-SCNC: 106 MMOL/L (ref 98–107)
CREAT SERPL-MCNC: 0.89 MG/DL (ref 0.67–1.17)
EGFRCR SERPLBLD CKD-EPI 2021: >90 ML/MIN/1.73M2
EST. AVERAGE GLUCOSE BLD GHB EST-MCNC: 126 MG/DL
GLUCOSE SERPL-MCNC: 118 MG/DL (ref 70–99)
HBA1C MFR BLD: 6 %
HCO3 SERPL-SCNC: 23 MMOL/L (ref 22–29)
POTASSIUM SERPL-SCNC: 4 MMOL/L (ref 3.4–5.3)
PROT SERPL-MCNC: 7 G/DL (ref 6.4–8.3)
SODIUM SERPL-SCNC: 139 MMOL/L (ref 135–145)

## 2025-07-30 PROCEDURE — 82306 VITAMIN D 25 HYDROXY: CPT | Mod: ZL

## 2025-07-30 PROCEDURE — 83036 HEMOGLOBIN GLYCOSYLATED A1C: CPT | Mod: ZL

## 2025-07-30 PROCEDURE — 36415 COLL VENOUS BLD VENIPUNCTURE: CPT | Mod: ZL

## 2025-07-30 PROCEDURE — 3044F HG A1C LEVEL LT 7.0%: CPT

## 2025-07-30 PROCEDURE — 80053 COMPREHEN METABOLIC PANEL: CPT | Mod: ZL

## 2025-07-30 PROCEDURE — G0463 HOSPITAL OUTPT CLINIC VISIT: HCPCS

## 2025-07-30 RX ORDER — METFORMIN HYDROCHLORIDE 500 MG/1
TABLET, EXTENDED RELEASE ORAL
Qty: 90 TABLET | Refills: 2 | Status: SHIPPED | OUTPATIENT
Start: 2025-07-30

## 2025-07-30 ASSESSMENT — PAIN SCALES - GENERAL: PAINLEVEL_OUTOF10: NO PAIN (0)

## 2025-07-30 NOTE — PROGRESS NOTES
"  Assessment & Plan     Prediabetes  A1c is elevated. Will increase Metformin and follow-up in 3 months.   - Hemoglobin A1c; Future    Mixed hyperlipidemia  Stable. Will continue to monitor.   - Comprehensive metabolic panel (BMP + Alb, Alk Phos, ALT, AST, Total. Bili, TP); Future    Hypervitaminosis D  Pending labs.   - Vitamin D Deficiency; Future    JAGUAR on CPAP  Doing well. Refilled supplies.   - CPAP Order for DME - ONLY FOR DME    Pancreatic mass  Recently saw GI and will follow-up in 1 year.     BMI  Estimated body mass index is 28.05 kg/m  as calculated from the following:    Height as of 2/28/25: 1.765 m (5' 9.5\").    Weight as of this encounter: 87.4 kg (192 lb 11.2 oz).           Chandni Hopper is a 64 year old, presenting for the following health issues:  Recheck Medication and Lab Result Notice    Has been feeling well. No symptoms of low blood sugar. Have been ranging 110-130. No chest pain, shortness of breath, abdominal pain, nausea, vomiting or diarrhea. No recent fevers.       7/30/2025     8:46 AM   Additional Questions   Roomed by Jaimee Hill   Accompanied by None         7/30/2025     8:46 AM   Patient Reported Additional Medications   Patient reports taking the following new medications None     History of Present Illness       Reason for visit:  Follow up to physical to check vitamin D   He is taking medications regularly.        Hyperlipidemia Follow-Up    Are you regularly taking any medication or supplement to lower your cholesterol?   Yes- Atrovastin  Are you having muscle aches or other side effects that you think could be caused by your cholesterol lowering medication?  No      How many servings of fruits and vegetables do you eat daily?  2-3  On average, how many sweetened beverages do you drink each day (Examples: soda, juice, sweet tea, etc.  Do NOT count diet or artificially sweetened beverages)?   0  How many days per week do you exercise enough to make your heart beat faster? " 7  How many minutes a day do you exercise enough to make your heart beat faster? 30 - 60  How many days per week do you miss taking your medication? 0  Diabetes Follow-up    How often are you checking your blood sugar? Two times daily  Blood sugar testing frequency justification:  pt stated that he is suppose to check it once daily but he does 2x  What time of day are you checking your blood sugars (select all that apply)?  First thing in am and then 2 hours after dinner  Have you had any blood sugars above 200?  No  Have you had any blood sugars below 70?  No  What symptoms do you notice when your blood sugar is low?  None  What concerns do you have today about your diabetes? None   Do you have any of these symptoms? (Select all that apply)  Numbness in feet  Have you had a diabetic eye exam in the last 12 months? Yes- Date of last eye exam: 3-2025,  [facility]        BP Readings from Last 2 Encounters:   07/30/25 112/67   02/28/25 120/72     Hemoglobin A1C (%)   Date Value   01/29/2025 5.7 (H)   08/12/2024 5.8 (H)   07/25/2022 6.7 (A)   01/17/2020 7.8 (A)     LDL Cholesterol Calculated (mg/dL)   Date Value   01/29/2025 101 (H)   02/28/2024 35   11/18/2019 70               Review of Systems  Constitutional, HEENT, cardiovascular, pulmonary, GI, , musculoskeletal, neuro, skin, endocrine and psych systems are negative, except as otherwise noted.      Objective    /67 (BP Location: Left arm, Patient Position: Sitting, Cuff Size: Adult Regular)   Pulse 55   Temp 97.2  F (36.2  C) (Tympanic)   Wt 87.4 kg (192 lb 11.2 oz)   SpO2 98%   BMI 28.05 kg/m    Body mass index is 28.05 kg/m .  Physical Exam   GENERAL: alert and no distress  EYES: Eyes grossly normal to inspection  RESP: lungs clear to auscultation - no rales, rhonchi or wheezes  CV: regular rate and rhythm, normal S1 S2, no S3 or S4, no murmur, click or rub, no peripheral edema  ABDOMEN: soft, nontender,  no masses and bowel sounds  normal  MS: no gross musculoskeletal defects noted, no edema  PSYCH: mentation appears normal, affect normal/bright    Recent Results (from the past 24 hours)   Hemoglobin A1c   Result Value Ref Range    Estimated Average Glucose 126 (H) <117 mg/dL    Hemoglobin A1C 6.0 (H) <5.7 %   Comprehensive metabolic panel (BMP + Alb, Alk Phos, ALT, AST, Total. Bili, TP)   Result Value Ref Range    Sodium 139 135 - 145 mmol/L    Potassium 4.0 3.4 - 5.3 mmol/L    Carbon Dioxide (CO2) 23 22 - 29 mmol/L    Anion Gap 10 7 - 15 mmol/L    Urea Nitrogen 21.5 8.0 - 23.0 mg/dL    Creatinine 0.89 0.67 - 1.17 mg/dL    GFR Estimate >90 >60 mL/min/1.73m2    Calcium 9.1 8.8 - 10.4 mg/dL    Chloride 106 98 - 107 mmol/L    Glucose 118 (H) 70 - 99 mg/dL    Alkaline Phosphatase 55 40 - 150 U/L    AST 22 0 - 45 U/L    ALT 27 0 - 70 U/L    Protein Total 7.0 6.4 - 8.3 g/dL    Albumin 4.5 3.5 - 5.2 g/dL    Bilirubin Total 0.4 <=1.2 mg/dL             Signed Electronically by: Scarlett Cardozo PA-C

## 2025-07-31 LAB — VIT D+METAB SERPL-MCNC: 51 NG/ML (ref 20–50)
